# Patient Record
Sex: MALE | Race: ASIAN | NOT HISPANIC OR LATINO | Employment: OTHER | URBAN - METROPOLITAN AREA
[De-identification: names, ages, dates, MRNs, and addresses within clinical notes are randomized per-mention and may not be internally consistent; named-entity substitution may affect disease eponyms.]

---

## 2019-01-02 ENCOUNTER — HOSPITAL ENCOUNTER (OUTPATIENT)
Dept: RADIOLOGY | Facility: HOSPITAL | Age: 79
Discharge: HOME | End: 2019-01-02
Attending: INTERNAL MEDICINE
Payer: MEDICARE

## 2019-01-02 DIAGNOSIS — M54.50 LOW BACK PAIN: ICD-10-CM

## 2019-01-02 DIAGNOSIS — M25.561 RIGHT KNEE PAIN: Primary | ICD-10-CM

## 2019-01-02 DIAGNOSIS — R05.9 COUGH: ICD-10-CM

## 2019-01-02 DIAGNOSIS — M25.561 RIGHT KNEE PAIN: ICD-10-CM

## 2019-01-02 DIAGNOSIS — R60.0 LOWER LEG EDEMA: Primary | ICD-10-CM

## 2019-01-02 DIAGNOSIS — R60.0 LEG EDEMA: Primary | ICD-10-CM

## 2019-01-02 DIAGNOSIS — M25.562 LEFT KNEE PAIN: ICD-10-CM

## 2019-01-02 DIAGNOSIS — R09.89 BRUIT: Primary | ICD-10-CM

## 2019-01-02 DIAGNOSIS — R05.9 COUGH: Primary | ICD-10-CM

## 2019-01-02 DIAGNOSIS — M81.0 OSTEOPOROSIS, SENILE: Primary | ICD-10-CM

## 2019-01-02 PROCEDURE — 71046 X-RAY EXAM CHEST 2 VIEWS: CPT | Mod: TC,FY

## 2019-01-02 PROCEDURE — 73560 XR KNEE 1 OR 2 VIEW RIGHT: ICD-10-PCS | Mod: 26,RT,, | Performed by: RADIOLOGY

## 2019-01-02 PROCEDURE — 73560 X-RAY EXAM OF KNEE 1 OR 2: CPT | Mod: TC,FY,RT

## 2019-01-02 PROCEDURE — 73560 XR KNEE 1 OR 2 VIEW LEFT: ICD-10-PCS | Mod: 26,LT,, | Performed by: RADIOLOGY

## 2019-01-02 PROCEDURE — 73560 X-RAY EXAM OF KNEE 1 OR 2: CPT | Mod: TC,FY,LT

## 2019-01-02 PROCEDURE — 73560 X-RAY EXAM OF KNEE 1 OR 2: CPT | Mod: 26,RT,, | Performed by: RADIOLOGY

## 2019-01-02 PROCEDURE — 71046 XR CHEST PA AND LATERAL: ICD-10-PCS | Mod: 26,,, | Performed by: RADIOLOGY

## 2019-01-02 PROCEDURE — 73560 X-RAY EXAM OF KNEE 1 OR 2: CPT | Mod: 26,LT,, | Performed by: RADIOLOGY

## 2019-01-02 PROCEDURE — 71046 X-RAY EXAM CHEST 2 VIEWS: CPT | Mod: 26,,, | Performed by: RADIOLOGY

## 2019-01-10 ENCOUNTER — HOSPITAL ENCOUNTER (OUTPATIENT)
Dept: RADIOLOGY | Facility: HOSPITAL | Age: 79
Discharge: HOME | End: 2019-01-10
Attending: ORTHOPAEDIC SURGERY
Payer: MEDICARE

## 2019-01-10 ENCOUNTER — CLINICAL SUPPORT (OUTPATIENT)
Dept: LAB | Facility: HOSPITAL | Age: 79
End: 2019-01-10
Attending: ORTHOPAEDIC SURGERY
Payer: MEDICARE

## 2019-01-10 DIAGNOSIS — Z01.818 PREOP EXAMINATION: ICD-10-CM

## 2019-01-10 DIAGNOSIS — Z01.818 PREOP EXAMINATION: Primary | ICD-10-CM

## 2019-01-10 PROCEDURE — 93010 EKG 12-LEAD: ICD-10-PCS | Mod: ,,, | Performed by: INTERNAL MEDICINE

## 2019-01-10 PROCEDURE — 71046 XR CHEST PA AND LATERAL: ICD-10-PCS | Mod: 26,,, | Performed by: RADIOLOGY

## 2019-01-10 PROCEDURE — 71046 X-RAY EXAM CHEST 2 VIEWS: CPT | Mod: 26,,, | Performed by: RADIOLOGY

## 2019-01-10 PROCEDURE — 93005 ELECTROCARDIOGRAM TRACING: CPT

## 2019-01-10 PROCEDURE — 93010 ELECTROCARDIOGRAM REPORT: CPT | Mod: ,,, | Performed by: INTERNAL MEDICINE

## 2019-01-10 PROCEDURE — 71046 X-RAY EXAM CHEST 2 VIEWS: CPT | Mod: TC,FY

## 2019-01-18 DIAGNOSIS — M17.11 PRIMARY OSTEOARTHRITIS OF RIGHT KNEE: Primary | ICD-10-CM

## 2019-01-22 DIAGNOSIS — Z01.818 PREOPERATIVE TESTING: Primary | ICD-10-CM

## 2019-01-23 DIAGNOSIS — R06.02 SHORTNESS OF BREATH: Primary | ICD-10-CM

## 2019-01-24 ENCOUNTER — ANESTHESIA EVENT (OUTPATIENT)
Dept: SURGERY | Facility: HOSPITAL | Age: 79
DRG: 470 | End: 2019-01-24
Payer: MEDICARE

## 2019-01-24 ENCOUNTER — HOSPITAL ENCOUNTER (OUTPATIENT)
Facility: HOSPITAL | Age: 79
Discharge: HOME | End: 2019-01-24
Attending: PAIN MEDICINE | Admitting: PAIN MEDICINE
Payer: MEDICARE

## 2019-01-24 ENCOUNTER — HOSPITAL ENCOUNTER (OUTPATIENT)
Dept: CARDIOLOGY | Facility: HOSPITAL | Age: 79
Discharge: HOME | End: 2019-01-24
Attending: INTERNAL MEDICINE
Payer: MEDICARE

## 2019-01-24 VITALS
DIASTOLIC BLOOD PRESSURE: 93 MMHG | RESPIRATION RATE: 18 BRPM | OXYGEN SATURATION: 98 % | HEIGHT: 68 IN | SYSTOLIC BLOOD PRESSURE: 195 MMHG | HEART RATE: 86 BPM | WEIGHT: 172.38 LBS | TEMPERATURE: 98 F | BODY MASS INDEX: 26.13 KG/M2

## 2019-01-24 DIAGNOSIS — R06.02 SHORTNESS OF BREATH: ICD-10-CM

## 2019-01-24 DIAGNOSIS — M17.11 PRIMARY OSTEOARTHRITIS OF RIGHT KNEE: Primary | ICD-10-CM

## 2019-01-24 LAB
AORTIC ROOT ANNULUS: 2.49 CM
AV INDEX (PROSTH): 0.76
AV MEAN GRADIENT: 2.41 MMHG
AV PEAK GRADIENT: 4.75 MMHG
AV VALVE AREA: 3.39 CM2
AV VELOCITY RATIO: 0.79
CV ECHO LV RWT: 0.36 CM
DOP CALC AO PEAK VEL: 1.09 M/S
DOP CALC AO VTI: 24.22 CM
DOP CALC LVOT AREA: 4.48 CM2
DOP CALC LVOT DIAMETER: 2.39 CM
DOP CALC LVOT PEAK VEL: 0.86 M/S
DOP CALC LVOT STROKE VOLUME: 82.15 CM3
DOP CALCLVOT PEAK VEL VTI: 18.32 CM
E WAVE DECELERATION TIME: 422.08 MSEC
E/A RATIO: 0.48
ECHO LV POSTERIOR WALL: 0.74 CM (ref 0.6–1.1)
FRACTIONAL SHORTENING: 32 % (ref 28–44)
INTERVENTRICULAR SEPTUM: 1 CM (ref 0.6–1.1)
LA MAJOR: 4.43 CM
LA MINOR: 4.28 CM
LA WIDTH: 2.76 CM
LEFT ATRIUM SIZE: 3.49 CM
LEFT ATRIUM VOLUME: 35.65 CM3
LEFT INTERNAL DIMENSION IN SYSTOLE: 2.78 CM (ref 2.1–4)
LEFT VENTRICLE DIASTOLIC VOLUME: 72.42 ML
LEFT VENTRICLE SYSTOLIC VOLUME: 28.95 ML
LEFT VENTRICULAR INTERNAL DIMENSION IN DIASTOLE: 4.06 CM (ref 3.5–6)
LEFT VENTRICULAR MASS: 107.25 G
MV PEAK A VEL: 0.81 M/S
MV PEAK E VEL: 0.39 M/S
PISA TR MAX VEL: 2.23 M/S
PULM VEIN S/D RATIO: 2.12
PV PEAK D VEL: 0.25 M/S
PV PEAK S VEL: 0.53 M/S
PV PEAK VELOCITY: 0.71 CM/S
RA MAJOR: 4.31 CM
RA PRESSURE: 3 MMHG
RIGHT VENTRICULAR END-DIASTOLIC DIMENSION: 2.45 CM
STJ: 3.02 CM
TR MAX PG: 19.89 MMHG
TV REST PULMONARY ARTERY PRESSURE: 23 MMHG

## 2019-01-24 PROCEDURE — 27201689 HC IOVERA SMART TIP/CARTRIDGE: Performed by: NURSE PRACTITIONER

## 2019-01-24 PROCEDURE — 64640 INJECTION TREATMENT OF NERVE: CPT | Mod: RT,,, | Performed by: NURSE PRACTITIONER

## 2019-01-24 PROCEDURE — 64640 INJECTION TREATMENT OF NERVE: CPT | Performed by: NURSE PRACTITIONER

## 2019-01-24 PROCEDURE — 25000003 PHARM REV CODE 250: Performed by: PAIN MEDICINE

## 2019-01-24 PROCEDURE — 64640 PR DESTRUCT BY NEURO AGENT; OTHER PERIPH NERVE: ICD-10-PCS | Mod: RT,,, | Performed by: NURSE PRACTITIONER

## 2019-01-24 PROCEDURE — 93306 TTE W/DOPPLER COMPLETE: CPT

## 2019-01-24 RX ORDER — METFORMIN HYDROCHLORIDE 500 MG/1
500 TABLET ORAL 2 TIMES DAILY WITH MEALS
COMMUNITY
End: 2023-05-04 | Stop reason: CLARIF

## 2019-01-24 RX ORDER — LIDOCAINE HYDROCHLORIDE 10 MG/ML
1 INJECTION, SOLUTION EPIDURAL; INFILTRATION; INTRACAUDAL; PERINEURAL ONCE
Status: CANCELLED | OUTPATIENT
Start: 2019-01-24 | End: 2019-01-24

## 2019-01-24 RX ORDER — LIDOCAINE HYDROCHLORIDE 10 MG/ML
10 INJECTION INFILTRATION; PERINEURAL ONCE
Status: COMPLETED | OUTPATIENT
Start: 2019-01-24 | End: 2019-01-24

## 2019-01-24 RX ORDER — LEVOTHYROXINE SODIUM 25 UG/1
25 TABLET ORAL DAILY
COMMUNITY
End: 2023-05-04

## 2019-01-24 RX ORDER — SODIUM CHLORIDE, SODIUM LACTATE, POTASSIUM CHLORIDE, CALCIUM CHLORIDE 600; 310; 30; 20 MG/100ML; MG/100ML; MG/100ML; MG/100ML
INJECTION, SOLUTION INTRAVENOUS CONTINUOUS
Status: CANCELLED | OUTPATIENT
Start: 2019-01-24

## 2019-01-24 RX ADMIN — LIDOCAINE HYDROCHLORIDE 10 ML: 10 INJECTION, SOLUTION INFILTRATION; PERINEURAL at 09:01

## 2019-01-24 NOTE — ANESTHESIA PREPROCEDURE EVALUATION
01/24/2019  Sly Villarreal is a 79 y.o., male right sigh assisted knee arthroplasty under spinal/MAC/gen on 1/28/19.  Macedonian speaking patient,  present.    Family medicine optimization in Epic.     Anesthesia Evaluation    I have reviewed the Patient Summary Reports.    I have reviewed the Nursing Notes.   I have reviewed the Medications.     Review of Systems  Anesthesia Hx:  No previous Anesthesia  Denies Family Hx of Anesthesia complications.    Social:  Non-Smoker, No Alcohol Use    Hematology/Oncology:  Hematology Normal   Oncology Normal     Cardiovascular:  Cardiovascular Normal   Denies Angina.     ECG has been reviewed.    Pulmonary:  Pulmonary Normal  Denies Shortness of breath.    Renal/:  Renal/ Normal     Hepatic/GI:  Hepatic/GI Normal Denies Liver Disease.    Musculoskeletal:   Arthritis     Neurological:  Neurology Normal Denies Seizures.    Endocrine:   Diabetes, type 2        Physical Exam  General:  Well nourished    Airway/Jaw/Neck:  Airway Findings: Mouth Opening: Normal Tongue: Normal  General Airway Assessment: Adult  Mallampati: II      Dental:  Dental Findings: Periodontal disease, Severe   Chest/Lungs:  Chest/Lungs Findings: Clear to auscultation, Normal Respiratory Rate     Heart/Vascular:  Heart Findings: Rate: Normal  Rhythm: Regular Rhythm  Sounds: Normal  Heart murmur: negative       Mental Status:  Mental Status Findings:  Cooperative, Alert and Oriented       EKG 1/10/19:  Normal sinus rhythm  Possible Left atrial enlargement  abnormal R wave progression in precordial leads  Low T waves  Borderline Abnormal ECG  No previous ECGs available  Confirmed by Efrain Verma MD    Echo 1/24/19:  · Normal left ventricular systolic function. The estimated ejection fraction is 60%  · Grade I (mild) left ventricular diastolic dysfunction consistent with impaired  relaxation.  · Normal right ventricular systolic function.  · Mild aortic regurgitation.  · Mild mitral regurgitation.  · The aortic valve is bileaflet.  · Mild tricuspid regurgitation.  · Normal left atrial pressure.  · Normal central venous pressure (3 mm Hg).  · The estimated PA systolic pressure is 23 mm Hg         Anesthesia Plan  Type of Anesthesia, risks & benefits discussed:  Anesthesia Type:  general, MAC, spinal, regional  Patient's Preference:   Intra-op Monitoring Plan: standard ASA monitors  Intra-op Monitoring Plan Comments:   Post Op Pain Control Plan: multimodal analgesia  Post Op Pain Control Plan Comments:   Induction:   IV  Beta Blocker:  Patient is not currently on a Beta-Blocker (No further documentation required).       Informed Consent: Patient understands risks and agrees with Anesthesia plan.  Questions answered. Anesthesia consent signed with patient.  ASA Score: 2     Day of Surgery Review of History & Physical:  There are no significant changes.      Anesthesia Plan Notes: Anesthesia consent to be signed prior to procedure on 1/28/19  Family medicine optimization in Epic.         Ready For Surgery From Anesthesia Perspective.

## 2019-01-24 NOTE — PLAN OF CARE
Allergies, medical, surgical, family and psychosocial histories reviewed with patient. Periop plan of care discussed. Preop instructions given, including medications to take and to hold. Hibiclens soap and instructions on use given. Time given for questions and pt voiced understanding.      Pt contact number:   [phone] Daughter (Kimberly)

## 2019-01-24 NOTE — PLAN OF CARE
· Arrive on 1/28/2019 at  8:00 am.  · Report to the 2nd floor Procedural Check In Room .   · Nothing to eat or drink after midnight the night before your procedure.  · Take the levothyroxine medications the morning of surgery with a small sip of water.                                                         · Please remove all body piercings and leave all your jewelery and valuables at home .  · Please remove your contact lenses.   · Wear loose comfortable clothing.  · You will not be able to drive that day, please make arrangement for transportation to and from your procedure.  · You cannot be alone for 24 hours after anesthesia. Make arrangements as needed.  · Shower the night before your procedure and the morning of your procedure with Hibiclens antibacterial solution.  · No lotions, creams or powders  · Do not shave 3 days prior to procedure  · Report any signs or symptoms of an infection to your surgeon. Examples: urinary (bladder) infection, upper respiratory infection, skin boils.   · Practice good hand washing before, during, and after procedure.   · Stop Aspirin, Ibuprofen, Advil, Motrin, Aleve, Nuprin, Naprosyn (all NSAID Medication) or any other blood thinners 5 days before your procedure unless directed by your physician.  Also hold all over the counter vitamins and herbal supplements for 5 days prior to your procedure.  · You may take Tylenol or Acetaminophen up the day of surgery for any pain.        I have read or had read and explained to me, and understand the above information.    Additional comments or instructions:  For additional questions call [phone]        Pre-Op Bathing Instructions    Before surgery, you can play an important role in your own health.    Because skin is not sterile, we need to be sure that your skin is as free of germs as possible. By following the instructions below, you can reduce the number of germs on your skin before surgery.    IMPORTANT: You will need to shower with  a special soap called Hibiclens*, available at any pharmacy, over the counter usually in the first aid isle.  If you are allergic to Chlorhexidine (the antiseptic in Hibiclens), use an antibacterial soap such as Dial Soap for your preoperative showers.  You will shower with Hibiclens the night before and the morning of surgery. Both the night before your surgery and the morning of your surgery see steps 2 and 3.   Do not use Hibiclens on the head, face or genitals to avoid injury to those areas.    STEP #1  1.  Shower with Hibiclens solution night before and the morning of surgery.      STEP #2: THE NIGHT BEFORE YOUR SURGERY     1. Do not shave the area of your body where your surgery will be performed.  2. Wash your hair as usual with your normal  Shampoo. Wash body shoulder to toes with your normal soap.  3. Squeeze Hibiclens into hand apply to surgical site.   4. Wash the site gently for five (5) minutes. Do not scrub your skin too hard.   5. Do not wash with your regular soap after Hibiclens is used.  6. Rinse your body thoroughly.  7. Pat yourself dry with a clean, soft towel.  8. Do not use lotion, cream, or powder.  9. Wear clean clothes.    STEP #3: THE MORNING OF YOUR SURGERY     1. Repeat Step #2.    * Not to be used by people allergic to Chlorhexidine.            Total Knee Replacement  During total knee replacement surgery, your damaged knee joint is replaced with an artificial joint, called a prosthesis. This surgery almost always reduces joint pain and improves your quality of life.     The parts of the prosthesis are secured to the bones of the knee. Together they form the new joint.   Before your surgery  You will most likely arrive at the hospital on the morning of the surgery. Be sure to follow all of your doctors instructions on preparing for surgery:  · Follow any directions you are given for taking medicines or for not eating or drinking before surgery.  · At the hospital, your temperature,  pulse, breathing, and blood pressure will be checked.  · An IV (intravenous) line will be started to provide fluids and medicines needed during surgery.  The surgical procedure  When the surgical team is ready, youll be taken to the operating room. There youll be given anesthesia to help you sleep through surgery, or to make you numb from the waist down. Then an incision is made on the front or side of your knee. Any damaged bone is cleaned away, and the new joint components are put into place. The incision is closed with surgical staples or stitches.  After your surgery  After surgery, youtatyana be sent to the recovery room. When you are fully awake, youll be moved to your room. The nurses will give you medicines to ease your pain. You may have a catheter (small tube) in your bladder. A continuous passive motion machine may be used on your knee to keep it from getting stiff. A sequential compression machine may be used to prevent blood clots by gently squeezing then releasing your leg. You may be given medicine to prevent blood clots. Soon, healthcare providers will help you get up and moving.  When to call your doctor  Once at home, call your doctor if you have any of the symptoms below:  · An increase in knee pain  · Pain or swelling in the calf or leg  · Unusual redness, heat, or drainage at the incision site  · Fever of 100.4°F (38°C) or higher  · Shaking chills  · Trouble breathing or chest pain (call 911)   Date Last Reviewed: 9/20/2015 © 2000-2017 [employer]. [address] [place] [address], [place] [address]. All rights reserved. This information is not intended as a substitute for professional medical care. Always follow your healthcare professional's instructions.          Anesthesia: Regional Anesthesia    Youre scheduled for surgery. During surgery, youll receive medicine called anesthesia to keep you comfortable and pain-free. Your surgeon has decided that youll receive regional anesthesia.  This sheet tells you what to expect with this type of anesthesia.  What is regional anesthesia?  Regional anesthesia numbs one region of your body. The anesthesia may be given around nerves or into veins in your arms, neck, or legs (nerve block or [place] block). Or it may be sent into the spinal fluid (spinal anesthesia) or into the space just outside the spinal fluid (epidural anesthesia). You may also be given sedatives to help you relax.  Nerve block or [place] block  A small area of the body, such as an arm or leg, can be numbed using a nerve block or [place] block.  · Nerve block. During a nerve block, your skin is numbed. A needle is then inserted near nerves that serve the area to be numbed. Anesthetic is sent through the needle.  · IV regional or [place] block. For this type of block, an IV line is put into a vein. The blood flow to the area to be numbed is blocked for a short time. Anesthetic is sent through the IV.  Spinal anesthesia  Spinal anesthesia numbs your body from about the waist down.  · Anesthetic is injected into the spinal fluid. This is a substance that surrounds the spinal cord in your spinal column. The anesthetic blocks pain traveling from the body to the brain.  · To receive the anesthetic, your skin is numbed at the injection site on your back.  · A needle is then inserted into the spinal space. Anesthetic is sent into the spinal fluid through the needle.  Epidural anesthesia  Epidural anesthesia is most commonly used during childbirth and may also be used after surgical procedures of the chest, belly, and legs.  · Anesthetic is injected into the epidural space. This is just outside the dural sac which contains the spinal fluid.  · To receive the anesthetic, your skin is numbed at the injection site on your back.  · A needle is then inserted into the epidural space. Anesthetic is sent into the epidural space through the needle.  · A small flexible catheter may be attached to the needle and left in  place. This allows for continuous injections or infusions of anesthetic.  Anesthesia tools and medicines that might be near you during your procedure  · Local anesthetic. This medicine is given through a needle numbs one region of your body.  · Electrocardiography leads (electrodes). These are used to record your heart rate and rhythm.  · Blood pressure cuff. A cuff is placed on your arm to keep track of your blood pressure.  · Pulse oximeter. This small clip is placed on the end of the finger. It measures your blood oxygen level.  · Sedatives. These medicines may be given through an IV. They help to relax you and keep you comfortable. You may stay awake or sleep lightly.  · Oxygen. You may be given oxygen through a facemask.  Risks and possible complications  Regional anesthesia carries some risks. These include:  · Nausea and vomiting  · Headache  · Backache  · Decreased blood pressure  · Allergic reaction to the anesthetic  · Ongoing numbness (rare)  · Irregular heartbeat (rare)  · Cardiac arrest (rare)   Date Last Reviewed: 12/1/2016  © 3247-9385 [employer]. [address] [place] [address]. All rights reserved. This information is not intended as a substitute for professional medical care. Always follow your healthcare professional's instructions.

## 2019-01-24 NOTE — PLAN OF CARE
Received pt for IOVERA treatment. See flow sheets.     NIURKA Rucker FNP at bedside. Consent obtained.     0906 time out completed.  0908 Procedure started.  0935 Procedure complete.    Discharge teaching done with pt and daughter, time allowed for questions. Verbalized understanding. Pt discharged ambulatory to Pre-op.          IOVERA  Smart Tip [id number]  Cartridge Lot #[id number]

## 2019-01-24 NOTE — BRIEF OP NOTE
Procedure Note Iovera:    DATE OF PROCEDURE:  1/24/2019    PREOPERATIVE DIAGNOSIS: right knee osteoarthritis.     POSTOPERATIVE DIAGNOSIS: right knee osteoarthritis.     PROCEDURE: Iovera treatment of anterior femoral cutaneous nerve, and both branches of infrapatellar saphenous nerve using at least 3 different punctures to treat all 3 nerves. (cpt 64640 x3)    ATTENDING SURGEON: EDWARD Mancuso  Interventional Pain Management      ASSISTANT: none    COMPLICATIONS: None.     IMPLANTS:  None    ESTIMATED BLOOD LOSS:  < 5cc    SPECIMENS REMOVED:  None    ANESTHESIA: Local lidocaine    INDICATIONS FOR PROCEDURE: This is a 79 y.o. male with longstanding knee pain. They have failed non operative management including injections.I discussed a new treatment therapy called Iovera, which is cryotherapy, to provide symptomatic relief along the sensory distribution of the infrapatellar tendon branch of the saphenous nerve  and AFCN. The patient elected to move forward with this  We did discuss the fact that this is a fairly novel procedure and there is very limited scientific data around this.  However, it FDA approved.  The patient was given patient information and literature to review prior to the procedure as well.  Based on this, the patient agreed to move forward with doing the procedure.      PROCEDURE:    The patient was placed supine on the exam table and the proximal medial aspect of the right tibia and anterior aspect of distal femur was prepped with sterile Betadine and alcohol.  A line was drawn extending approximately 5 cm medial to inferior pole of the patella distally to a point approximately 5 cm medial to the tibial tubercle.  A second line was drawn in a medial to lateral direction the width of the patella approximately 7 cm proximal to the patella. We then infiltrated the skin with lidocaine along both lines using a 25g needle. We then introduced the Iovera device along these lines and this device penetrated  the skin, creating cryotherapy to both branches of the infrapatellar saphenous nerve and a third treatment to the anterior femoral cutaneous nerve. 7 punctures of the skin were made to treat the 2 branches of the ISN and another 7 punctures were made to treat the AFCN. There were a total of 3 nerves treated with iovera. The patient tolerated the procedure well with no problems.

## 2019-01-24 NOTE — DISCHARGE SUMMARY
[facility]  Discharge Note  Short Stay    Admit Date: 1/24/2019    Discharge Date and Time: No discharge date for patient encounter.     Attending Physician: Otilia Garcia Jr., MD     Discharge Provider: EDWARD Mancuso  Interventional Pain Management      Diagnoses:  Active Hospital Problems    Diagnosis  POA    Primary osteoarthritis of right knee [M17.11]  Yes      Resolved Hospital Problems   No resolved problems to display.       Discharged Condition: good    Hospital Course: Patient was admitted for an outpatient procedure and tolerated the procedure well with no complications.    Final Diagnoses: Same as principal problem.    Disposition: Home or Self Care    Follow up/Patient Instructions:    Medications:  Reconciled Home Medications:      Medication List      ASK your doctor about these medications    HYDROcodone-acetaminophen 5-325 mg per tablet  Commonly known as:  NORCO  Take 1 tablet by mouth every 4 (four) hours as needed for Pain.     meloxicam 15 MG tablet  Commonly known as:  MOBIC  TAKE 1 TABLET BY MOUTH DAILY WITH FOOD  FOR PAIN     tamsulosin 0.4 mg Cap  Commonly known as:  FLOMAX  TAKE 1 CAPSULE BY MOUTH DAILY FOR PROSTATE     VITAMIN D2 50,000 unit Cap  Generic drug:  ergocalciferol  Take 1 capsule (50,000 Units total) by mouth every 7 days.              Discharge Procedure Orders (must include Diet, Follow-up, Activity):  Discharge Diagnosis: Same as Pre and Post Procedure  Condition on Discharge: Stable.  Diet on Discharge: Same as before.  Activity: as per instruction sheet.  Discharge to: Home with a responsible adult.  Follow up: as per Discharge instructions

## 2019-01-24 NOTE — DISCHARGE INSTRUCTIONS
· Arrive on 1/28/2019 at  8:00 am.  · Report to the 2nd floor Procedural Check In Room .   · Nothing to eat or drink after midnight the night before your procedure.  · Take the levothyroxine medications the morning of surgery with a small sip of water.                                                         · Please remove all body piercings and leave all your jewelery and valuables at home .  · Please remove your contact lenses.   · Wear loose comfortable clothing.  · You will not be able to drive that day, please make arrangement for transportation to and from your procedure.  · You cannot be alone for 24 hours after anesthesia. Make arrangements as needed.  · Shower the night before your procedure and the morning of your procedure with Hibiclens antibacterial solution.  · No lotions, creams or powders  · Do not shave 3 days prior to procedure  · Report any signs or symptoms of an infection to your surgeon. Examples: urinary (bladder) infection, upper respiratory infection, skin boils.   · Practice good hand washing before, during, and after procedure.   · Stop Aspirin, Ibuprofen, Advil, Motrin, Aleve, Nuprin, Naprosyn (all NSAID Medication) or any other blood thinners 5 days before your procedure unless directed by your physician.  Also hold all over the counter vitamins and herbal supplements for 5 days prior to your procedure.  · You may take Tylenol or Acetaminophen up the day of surgery for any pain.        I have read or had read and explained to me, and understand the above information.    Additional comments or instructions:  For additional questions call [phone]        Pre-Op Bathing Instructions    Before surgery, you can play an important role in your own health.    Because skin is not sterile, we need to be sure that your skin is as free of germs as possible. By following the instructions below, you can reduce the number of germs on your skin before surgery.    IMPORTANT: You will need to shower with  a special soap called Hibiclens*, available at any pharmacy, over the counter usually in the first aid isle.  If you are allergic to Chlorhexidine (the antiseptic in Hibiclens), use an antibacterial soap such as Dial Soap for your preoperative showers.  You will shower with Hibiclens the night before and the morning of surgery. Both the night before your surgery and the morning of your surgery see steps 2 and 3.   Do not use Hibiclens on the head, face or genitals to avoid injury to those areas.    STEP #1  1.  Shower with Hibiclens solution night before and the morning of surgery.      STEP #2: THE NIGHT BEFORE YOUR SURGERY     1. Do not shave the area of your body where your surgery will be performed.  2. Wash your hair as usual with your normal  Shampoo. Wash body shoulder to toes with your normal soap.  3. Squeeze Hibiclens into hand apply to surgical site.   4. Wash the site gently for five (5) minutes. Do not scrub your skin too hard.   5. Do not wash with your regular soap after Hibiclens is used.  6. Rinse your body thoroughly.  7. Pat yourself dry with a clean, soft towel.  8. Do not use lotion, cream, or powder.  9. Wear clean clothes.    STEP #3: THE MORNING OF YOUR SURGERY     1. Repeat Step #2.    * Not to be used by people allergic to Chlorhexidine.            Total Knee Replacement  During total knee replacement surgery, your damaged knee joint is replaced with an artificial joint, called a prosthesis. This surgery almost always reduces joint pain and improves your quality of life.     The parts of the prosthesis are secured to the bones of the knee. Together they form the new joint.   Before your surgery  You will most likely arrive at the hospital on the morning of the surgery. Be sure to follow all of your doctors instructions on preparing for surgery:  · Follow any directions you are given for taking medicines or for not eating or drinking before surgery.  · At the hospital, your temperature,  pulse, breathing, and blood pressure will be checked.  · An IV (intravenous) line will be started to provide fluids and medicines needed during surgery.  The surgical procedure  When the surgical team is ready, youll be taken to the operating room. There youll be given anesthesia to help you sleep through surgery, or to make you numb from the waist down. Then an incision is made on the front or side of your knee. Any damaged bone is cleaned away, and the new joint components are put into place. The incision is closed with surgical staples or stitches.  After your surgery  After surgery, youtatyana be sent to the recovery room. When you are fully awake, youll be moved to your room. The nurses will give you medicines to ease your pain. You may have a catheter (small tube) in your bladder. A continuous passive motion machine may be used on your knee to keep it from getting stiff. A sequential compression machine may be used to prevent blood clots by gently squeezing then releasing your leg. You may be given medicine to prevent blood clots. Soon, healthcare providers will help you get up and moving.  When to call your doctor  Once at home, call your doctor if you have any of the symptoms below:  · An increase in knee pain  · Pain or swelling in the calf or leg  · Unusual redness, heat, or drainage at the incision site  · Fever of 100.4°F (38°C) or higher  · Shaking chills  · Trouble breathing or chest pain (call 911)   Date Last Reviewed: 9/20/2015 © 2000-2017 [email or web address]. [address] [place] [address], [place] [address]. All rights reserved. This information is not intended as a substitute for professional medical care. Always follow your healthcare professional's instructions.          Anesthesia: Regional Anesthesia    Youre scheduled for surgery. During surgery, youll receive medicine called anesthesia to keep you comfortable and pain-free. Your surgeon has decided that youll receive regional anesthesia.  This sheet tells you what to expect with this type of anesthesia.  What is regional anesthesia?  Regional anesthesia numbs one region of your body. The anesthesia may be given around nerves or into veins in your arms, neck, or legs (nerve block or [place] block). Or it may be sent into the spinal fluid (spinal anesthesia) or into the space just outside the spinal fluid (epidural anesthesia). You may also be given sedatives to help you relax.  Nerve block or [place] block  A small area of the body, such as an arm or leg, can be numbed using a nerve block or Emily block.  · Nerve block. During a nerve block, your skin is numbed. A needle is then inserted near nerves that serve the area to be numbed. Anesthetic is sent through the needle.  · IV regional or [place] block. For this type of block, an IV line is put into a vein. The blood flow to the area to be numbed is blocked for a short time. Anesthetic is sent through the IV.  Spinal anesthesia  Spinal anesthesia numbs your body from about the waist down.  · Anesthetic is injected into the spinal fluid. This is a substance that surrounds the spinal cord in your spinal column. The anesthetic blocks pain traveling from the body to the brain.  · To receive the anesthetic, your skin is numbed at the injection site on your back.  · A needle is then inserted into the spinal space. Anesthetic is sent into the spinal fluid through the needle.  Epidural anesthesia  Epidural anesthesia is most commonly used during childbirth and may also be used after surgical procedures of the chest, belly, and legs.  · Anesthetic is injected into the epidural space. This is just outside the dural sac which contains the spinal fluid.  · To receive the anesthetic, your skin is numbed at the injection site on your back.  · A needle is then inserted into the epidural space. Anesthetic is sent into the epidural space through the needle.  · A small flexible catheter may be attached to the needle and left in  place. This allows for continuous injections or infusions of anesthetic.  Anesthesia tools and medicines that might be near you during your procedure  · Local anesthetic. This medicine is given through a needle numbs one region of your body.  · Electrocardiography leads (electrodes). These are used to record your heart rate and rhythm.  · Blood pressure cuff. A cuff is placed on your arm to keep track of your blood pressure.  · Pulse oximeter. This small clip is placed on the end of the finger. It measures your blood oxygen level.  · Sedatives. These medicines may be given through an IV. They help to relax you and keep you comfortable. You may stay awake or sleep lightly.  · Oxygen. You may be given oxygen through a facemask.  Risks and possible complications  Regional anesthesia carries some risks. These include:  · Nausea and vomiting  · Headache  · Backache  · Decreased blood pressure  · Allergic reaction to the anesthetic  · Ongoing numbness (rare)  · Irregular heartbeat (rare)  · Cardiac arrest (rare)   Date Last Reviewed: 12/1/2016  © 8754-7410 [employer]. [address] [place] [address]. All rights reserved. This information is not intended as a substitute for professional medical care. Always follow your healthcare professional's instructions.

## 2019-01-25 ENCOUNTER — OFFICE VISIT (OUTPATIENT)
Dept: FAMILY MEDICINE | Facility: HOSPITAL | Age: 79
DRG: 470 | End: 2019-01-25
Attending: ORTHOPAEDIC SURGERY
Payer: MEDICARE

## 2019-01-25 VITALS
SYSTOLIC BLOOD PRESSURE: 128 MMHG | DIASTOLIC BLOOD PRESSURE: 72 MMHG | BODY MASS INDEX: 26.43 KG/M2 | HEART RATE: 116 BPM | WEIGHT: 174.38 LBS | HEIGHT: 68 IN

## 2019-01-25 DIAGNOSIS — Z01.818 PRE-OP EVALUATION: Primary | ICD-10-CM

## 2019-01-25 PROCEDURE — 99213 OFFICE O/P EST LOW 20 MIN: CPT | Performed by: STUDENT IN AN ORGANIZED HEALTH CARE EDUCATION/TRAINING PROGRAM

## 2019-01-25 NOTE — PROGRESS NOTES
Pre Operative Note    Chief Complaint: Preop Evaluation, Medical Optimization    Date for Evaluation: 1/25/2019    Planned Procedure: 01/28/2019    Date of Procedure: 01/28/2019    Surgeon: Dr. Bro    HPI: I had seen this pleasant 79 y.o. male in the pre-op clinic today prior to his elective right knee replacement He was accompanied by his daughter. The patient has a history of OA in the right knee.    Active Cardiac Conditions: His history is not suggestive of unstable coronary syndrome, decompensated heart failure, significant arrhythmias, severe valvular disease.    Exercise Tolerance: He can undertake activities such as vacuuming, shopping, golfing, running and mowing without chest pain or shortness of breath making his exercise tolerance more than 4 METs.     Clinical Cardiac Risk Factors: He does not have a history of Cerebral Vascular Disease, Diabetes Mellitus and Renal Impairment He does have a history of Diabetes, last hemoglobin A1c 6.5 (01/10/2019)    Current Anticoagulants and Antiplatelet Therapy: No    ROS    Constitutional: Appetite and weight stable.  HEENT: No double, blurring of vision.   Cardiac: As stated above.  Respiratory: Negative for cough, phlegm, shortness of breath.  GI: Negative for nausea, vomiting, diarrhea.  Gu: No Dysuria, no Hematuria.  MS: Muscle ache: Back pain, joint pain/swelling.  Neuro: No numbness, tingling or weakness.  Endocrine: Not a Diabetic.  Derm: No rashes.  Heme/Onc:  No easy bruising. No bleeding complications with previous surgeries.     Past Medical History:   Diagnosis Date    Hypothyroid 2019    Type 2 diabetes mellitus 2019        Past Surgical History:   Procedure Laterality Date    CRYOTHERAPY, NERVE, PERIPHERAL, PERCUTANEOUS, USING LIQUID NITROUS OXIDE IN CLOSED NEEDLE DEVICE Right 1/24/2019    Performed by MONTANA Sutherland at [facility] OR       SOCIAL HISTORY  Social History     Tobacco Use    Smoking status: Never Smoker   Substance Use Topics     Alcohol use: No    Drug use: Not on file       No family history on file.         MEDICATION HISTORY  Current Outpatient Medications   Medication Sig    ergocalciferol (ERGOCALCIFEROL) 50,000 unit Cap Take 1 capsule (50,000 Units total) by mouth every 7 days.    hydrocodone-acetaminophen 5-325mg (NORCO) 5-325 mg per tablet Take 1 tablet by mouth every 4 (four) hours as needed for Pain.    levothyroxine (SYNTHROID) 25 MCG tablet Take 25 mcg by mouth once daily.    meloxicam (MOBIC) 15 MG tablet TAKE 1 TABLET BY MOUTH DAILY WITH FOOD  FOR PAIN    metFORMIN (GLUCOPHAGE) 500 MG tablet Take 500 mg by mouth 2 (two) times daily with meals.     No current facility-administered medications for this visit.        ALLERGIES   Review of patient's allergies indicates:  No Known Allergies     VITALS  Vitals:    01/25/19 1043   BP: 128/72   Pulse: (!) 116         PHYSICAL EXAMINATION    General:  Well developed, well nourished, no acute distress   Eye: Pupils equal, reactive to light, no conjunctival icterus.   Neck: No thyromegaly, carotid bruit, JVD  Heart: Normal S1 S2, no murmurs, or gallops  Respiratory: normal respiratory effort, bilateral equal air entry.   Abdomen: Soft, non-tender, liver and spleen not palpable  Lymphatic: No cervical, axillary, inguinal, lymphadenopathy   Musculoskeletal: no clubbing, DROM of right knee   Neurological: Conscious, coherent, equal strength, bilaterally on both upper and lower extremities    LAB STUDIES    Blood Tests    CBC:  Lab Results   Component Value Date    WBC 7.89 01/10/2019    HGB 14.5 01/10/2019    HCT 44.0 01/10/2019    MCV 85 01/10/2019     01/10/2019       BMP  Lab Results   Component Value Date     01/10/2019    K 3.7 01/10/2019     01/10/2019    CO2 23 01/10/2019    BUN 19 01/10/2019    CREATININE 1.1 01/10/2019    CALCIUM 9.4 01/10/2019    ANIONGAP 10 01/10/2019    ESTGFRAFRICA >60 01/10/2019    EGFRNONAA >60 01/10/2019         Coags:  Lab Results    Component Value Date    INR 1.0 01/10/2019       EKG: reviewed by ALBERTINA heck. No comparable EKG    ECHO: Transthoracic echo (TTE) complete (Cupid Only)  · Normal left ventricular systolic function. The estimated ejection   fraction is 60%  · Grade I (mild) left ventricular diastolic dysfunction consistent with   impaired relaxation.  · Normal right ventricular systolic function.  · Mild aortic regurgitation.  · Mild mitral regurgitation.  · The aortic valve is bileaflet.  · Mild tricuspid regurgitation.  · Normal left atrial pressure.  · Normal central venous pressure (3 mm Hg).  · The estimated PA systolic pressure is 23 mm Hg         ASSESSMENT AND PLAN    Sly Villarreal was seen in the pre-operative clinic today.    Pre-operative cardiac risk assessment:     He does not have any active cardiac conditions, clinical cardiac risk factors and his exercise tolerance is more than 4 METS.   He will be undergoing a Right knee replacement procedure which carries an intermediate cardiac risk and he is at intermediate risk for the procedure.      Pricila-operative medical management:     Continue to take medications as indicated. Continue healthy eating options.       D'Amico C Johnson, MD  1/25/2019  [facility] HO-1

## 2019-01-25 NOTE — H&P (VIEW-ONLY)
Pre Operative Note    Chief Complaint: Preop Evaluation, Medical Optimization    Date for Evaluation: 1/25/2019    Planned Procedure: 01/28/2019    Date of Procedure: 01/28/2019    Surgeon: Dr. Bro    HPI: I had seen this pleasant 79 y.o. male in the pre-op clinic today prior to his elective right knee replacement He was accompanied by his daughter. The patient has a history of OA in the right knee.    Active Cardiac Conditions: His history is not suggestive of unstable coronary syndrome, decompensated heart failure, significant arrhythmias, severe valvular disease.    Exercise Tolerance: He can undertake activities such as vacuuming, shopping, golfing, running and mowing without chest pain or shortness of breath making his exercise tolerance more than 4 METs.     Clinical Cardiac Risk Factors: He does not have a history of Cerebral Vascular Disease, Diabetes Mellitus and Renal Impairment He does have a history of Diabetes, last hemoglobin A1c 6.5 (01/10/2019)    Current Anticoagulants and Antiplatelet Therapy: No    ROS    Constitutional: Appetite and weight stable.  HEENT: No double, blurring of vision.   Cardiac: As stated above.  Respiratory: Negative for cough, phlegm, shortness of breath.  GI: Negative for nausea, vomiting, diarrhea.  Gu: No Dysuria, no Hematuria.  MS: Muscle ache: Back pain, joint pain/swelling.  Neuro: No numbness, tingling or weakness.  Endocrine: Not a Diabetic.  Derm: No rashes.  Heme/Onc:  No easy bruising. No bleeding complications with previous surgeries.     Past Medical History:   Diagnosis Date    Hypothyroid 2019    Type 2 diabetes mellitus 2019        Past Surgical History:   Procedure Laterality Date    CRYOTHERAPY, NERVE, PERIPHERAL, PERCUTANEOUS, USING LIQUID NITROUS OXIDE IN CLOSED NEEDLE DEVICE Right 1/24/2019    Performed by MONTANA Sutherland at [facility] OR       SOCIAL HISTORY  Social History     Tobacco Use    Smoking status: Never Smoker   Substance Use Topics     Alcohol use: No    Drug use: Not on file       No family history on file.         MEDICATION HISTORY  Current Outpatient Medications   Medication Sig    ergocalciferol (ERGOCALCIFEROL) 50,000 unit Cap Take 1 capsule (50,000 Units total) by mouth every 7 days.    hydrocodone-acetaminophen 5-325mg (NORCO) 5-325 mg per tablet Take 1 tablet by mouth every 4 (four) hours as needed for Pain.    levothyroxine (SYNTHROID) 25 MCG tablet Take 25 mcg by mouth once daily.    meloxicam (MOBIC) 15 MG tablet TAKE 1 TABLET BY MOUTH DAILY WITH FOOD  FOR PAIN    metFORMIN (GLUCOPHAGE) 500 MG tablet Take 500 mg by mouth 2 (two) times daily with meals.     No current facility-administered medications for this visit.        ALLERGIES   Review of patient's allergies indicates:  No Known Allergies     VITALS  Vitals:    01/25/19 1043   BP: 128/72   Pulse: (!) 116         PHYSICAL EXAMINATION    General:  Well developed, well nourished, no acute distress   Eye: Pupils equal, reactive to light, no conjunctival icterus.   Neck: No thyromegaly, carotid bruit, JVD  Heart: Normal S1 S2, no murmurs, or gallops  Respiratory: normal respiratory effort, bilateral equal air entry.   Abdomen: Soft, non-tender, liver and spleen not palpable  Lymphatic: No cervical, axillary, inguinal, lymphadenopathy   Musculoskeletal: no clubbing, DROM of right knee   Neurological: Conscious, coherent, equal strength, bilaterally on both upper and lower extremities    LAB STUDIES    Blood Tests    CBC:  Lab Results   Component Value Date    WBC 7.89 01/10/2019    HGB 14.5 01/10/2019    HCT 44.0 01/10/2019    MCV 85 01/10/2019     01/10/2019       BMP  Lab Results   Component Value Date     01/10/2019    K 3.7 01/10/2019     01/10/2019    CO2 23 01/10/2019    BUN 19 01/10/2019    CREATININE 1.1 01/10/2019    CALCIUM 9.4 01/10/2019    ANIONGAP 10 01/10/2019    ESTGFRAFRICA >60 01/10/2019    EGFRNONAA >60 01/10/2019         Coags:  Lab Results    Component Value Date    INR 1.0 01/10/2019       EKG: reviewed by ALBERTINA heck. No comparable EKG    ECHO: Transthoracic echo (TTE) complete (Cupid Only)  · Normal left ventricular systolic function. The estimated ejection   fraction is 60%  · Grade I (mild) left ventricular diastolic dysfunction consistent with   impaired relaxation.  · Normal right ventricular systolic function.  · Mild aortic regurgitation.  · Mild mitral regurgitation.  · The aortic valve is bileaflet.  · Mild tricuspid regurgitation.  · Normal left atrial pressure.  · Normal central venous pressure (3 mm Hg).  · The estimated PA systolic pressure is 23 mm Hg         ASSESSMENT AND PLAN    Sly Villarreal was seen in the pre-operative clinic today.    Pre-operative cardiac risk assessment:     He does not have any active cardiac conditions, clinical cardiac risk factors and his exercise tolerance is more than 4 METS.   He will be undergoing a Right knee replacement procedure which carries an intermediate cardiac risk and he is at intermediate risk for the procedure.      Pricila-operative medical management:     Continue to take medications as indicated. Continue healthy eating options.       D'Amico C Johnson, MD  1/25/2019  [facility] HO-1

## 2019-01-25 NOTE — PATIENT INSTRUCTIONS
Presurgery Checklist  You are scheduled to have surgery. The healthcare staff will try to make your stay comfortable. Use the guidelines below to remind yourself what to do before surgery. Be sure to follow any specific pre-op instructions from your surgeon or nurse.  Preparing for surgery  · If you are having abdominal surgery, ask what you need to do to clear your bowel.  · Tell your surgeon if you have allergies to any medicines, latex, or foods.  · Ask your surgeon if you might need a blood transfusion during surgery and if so, how to prepare for it. In some cases, you can donate blood before surgery. If needed, this blood can be given back (transfused) to you during or after surgery.  · Arrange for an adult family member or friend to drive you home after surgery. If possible, have someone ready to help you at home as you recover.  · Call the surgeon if you get a cold, fever, sore throat, diarrhea, or other health problem just before surgery. Your surgeon can decide whether or not to postpone the surgery.  Medicines  · Tell your surgeon about all medicines you take, including prescription and over-the-counter products such as herbal remedies and vitamins. Ask if you should continue taking them.  · If you take ibuprofen, naproxen, or blood thinners (anticoagulants) such as aspirin, clopidogrel, or warfarin, ask your surgeon whether you should stop taking them and how long before surgery you should stop.  · You may be told to take antibiotics just before surgery to prevent infection. If so, follow instructions carefully on how to take them.  · If you are told to take blood thinners to help prevent blood clots after surgery, be sure to follow the instructions on how to take them.  Stop smoking  If you smoke, healing may take longer. So at least 2 week(s) before surgery, stop smoking.  Bathing or showering before surgery  · If instructed, wash with antibacterial soap. Afterward, do not use lotions, oils, or  powders.  · If you are having surgery on the head, you may be asked to shampoo with antibacterial soap. Follow instructions for doing so.  Do not remove hair from the surgery site  Do not shave hair from the incision site, unless you are given specific instructions to do so. Usually, if hair needs to be removed, it will be done at the hospital right before surgery.  Dont eat or drink  · Follow any directions you are given for not eating or drinking before surgery. If you don't follow instructions about when to stop eating and drinking, your procedure may be postponed or rescheduled for another day. This is a safety issue.  · You can brush your teeth and rinse your mouth, but dont swallow any water.  Day of surgery  · Don't wear makeup. Don't use perfume, deodorant, or hairspray. Remove nail polish and artificial nails.  · Leave jewelry (including rings), watches, and other valuables at home.  · Be sure to bring health insurance cards or forms and a photo ID.  · Bring a list of your medicines (include the name, dose, how often you take them, and the time last dose was taken).  · Arrive on time at the hospital or surgery facility.  Date Last Reviewed: 12/1/2016  © 9457-7575 [employer]. [address] [place] [address], [place] [address]. All rights reserved. This information is not intended as a substitute for professional medical care. Always follow your healthcare professional's instructions.

## 2019-01-28 ENCOUNTER — ANESTHESIA (OUTPATIENT)
Dept: SURGERY | Facility: HOSPITAL | Age: 79
DRG: 470 | End: 2019-01-28
Payer: MEDICARE

## 2019-01-28 ENCOUNTER — HOSPITAL ENCOUNTER (INPATIENT)
Facility: HOSPITAL | Age: 79
LOS: 1 days | Discharge: HOME | DRG: 470 | End: 2019-01-28
Attending: ORTHOPAEDIC SURGERY | Admitting: ORTHOPAEDIC SURGERY
Payer: MEDICARE

## 2019-01-28 VITALS
HEIGHT: 68 IN | BODY MASS INDEX: 26.37 KG/M2 | DIASTOLIC BLOOD PRESSURE: 89 MMHG | OXYGEN SATURATION: 100 % | SYSTOLIC BLOOD PRESSURE: 160 MMHG | RESPIRATION RATE: 17 BRPM | TEMPERATURE: 98 F | WEIGHT: 174 LBS | HEART RATE: 88 BPM

## 2019-01-28 DIAGNOSIS — M17.9 OSTEOARTHRITIS OF KNEE, UNSPECIFIED LATERALITY, UNSPECIFIED OSTEOARTHRITIS TYPE: ICD-10-CM

## 2019-01-28 DIAGNOSIS — M17.11 PRIMARY OSTEOARTHRITIS OF RIGHT KNEE: Primary | ICD-10-CM

## 2019-01-28 LAB
APPEARANCE FLD: NORMAL
BODY FLD TYPE: NORMAL
COLOR FLD: NORMAL
EOSINOPHIL NFR FLD MANUAL: 1 %
HCT VFR BLD AUTO: 37.8 %
HGB BLD-MCNC: 12.1 G/DL
LYMPHOCYTES NFR FLD MANUAL: 32 %
MESOTHL CELL NFR FLD MANUAL: 2 %
MONOS+MACROS NFR FLD MANUAL: 7 %
NEUTROPHILS NFR FLD MANUAL: 58 %
POCT GLUCOSE: 111 MG/DL (ref 70–110)
WBC # FLD: 525 /CU MM

## 2019-01-28 PROCEDURE — 71000015 HC POSTOP RECOV 1ST HR: Performed by: ORTHOPAEDIC SURGERY

## 2019-01-28 PROCEDURE — 25000003 PHARM REV CODE 250: Performed by: ORTHOPAEDIC SURGERY

## 2019-01-28 PROCEDURE — 63600175 PHARM REV CODE 636 W HCPCS: Performed by: STUDENT IN AN ORGANIZED HEALTH CARE EDUCATION/TRAINING PROGRAM

## 2019-01-28 PROCEDURE — C1713 ANCHOR/SCREW BN/BN,TIS/BN: HCPCS | Performed by: ORTHOPAEDIC SURGERY

## 2019-01-28 PROCEDURE — 97161 PT EVAL LOW COMPLEX 20 MIN: CPT

## 2019-01-28 PROCEDURE — 71000033 HC RECOVERY, INTIAL HOUR: Performed by: ORTHOPAEDIC SURGERY

## 2019-01-28 PROCEDURE — S0020 INJECTION, BUPIVICAINE HYDRO: HCPCS | Performed by: ORTHOPAEDIC SURGERY

## 2019-01-28 PROCEDURE — 25000003 PHARM REV CODE 250: Performed by: STUDENT IN AN ORGANIZED HEALTH CARE EDUCATION/TRAINING PROGRAM

## 2019-01-28 PROCEDURE — 71000039 HC RECOVERY, EACH ADD'L HOUR: Performed by: ORTHOPAEDIC SURGERY

## 2019-01-28 PROCEDURE — 64447 NJX AA&/STRD FEMORAL NRV IMG: CPT | Performed by: ANESTHESIOLOGY

## 2019-01-28 PROCEDURE — 37000009 HC ANESTHESIA EA ADD 15 MINS: Performed by: ORTHOPAEDIC SURGERY

## 2019-01-28 PROCEDURE — 89051 BODY FLUID CELL COUNT: CPT

## 2019-01-28 PROCEDURE — 85014 HEMATOCRIT: CPT

## 2019-01-28 PROCEDURE — 36000710: Performed by: ORTHOPAEDIC SURGERY

## 2019-01-28 PROCEDURE — 63600175 PHARM REV CODE 636 W HCPCS: Performed by: ORTHOPAEDIC SURGERY

## 2019-01-28 PROCEDURE — 27201423 OPTIME MED/SURG SUP & DEVICES STERILE SUPPLY: Performed by: ORTHOPAEDIC SURGERY

## 2019-01-28 PROCEDURE — 12000002 HC ACUTE/MED SURGE SEMI-PRIVATE ROOM

## 2019-01-28 PROCEDURE — 63600175 PHARM REV CODE 636 W HCPCS: Performed by: ANESTHESIOLOGY

## 2019-01-28 PROCEDURE — 97165 OT EVAL LOW COMPLEX 30 MIN: CPT

## 2019-01-28 PROCEDURE — 36000711: Performed by: ORTHOPAEDIC SURGERY

## 2019-01-28 PROCEDURE — 85018 HEMOGLOBIN: CPT

## 2019-01-28 PROCEDURE — 97535 SELF CARE MNGMENT TRAINING: CPT

## 2019-01-28 PROCEDURE — 25000003 PHARM REV CODE 250: Performed by: NURSE PRACTITIONER

## 2019-01-28 PROCEDURE — 37000008 HC ANESTHESIA 1ST 15 MINUTES: Performed by: ORTHOPAEDIC SURGERY

## 2019-01-28 PROCEDURE — 36415 COLL VENOUS BLD VENIPUNCTURE: CPT

## 2019-01-28 PROCEDURE — C1776 JOINT DEVICE (IMPLANTABLE): HCPCS | Performed by: ORTHOPAEDIC SURGERY

## 2019-01-28 PROCEDURE — 71000016 HC POSTOP RECOV ADDL HR: Performed by: ORTHOPAEDIC SURGERY

## 2019-01-28 DEVICE — FEMORAL NEXGEN LPS OPT SZ G-RT: Type: IMPLANTABLE DEVICE | Site: KNEE | Status: FUNCTIONAL

## 2019-01-28 DEVICE — IMPLANTABLE DEVICE: Type: IMPLANTABLE DEVICE | Site: KNEE | Status: FUNCTIONAL

## 2019-01-28 DEVICE — CEMENT BONE LV G PALACOS 1X40: Type: IMPLANTABLE DEVICE | Site: KNEE | Status: FUNCTIONAL

## 2019-01-28 DEVICE — PATELLA NEXGEN ALL-POLY: Type: IMPLANTABLE DEVICE | Site: KNEE | Status: FUNCTIONAL

## 2019-01-28 RX ORDER — TRANEXAMIC ACID 100 MG/ML
1000 INJECTION, SOLUTION INTRAVENOUS ONCE
Status: COMPLETED | OUTPATIENT
Start: 2019-01-28 | End: 2019-01-28

## 2019-01-28 RX ORDER — ACETAMINOPHEN 325 MG/1
650 TABLET ORAL EVERY 6 HOURS
Status: DISCONTINUED | OUTPATIENT
Start: 2019-01-28 | End: 2019-01-28 | Stop reason: HOSPADM

## 2019-01-28 RX ORDER — PROPOFOL 10 MG/ML
INJECTION, EMULSION INTRAVENOUS CONTINUOUS PRN
Status: DISCONTINUED | OUTPATIENT
Start: 2019-01-28 | End: 2019-01-28

## 2019-01-28 RX ORDER — ACETAMINOPHEN 500 MG
1000 TABLET ORAL
Status: COMPLETED | OUTPATIENT
Start: 2019-01-28 | End: 2019-01-28

## 2019-01-28 RX ORDER — PHENYLEPHRINE HYDROCHLORIDE 10 MG/ML
INJECTION INTRAVENOUS
Status: DISCONTINUED | OUTPATIENT
Start: 2019-01-28 | End: 2019-01-28

## 2019-01-28 RX ORDER — FAMOTIDINE 20 MG/1
20 TABLET, FILM COATED ORAL 2 TIMES DAILY
Qty: 90 TABLET | Refills: 0 | Status: SHIPPED | OUTPATIENT
Start: 2019-01-28 | End: 2019-03-14

## 2019-01-28 RX ORDER — BUPIVACAINE HYDROCHLORIDE 2.5 MG/ML
INJECTION, SOLUTION INFILTRATION; PERINEURAL
Status: DISCONTINUED | OUTPATIENT
Start: 2019-01-28 | End: 2019-01-28 | Stop reason: HOSPADM

## 2019-01-28 RX ORDER — TRANEXAMIC ACID 650 MG/1
1950 TABLET ORAL ONCE
Status: DISPENSED | OUTPATIENT
Start: 2019-01-28

## 2019-01-28 RX ORDER — PROPOFOL 10 MG/ML
INJECTION, EMULSION INTRAVENOUS
Status: DISCONTINUED | OUTPATIENT
Start: 2019-01-28 | End: 2019-01-28

## 2019-01-28 RX ORDER — SODIUM CHLORIDE, SODIUM LACTATE, POTASSIUM CHLORIDE, CALCIUM CHLORIDE 600; 310; 30; 20 MG/100ML; MG/100ML; MG/100ML; MG/100ML
INJECTION, SOLUTION INTRAVENOUS CONTINUOUS
Status: DISCONTINUED | OUTPATIENT
Start: 2019-01-28 | End: 2019-01-28 | Stop reason: HOSPADM

## 2019-01-28 RX ORDER — SODIUM CHLORIDE 0.9 % (FLUSH) 0.9 %
3 SYRINGE (ML) INJECTION
Status: DISCONTINUED | OUTPATIENT
Start: 2019-01-28 | End: 2019-01-28 | Stop reason: HOSPADM

## 2019-01-28 RX ORDER — HYDROMORPHONE HYDROCHLORIDE 2 MG/ML
0.5 INJECTION, SOLUTION INTRAMUSCULAR; INTRAVENOUS; SUBCUTANEOUS EVERY 5 MIN PRN
Status: ACTIVE | OUTPATIENT
Start: 2019-01-28 | End: 2019-01-28

## 2019-01-28 RX ORDER — CEFAZOLIN SODIUM 2 G/50ML
2 SOLUTION INTRAVENOUS
Status: DISCONTINUED | OUTPATIENT
Start: 2019-01-28 | End: 2019-01-28 | Stop reason: HOSPADM

## 2019-01-28 RX ORDER — SODIUM CHLORIDE, SODIUM LACTATE, POTASSIUM CHLORIDE, CALCIUM CHLORIDE 600; 310; 30; 20 MG/100ML; MG/100ML; MG/100ML; MG/100ML
INJECTION, SOLUTION INTRAVENOUS CONTINUOUS PRN
Status: DISCONTINUED | OUTPATIENT
Start: 2019-01-28 | End: 2019-01-28

## 2019-01-28 RX ORDER — OXYCODONE AND ACETAMINOPHEN 5; 325 MG/1; MG/1
1 TABLET ORAL
Qty: 42 TABLET | Refills: 0 | Status: SHIPPED | OUTPATIENT
Start: 2019-01-28 | End: 2019-02-04

## 2019-01-28 RX ORDER — BISACODYL 10 MG
10 SUPPOSITORY, RECTAL RECTAL 2 TIMES DAILY PRN
Status: DISCONTINUED | OUTPATIENT
Start: 2019-01-28 | End: 2019-01-28 | Stop reason: HOSPADM

## 2019-01-28 RX ORDER — TRANEXAMIC ACID 100 MG/ML
1000 INJECTION, SOLUTION INTRAVENOUS
Status: DISCONTINUED | OUTPATIENT
Start: 2019-01-28 | End: 2019-01-28 | Stop reason: HOSPADM

## 2019-01-28 RX ORDER — LIDOCAINE HCL/PF 100 MG/5ML
SYRINGE (ML) INTRAVENOUS
Status: DISCONTINUED | OUTPATIENT
Start: 2019-01-28 | End: 2019-01-28

## 2019-01-28 RX ORDER — CEPHALEXIN 500 MG/1
500 CAPSULE ORAL EVERY 6 HOURS
Qty: 4 CAPSULE | Refills: 0 | Status: SHIPPED | OUTPATIENT
Start: 2019-01-28 | End: 2019-01-29

## 2019-01-28 RX ORDER — OXYCODONE AND ACETAMINOPHEN 5; 325 MG/1; MG/1
1 TABLET ORAL
Status: DISCONTINUED | OUTPATIENT
Start: 2019-01-28 | End: 2019-01-28 | Stop reason: HOSPADM

## 2019-01-28 RX ORDER — ASPIRIN 81 MG/1
81 TABLET ORAL
Qty: 90 TABLET | Refills: 0 | Status: SHIPPED | OUTPATIENT
Start: 2019-01-28 | End: 2019-07-17 | Stop reason: CLARIF

## 2019-01-28 RX ORDER — CELECOXIB 100 MG/1
400 CAPSULE ORAL
Status: COMPLETED | OUTPATIENT
Start: 2019-01-28 | End: 2019-01-28

## 2019-01-28 RX ORDER — BUPIVACAINE HYDROCHLORIDE 7.5 MG/ML
INJECTION, SOLUTION EPIDURAL; RETROBULBAR
Status: COMPLETED | OUTPATIENT
Start: 2019-01-28 | End: 2019-01-28

## 2019-01-28 RX ORDER — CEFAZOLIN SODIUM 2 G/50ML
2 SOLUTION INTRAVENOUS ONCE
Status: COMPLETED | OUTPATIENT
Start: 2019-01-28 | End: 2019-01-28

## 2019-01-28 RX ORDER — TRANEXAMIC ACID 650 MG/1
1950 TABLET ORAL
Status: COMPLETED | OUTPATIENT
Start: 2019-01-28 | End: 2019-01-28

## 2019-01-28 RX ORDER — HYDROCODONE BITARTRATE AND ACETAMINOPHEN 10; 325 MG/1; MG/1
1 TABLET ORAL EVERY 4 HOURS PRN
Status: DISCONTINUED | OUTPATIENT
Start: 2019-01-28 | End: 2019-01-28 | Stop reason: HOSPADM

## 2019-01-28 RX ORDER — LIDOCAINE HYDROCHLORIDE 10 MG/ML
1 INJECTION, SOLUTION EPIDURAL; INFILTRATION; INTRACAUDAL; PERINEURAL ONCE
Status: DISCONTINUED | OUTPATIENT
Start: 2019-01-28 | End: 2019-01-28 | Stop reason: HOSPADM

## 2019-01-28 RX ORDER — ROPIVACAINE HYDROCHLORIDE 5 MG/ML
INJECTION, SOLUTION EPIDURAL; INFILTRATION; PERINEURAL
Status: DISCONTINUED | OUTPATIENT
Start: 2019-01-28 | End: 2019-01-28

## 2019-01-28 RX ORDER — PREGABALIN 75 MG/1
150 CAPSULE ORAL
Status: COMPLETED | OUTPATIENT
Start: 2019-01-28 | End: 2019-01-28

## 2019-01-28 RX ORDER — PREGABALIN 75 MG/1
75 CAPSULE ORAL 2 TIMES DAILY
Status: DISCONTINUED | OUTPATIENT
Start: 2019-01-28 | End: 2019-01-28 | Stop reason: HOSPADM

## 2019-01-28 RX ORDER — ONDANSETRON 2 MG/ML
4 INJECTION INTRAMUSCULAR; INTRAVENOUS EVERY 12 HOURS PRN
Status: DISCONTINUED | OUTPATIENT
Start: 2019-01-28 | End: 2019-01-28 | Stop reason: HOSPADM

## 2019-01-28 RX ORDER — ONDANSETRON 2 MG/ML
4 INJECTION INTRAMUSCULAR; INTRAVENOUS DAILY PRN
Status: DISCONTINUED | OUTPATIENT
Start: 2019-01-28 | End: 2019-01-28 | Stop reason: HOSPADM

## 2019-01-28 RX ORDER — FAMOTIDINE 20 MG/1
20 TABLET, FILM COATED ORAL 2 TIMES DAILY
Status: DISCONTINUED | OUTPATIENT
Start: 2019-01-28 | End: 2019-01-28 | Stop reason: HOSPADM

## 2019-01-28 RX ORDER — AMOXICILLIN 250 MG
1 CAPSULE ORAL 2 TIMES DAILY
Status: DISCONTINUED | OUTPATIENT
Start: 2019-01-28 | End: 2019-01-28 | Stop reason: HOSPADM

## 2019-01-28 RX ORDER — SODIUM CHLORIDE 0.9 % (FLUSH) 0.9 %
3 SYRINGE (ML) INJECTION EVERY 8 HOURS
Status: DISCONTINUED | OUTPATIENT
Start: 2019-01-28 | End: 2019-01-28 | Stop reason: HOSPADM

## 2019-01-28 RX ORDER — HYDROMORPHONE HYDROCHLORIDE 2 MG/ML
0.2 INJECTION, SOLUTION INTRAMUSCULAR; INTRAVENOUS; SUBCUTANEOUS EVERY 5 MIN PRN
Status: ACTIVE | OUTPATIENT
Start: 2019-01-28 | End: 2019-01-28

## 2019-01-28 RX ORDER — CELECOXIB 100 MG/1
200 CAPSULE ORAL 2 TIMES DAILY
Status: DISCONTINUED | OUTPATIENT
Start: 2019-01-28 | End: 2019-01-28 | Stop reason: HOSPADM

## 2019-01-28 RX ORDER — POVIDONE-IODINE 10 %
SOLUTION, NON-ORAL TOPICAL
Status: DISCONTINUED | OUTPATIENT
Start: 2019-01-28 | End: 2019-01-28 | Stop reason: HOSPADM

## 2019-01-28 RX ADMIN — SODIUM CHLORIDE, SODIUM LACTATE, POTASSIUM CHLORIDE, AND CALCIUM CHLORIDE: .6; .31; .03; .02 INJECTION, SOLUTION INTRAVENOUS at 08:01

## 2019-01-28 RX ADMIN — PHENYLEPHRINE HYDROCHLORIDE 100 MCG: 10 INJECTION INTRAVENOUS at 10:01

## 2019-01-28 RX ADMIN — CELECOXIB 400 MG: 100 CAPSULE ORAL at 08:01

## 2019-01-28 RX ADMIN — SODIUM CHLORIDE, SODIUM LACTATE, POTASSIUM CHLORIDE, AND CALCIUM CHLORIDE: .6; .31; .03; .02 INJECTION, SOLUTION INTRAVENOUS at 09:01

## 2019-01-28 RX ADMIN — TRANEXAMIC ACID 1950 MG: 650 TABLET, FILM COATED ORAL at 08:01

## 2019-01-28 RX ADMIN — ACETAMINOPHEN 1000 MG: 500 TABLET ORAL at 08:01

## 2019-01-28 RX ADMIN — PHENYLEPHRINE HYDROCHLORIDE 200 MCG: 10 INJECTION INTRAVENOUS at 09:01

## 2019-01-28 RX ADMIN — ROPIVACAINE HYDROCHLORIDE 20 ML: 5 INJECTION, SOLUTION EPIDURAL; INFILTRATION; PERINEURAL at 12:01

## 2019-01-28 RX ADMIN — CEFAZOLIN SODIUM 2 G: 2 SOLUTION INTRAVENOUS at 09:01

## 2019-01-28 RX ADMIN — PROPOFOL 150 MCG/KG/MIN: 10 INJECTION, EMULSION INTRAVENOUS at 09:01

## 2019-01-28 RX ADMIN — TRANEXAMIC ACID 1000 MG: 100 INJECTION, SOLUTION INTRAVENOUS at 10:01

## 2019-01-28 RX ADMIN — OXYCODONE HYDROCHLORIDE AND ACETAMINOPHEN 1 TABLET: 5; 325 TABLET ORAL at 01:01

## 2019-01-28 RX ADMIN — PHENYLEPHRINE HYDROCHLORIDE 100 MCG: 10 INJECTION INTRAVENOUS at 09:01

## 2019-01-28 RX ADMIN — PROPOFOL 10 MG: 10 INJECTION, EMULSION INTRAVENOUS at 09:01

## 2019-01-28 RX ADMIN — TRANEXAMIC ACID 1000 MG: 100 INJECTION, SOLUTION INTRAVENOUS at 11:01

## 2019-01-28 RX ADMIN — LIDOCAINE HYDROCHLORIDE 20 MG: 20 INJECTION, SOLUTION INTRAVENOUS at 09:01

## 2019-01-28 RX ADMIN — PHENYLEPHRINE HYDROCHLORIDE 200 MCG: 10 INJECTION INTRAVENOUS at 10:01

## 2019-01-28 RX ADMIN — BUPIVACAINE HYDROCHLORIDE 1.6 ML: 7.5 INJECTION, SOLUTION EPIDURAL; RETROBULBAR at 09:01

## 2019-01-28 RX ADMIN — SODIUM CHLORIDE, SODIUM LACTATE, POTASSIUM CHLORIDE, AND CALCIUM CHLORIDE: .6; .31; .03; .02 INJECTION, SOLUTION INTRAVENOUS at 10:01

## 2019-01-28 RX ADMIN — PREGABALIN 150 MG: 75 CAPSULE ORAL at 08:01

## 2019-01-28 RX ADMIN — DEXAMETHASONE SODIUM PHOSPHATE 10 MG: 4 INJECTION, SOLUTION INTRAMUSCULAR; INTRAVENOUS at 09:01

## 2019-01-28 NOTE — INTERVAL H&P NOTE
The patient has been examined and the H&P has been reviewed:    I concur with the findings and no changes have occurred since H&P was written.    Anesthesia/Surgery risks, benefits and alternative options discussed and understood by patient/family.          Active Hospital Problems    Diagnosis  POA    *Primary osteoarthritis of right knee [M17.11]  Yes      Resolved Hospital Problems   No resolved problems to display.

## 2019-01-28 NOTE — PLAN OF CARE
Postop prescriptions x5 delivered to patient/family at bedside from [facility] pharmacy.  C/o knee and back pain- see MAR for postop po pain medication given.

## 2019-01-28 NOTE — TRANSFER OF CARE
"Anesthesia Transfer of Care Note    Patient: Sly Villarreal    Procedure(s) Performed: Procedure(s) (LRB):  ARTHROPLASTY, KNEE, SIGHT ASSISTED (Right)    Patient location: PACU    Anesthesia Type: MAC and spinal    Transport from OR: Transported from OR on 2-3 L/min O2 by NC with adequate spontaneous ventilation    Post pain: adequate analgesia    Post assessment: no apparent anesthetic complications    Post vital signs: stable    Level of consciousness: awake and alert    Nausea/Vomiting: no nausea/vomiting    Complications: none    Transfer of care protocol was followed      Last vitals:   Visit Vitals  BP (!) 158/81   Pulse (!) 16   Temp 36.6 °C (97.8 °F) (Temporal)   Resp 16   Ht 5' 8" (1.727 m)   Wt 78.9 kg (174 lb)   SpO2 98%   BMI 26.46 kg/m²     "

## 2019-01-28 NOTE — ANESTHESIA PROCEDURE NOTES
Peripheral Block    Patient location during procedure: pre-op   Block not for primary anesthetic.  Reason for block: at surgeon's request and post-op pain management   Post-op Pain Location: Right knee  Start time: 1/28/2019 12:16 PM  Timeout: 1/28/2019 12:16 PM   End time: 1/28/2019 12:18 PM  Staffing  Anesthesiologist: Caio Low MD  Resident/CRNA: Kp Car MD  Performed: resident/CRNA   Preanesthetic Checklist  Completed: patient identified, site marked, surgical consent, pre-op evaluation, timeout performed, IV checked, risks and benefits discussed and monitors and equipment checked  Peripheral Block  Patient position: supine  Prep: ChloraPrep  Patient monitoring: heart rate, cardiac monitor, continuous pulse ox, continuous capnometry and frequent blood pressure checks  Block type: saphenous  Laterality: right  Injection technique: single shot  Needle  Needle type: Stimuplex   Needle gauge: 21 G  Needle length: 4 in  Needle localization: anatomical landmarks and ultrasound guidance   -ultrasound image captured on disc.  Assessment  Injection assessment: negative aspiration, negative parasthesia and local visualized surrounding nerve  Paresthesia pain: none  Heart rate change: no  Slow fractionated injection: yes  Additional Notes  VSS.  [facility] RN monitoring vitals throughout procedure.  Patient tolerated procedure well.

## 2019-01-28 NOTE — PT/OT/SLP EVAL
Physical Therapy Evaluation and Discharge Note    Patient Name:  Sly Villarreal   MRN:  [id number]    Recommendations:     Discharge Recommendations:  other (see comments)(Home with outpatient PT)   Discharge Equipment Recommendations: none   Barriers to discharge: None    Assessment:     Sly Villarreal is a 79 y.o. male admitted with a medical diagnosis of Primary osteoarthritis of right knee. .  At this time, patient has met criteria for same day discharge from functional mobility standpoint.Will Dc PT service at this time    Recent Surgery: Procedure(s) (LRB):  ARTHROPLASTY, KNEE, SIGHT ASSISTED (Right) Day of Surgery    Plan:     During this hospitalization, patient does not require further acute PT services.  Please re-consult if situation changes.      Subjective     Chief Complaint: pain  Patient/Family Comments/goals: go home  Pain/Comfort:  · Pain Rating 1: 5/10  · Location - Side 1: Right  · Location 1: knee  · Pain Addressed 1: Pre-medicate for activity, Reposition, Distraction    Patients cultural, spiritual, Mosque conflicts given the current situation:      Living Environment:  Lives with family 2 SH able to live downstairs, no concerns  Prior to admission, patients level of function was independent.  Equipment used at home: walker, rolling.  DME owned (not currently used): none.  Upon discharge, patient will have assistance from family.    Objective:     Communicated with primary nurse prior to session.  Patient found supine  upon PT entry to room found with: peripheral IV     General Precautions: Standard, fall   Orthopedic Precautions:RLE weight bearing as tolerated   Braces: N/A     Exams:  · RLE ROM: limited active and passive at knee  · RLE Strength: fair control with gait and transitional movements  · LLE ROM: WFL  · LLE Strength: WFL    Functional Mobility:  · Bed Mobility:     · Supine to Sit: supervision  · Sit to Supine: supervision  · Transfers:     · Sit to Stand:  stand by assistance  with rolling walker  · Gait: 35 ft with SBA to CG and RW vc for walker management  · Balance: fair + with RW    AM-PAC 6 CLICK MOBILITY  Total Score:21       Therapeutic Activities and Exercises:   reviewed passive ext ,qs and ankle pumps    AM-PAC 6 CLICK MOBILITY  Total Score:21     Patient left HOB elevated with all lines intact, call button in reach and dtg present.    GOALS:   Multidisciplinary Problems     Physical Therapy Goals     Not on file          Multidisciplinary Problems (Resolved)        Problem: Physical Therapy Goal    Goal Priority Disciplines Outcome Goal Variances Interventions   Physical Therapy Goal   (Resolved)     PT, PT/OT Outcome(s) achieved     Description:  Goals to be met by: 1/28/2019     No PT goals established                      History:     Past Medical History:   Diagnosis Date    Hypothyroid 2019    Type 2 diabetes mellitus 2019       Past Surgical History:   Procedure Laterality Date    CRYOTHERAPY, NERVE, PERIPHERAL, PERCUTANEOUS, USING LIQUID NITROUS OXIDE IN CLOSED NEEDLE DEVICE Right 1/24/2019    Performed by MONTANA Sutherland at [facility] OR       Clinical Decision Making:     History  Co-morbidities and personal factors that may impact the plan of care Examination  Body Structures and Functions, activity limitations and participation restrictions that may impact the plan of care Clinical Presentation   Decision Making/ Complexity Score   Co-morbidities:   [] Time since onset of injury / illness / exacerbation  [] Status of current condition  []Patient's cognitive status and safety concerns    [] Multiple Medical Problems (see med hx)  Personal Factors:   [] Patient's age  [] Prior Level of function   [] Patient's home situation (environment and family support)  [] Patient's level of motivation  [] Expected progression of patient      HISTORY:(criteria)    [] 12248 - no personal factors/history    [] 18097 - has 1-2 personal factor/comorbidity     [] 78410 - has >3 personal  factor/comorbidity     Body Regions:  [] Objective examination findings  [] Head     []  Neck  [] Trunk   [] Upper Extremity  [] Lower Extremity    Body Systems:  [] For communication ability, affect, cognition, language, and learning style: the assessment of the ability to make needs known, consciousness, orientation (person, place, and time), expected emotional /behavioral responses, and learning preferences (eg, learning barriers, education  needs)  [] For the neuromuscular system: a general assessment of gross coordinated movement (eg, balance, gait, locomotion, transfers, and transitions) and motor function  (motor control and motor learning)  [] For the musculoskeletal system: the assessment of gross symmetry, gross range of motion, gross strength, height, and weight  [] For the integumentary system: the assessment of pliability(texture), presence of scar formation, skin color, and skin integrity  [] For cardiovascular/pulmonary system: the assessment of heart rate, respiratory rate, blood pressure, and edema     Activity limitations:    [] Patient's cognitive status and saf ety concerns          [] Status of current condition      [] Weight bearing restriction  [] Cardiopulmunary Restriction    Participation Restrictions:   [] Goals and goal agreement with the patient     [] Rehab potential (prognosis) and probable outcome      Examination of Body System: (criteria)    [] 62156 - addressing 1-2 elements    [] 48546 - addressing a total of 3 or more elements     [] 79116 -  Addressing a total of 4 or more elements         Clinical Presentation: (criteria)  Choose one     On examination of body system using standardized tests and measures patient presents with (CHOOSE ONE) elements from any of the following: body structures and functions, activity limitations, and/or participation restrictions.  Leading to a clinical presentation that is considered (CHOOSE ONE)                              Clinical Decision  Making  (Eval Complexity):  Low- 95894     Time Tracking:     PT Received On: 01/28/19  PT Start Time: 1451     PT Stop Time: 1523  PT Total Time (min): 32 min     Billable Minutes: Evaluation 20      Richard Aquino, PT  01/28/2019

## 2019-01-28 NOTE — PLAN OF CARE
Problem: Occupational Therapy Goal  Goal: Occupational Therapy Goal  Outcome: Outcome(s) achieved Date Met: 01/28/19  OT johnathan performed, Pt to attend OP PT

## 2019-01-28 NOTE — ANESTHESIA PROCEDURE NOTES
Spinal    Diagnosis: right knee pain  Patient location during procedure: OR  Start time: 1/28/2019 9:29 AM  Timeout: 1/28/2019 8:28 AM  End time: 1/28/2019 9:34 AM  Staffing  Anesthesiologist: Jose Mendoza MD  Resident/CRNA: Kayode Diop MD  Performed: resident/CRNA and anesthesiologist   Preanesthetic Checklist  Completed: patient identified, site marked, surgical consent, pre-op evaluation, timeout performed, IV checked, risks and benefits discussed and monitors and equipment checked  Spinal Block  Patient position: sitting  Prep: ChloraPrep  Patient monitoring: heart rate and continuous pulse ox  Approach: midline  Location: L3-4  Injection technique: single shot  CSF Fluid: clear free-flowing CSF  Needle  Needle type: pencil-tip   Needle gauge: 24 G  Needle length: 3.5 in  Additional Documentation: negative aspiration for heme  Needle localization: anatomical landmarks  Assessment  Sensory level: T6   Dermatomal levels determined by alcohol wipe  Ease of block: easy  Patient's tolerance of the procedure: comfortable throughout block and no complaints

## 2019-01-28 NOTE — PLAN OF CARE
Ambulatory with physical therapy.  Urinated once without difficulty.  PIV removed with catheter intact.  Discharge teaching complete with patient and daughter.

## 2019-01-28 NOTE — PLAN OF CARE
Signed out from pacu per Dr Low. 1310- report given to Venecia Rn/ops. Transported to ops via stretcher,sr upx2.

## 2019-01-28 NOTE — PLAN OF CARE
Problem: Physical Therapy Goal  Goal: Physical Therapy Goal  Goals to be met by: 1/28/2019     No PT goals established    Outcome: Outcome(s) achieved Date Met: 01/28/19  Patient met criteria for same day discharge from functional mobility standpoint  Will DC PT service  Patient with planned outpatient PT service beginning 1/29/2019

## 2019-01-28 NOTE — PT/OT/SLP EVAL
Occupational Therapy   Evaluation and Discharge Note    Name: Sly Villarreal  MRN: [id number]  Admitting Diagnosis:  Primary osteoarthritis of right knee Day of Surgery    Recommendations:     Discharge Recommendations: other (see comments)(OP PT)  Discharge Equipment Recommendations:  none  Barriers to discharge:  Inaccessible home environment    Assessment:     Sly Villarreal is a 79 y.o. male with a medical diagnosis of Primary osteoarthritis of right knee. At this time, patient is functioning at their prior level of function and does not require further acute OT services.     Plan:     During this hospitalization, patient does not require further acute OT services.  Please re-consult if situation changes.    · Plan of Care Reviewed with: patient, daughter    Subjective     Chief Complaint: knee pain  Patient/Family Comments/goals: to pray again    Occupational Profile:  Living Environment: lives w/daughter in2 story house, single HR to 2nd floor; 1/2 bath downstairs  Previous level of function: mod I  Roles and Routines:   Equipment Used at home:  walker, rolling  Assistance upon Discharge: family    Pain/Comfort:  · Pain Rating 1: 5/10  · Location - Side 1: Right  · Location - Orientation 1: generalized  · Location 1: knee  · Pain Addressed 1: Pre-medicate for activity, Cessation of Activity, Reposition, Nurse notified, Distraction  · Pain Rating Post-Intervention 1: 5/10    Patients cultural, spiritual, Catholic conflicts given the current situation: yes    Objective:     Communicated with: nurse prior to session.  Patient found HOB elevated with peripheral IV upon OT entry to room.    General Precautions: Standard, fall   Orthopedic Precautions:RLE weight bearing as tolerated   Braces:       Occupational Performance:    Bed Mobility:    · Patient completed Rolling/Turning to Left with  stand by assistance  · Patient completed Rolling/Turning to Right with stand by assistance  · Patient completed  Scooting/Bridging with stand by assistance  · Patient completed Supine to Sit with stand by assistance  · Patient completed Sit to Supine with stand by assistance    Functional Mobility/Transfers:  · Patient completed Sit <> Stand Transfer with stand by assistance and contact guard assistance  with  rolling walker   · Patient completed Bed <> Chair Transfer using Step Transfer technique with stand by assistance and contact guard assistance with rolling walker  · Patient completed Toilet Transfer Step Transfer technique with stand by assistance and contact guard assistance with  rolling walker  · Functional Mobility: SBA w/RW and mod cues    Activities of Daily Living:  · Lower Body Dressing: minimum assistance and moderate assistance decreased safety awareness and cultural restrictions  · Toileting: contact guard assistance      Cognitive/Visual Perceptual:  AO4    Physical Exam:  BUE AROM/strength WFL  Fair+ balance    AMPAC 6 Click ADL:  AMPAC Total Score: 19    Treatment & Education:  Pt participating in initial OT/PT evaluations this date. Pt educated on adaptive dsg post surgery, home safety, car/toilet/shower/tub t/fs, use of DME for functional mobility and ADLs. Pt to attend OP PT tomorrow's date. D/c OT     Education:    Patient left HOB elevated with all lines intact, call button in reach, nurse notified and daughter present    GOALS:   Multidisciplinary Problems     Occupational Therapy Goals     Not on file          Multidisciplinary Problems (Resolved)        Problem: Occupational Therapy Goal    Goal Priority Disciplines Outcome Interventions   Occupational Therapy Goal   (Resolved)     OT, PT/OT Outcome(s) achieved                    History:     Past Medical History:   Diagnosis Date    Hypothyroid 2019    Type 2 diabetes mellitus 2019       Past Surgical History:   Procedure Laterality Date    CRYOTHERAPY, NERVE, PERIPHERAL, PERCUTANEOUS, USING LIQUID NITROUS OXIDE IN CLOSED NEEDLE DEVICE Right  "2019    Performed by MONTANA Sutherland at [facility] OR       Clinical Decision Makin.  OT Low:  "Pt evaluation falls under low complexity for evaluation coding due to performance deficits noted in 1-3 areas as stated above and 0 co-morbities affecting current functional status. Data obtained from problem focused assessments. No modifications or assistance was required for completion of evaluation. Only brief occupational profile and history review completed."     Time Tracking:     OT Date of Treatment: 19  OT Start Time: 1451  OT Stop Time: 1522  OT Total Time (min): 31 min    Billable Minutes:Evaluation 15  Therapeutic Activity 15    [place] OT  2019    "

## 2019-01-28 NOTE — DISCHARGE INSTRUCTIONS
1. Enteric coated aspirin 81 mg by mouth twice a day for 6 weeks to prevent blood clots,  unless otherwise indicated.  2. Please take a stomach reflux medication such as pepcid, prevacid, nexium (H-2 blocker or PPI) while on aspirin to prevent stomach ulcers. You will be given a prescription for pepcid.  3. Byron stockings should be worn as much as possible for 6 weeks to prevent blood clots.  4. Do not start antibiotics for any suspected infections related to the surgery until evaluated by dr fagan staff  5. No driving for approx. 2-4 weeks  6. You can shower once the incision is completely dry, otherwise place a new dry dressing twice a day if there is drainage. Please call the office if the drainage increases after discharge.  7. You may resume all pre-surgery medications unless otherwise indicated  8. All patients should be seen in Dr Fagan office approx 2 weeks after surgery  9. Dr Bro prefers outpatient physical therapy upon discharge home. If home PT is needed, please contact Dr Bro for approval  10. Patients should see their primary care doctor after discharge home  11. If you are sent to a rehab/nursing facility please notify dr fagan office once discharged.  12. Only lortab or Percocet should be given for pain medications on discharge. You will be given a prescription for 1 pill every 4 hrs as needed for the first 2 weeks. For weeks 2-6 you can receive a prescription for Percocet or lortab 1 pill every 6 hrs as needed. For weeks 6-12 you can receive a prescription for 1 pill every 8 hrs as needed. There will be no pain medications given after 12 weeks. After 12 weeks, you should take Tylenol, motrin, advil, aleve for pain control.  13. Please take a stomach reflux medication such as pepcid, prevacid, nexium (H-2 blocker or PPI) while on anti inflammatory medications such as advil, aleve, motrin to prevent stomach ulcers.          Discharge Instructions for Total Knee Replacement  You have undergone  knee replacement surgery. The knee joint forms where the thighbone, shinbone, and kneecap meet. The knee joint is supported by muscles and ligaments, and is lined with a cushioning called cartilage. Over time, cartilage wears away. This can make the knee feel stiff and painful. Your doctor replaced your painful joint with an artificial joint to relieve pain and restore movement. Here are some instructions to follow once at home.  Home care  · When your doctor says it's OK to shower, carefully wash your incision with soap and water. Rinse the incision well. Then gently pat it dry. Dont rub the incision, or apply creams or lotions. Sit on a shower stool or chair when you shower to keep from falling.  · Take pain medicine as directed by your doctor.  Sitting and sleeping  · Sit in chairs with arms. The arms make it easier for you to stand up or sit down.  · Dont sit for more than 30 to 45 minutes at one time.  · Nap if you are tired, but dont stay in bed all day.  · Sleep with a pillow under your ankle, not your knee. Be sure to change the position of your leg during the night.  Moving safely  · The key to successful recovery is movement with walking and exercising your knee as directed by your doctor. You should be able to start moving your leg shortly after surgery as directed by your doctor.    · Walk up and down stairs with support. Try one step at a time. Use the railing if possible.  · Dont drive until your doctor says its OK. Most people can start driving about 6 weeks after surgery. Dont drive while you are taking opioid pain medication.  Other precautions  · Avoid soaking your knee in water (no hot tubs, bathtubs, swimming pools) until your doctor says its OK.  · Wear the support stockings you were given in the hospital, as instructed by your doctor. You may wear these stockings for 4 to 6 weeks after surgery. If needed, you can place a bandage over the incision to prevent irritation from clothing or  support stockings.  · Arrange your household to keep the items you need handy. Keep everything else out of the way. Remove items that may cause you to fall, such as throw rugs and electrical cords.  · Use nonslip bath mats, grab bars, an elevated toilet seat, and a shower chair in your bathroom.  · Until your balance, flexibility, and strength improve, use a cane, crutches, a walker, handrails, or someone to help you.  · Keep your hands free by using a backpack, miki pack, apron, or pockets to carry things.  · Prevent infection. Ask your doctor for instructions if you havent already received them. Any infection will need to be treated immediately. Call your doctor right away if you think you might have an infection.  · Tell your dentist that you have an artificial joint and take antibiotics as prescribed before any dental work.  · Tell all your healthcare providers about your artificial joint before any medical procedure.  · Maintain a healthy weight. Get help to lose any extra pounds. Added body weight puts stress on the knee.  · Take any medicine you may have been given after surgery. This may include blood-thinning medicine to prevent blood clots or antibiotics to prevent infection.  Follow-up care  Follow up with your healthcare provider, or as advised. You will need to have your staples removed 2 to 3 weeks after surgery.     When to call your healthcare provider  Call 911 right away if you have:  · Chest pain  · Shortness of breath  · Any pain or tenderness in your calf  Otherwise, call your healthcare provider right away if you have:  · Fever of 100.4 °F (38 °C) or higher, or as advised  · Shaking chills  · Stiffness, or inability to move the knee  · Increased swelling in your leg  · Increased redness, tenderness, or swelling in or around the knee incision  · Drainage from the knee incision  · Increased knee pain   Date Last Reviewed: 7/1/2016  © 3258-0796 [employer]. [address] [place] [address],  [place] [address]. All rights reserved. This information is not intended as a substitute for professional medical care. Always follow your healthcare professional's instructions.          ANESTHESIA  -For the first 24 hours after surgery:  Do not drive, use heavy equipment, make important decisions, or drink alcohol  -It is normal to feel sleepy for several hours.  Rest until you are more awake.  -Have someone stay with you, if needed.  They can watch for problems and help keep you safe.  -Some possible post anesthesia side effects include: nausea and vomiting, sore throat and hoarseness, sleepiness, and dizziness.    PAIN  -If you have pain after surgery, pain medicine will help you feel better.  Take it as directed, before pain becomes severe.  Most pain relievers taken by mouth need at least 20-30 minutes to start working.  -Do not drive or drink alcohol while taking pain medicine.  -Pain medication can upset your stomach.  Taking them with a little food may help.  -Other ways to help control pain: elevation, ice, and relaxation  -Call your surgeon if still having unmanageable pain an hour after taking pain medicine.  -Pain medicine can cause constipation.  Taking an over-the counter stool softener while on prescription pain medicine and drinking plenty of fluids can prevent this side effect.  -Call your surgeon if you have severe side effects like: breathing problems, trouble waking up, dizziness, confusion, or severe constipation.    NAUSEA  -Some people have nausea after surgery.  This is often because of anesthesia, pain, pain medicine, or the stress of surgery.  -Do not push yourself to eat.  Start off with clear liquids and soup.  Slowly move to solid foods.  Don't eat fatty, rich, spicy foods at first.  Eat smaller amounts.  -If you develop persistent nausea and vomiting please notify your surgeon immediately.    BLEEDING  -Different types of surgery require different types of care and dressing changes.   It is important to follow all instructions and advice from your surgeon.  Change dressing as directed.  Call your surgeon for any concerns regarding postop bleeding.    SIGNS OF INFECTION  -Signs of infection include: fever, swelling, drainage, and redness  -Notify your surgeon if you have a fever of 100.4 F (38.0 C) or higher.  -Notify your surgeon if you notice redness, swelling, increased pain, pus, or a foul smell at the incision site.

## 2019-01-28 NOTE — BRIEF OP NOTE
[facility]  Brief Operative Note     SUMMARY     Surgery Date: 1/28/2019     Surgeon(s) and Role:     * Marcus Bro MD - Primary    Assisting Surgeon: None    Pre-op Diagnosis:  Osteoarthritis of right knee, unspecified osteoarthritis type [M17.11]    Post-op Diagnosis:  Post-Op Diagnosis Codes:     * Osteoarthritis of right knee, unspecified osteoarthritis type [M17.11]    Procedure(s) (LRB):  ARTHROPLASTY, KNEE, SIGHT ASSISTED (Right)    Anesthesia: Spinal    Description of the findings of the procedure: R TKA    Findings/Key Components: OA R knee    Estimated Blood Loss: * No values recorded between 1/28/2019 12:00 AM and 1/28/2019  9:51 AM *         Specimens:   Specimen (12h ago, onward)    None          Discharge Note    SUMMARY     Admit Date: 1/28/2019    Discharge Date and Time: 1/28/19    Hospital Course (synopsis of major diagnoses, care, treatment, and services provided during the course of the hospital stay): Patient presented to [facility] on day of scheduled surgery and underwent right total knee arthroplasty, please see operative report for further detail. Patient did well postoperatively and was found to be fit for discharge on same. Discharged home.    Final Diagnosis: Post-Op Diagnosis Codes:     * Osteoarthritis of right knee, unspecified osteoarthritis type [M17.11]    Disposition: Home or Self Care    Follow Up/Patient Instructions:     Medications:  Reconciled Home Medications:      Medication List      START taking these medications    aspirin 81 MG EC tablet  Commonly known as:  ECOTRIN  Take 1 tablet (81 mg total) by mouth 2 (two) times daily before meals.     cephALEXin 500 MG capsule  Commonly known as:  KEFLEX  Take 1 capsule (500 mg total) by mouth every 6 (six) hours. for 4 doses     docusate sodium 50 MG capsule  Commonly known as:  COLACE  Take 1 capsule (50 mg total) by mouth every 6 (six) hours.     famotidine 20 MG tablet  Commonly known as:  PEPCID  Take 1  tablet (20 mg total) by mouth 2 (two) times daily.     oxyCODONE-acetaminophen 5-325 mg per tablet  Commonly known as:  PERCOCET  Take 1 tablet by mouth every 4 to 6 hours as needed for Pain.        CONTINUE taking these medications    levothyroxine 25 MCG tablet  Commonly known as:  SYNTHROID  Take 25 mcg by mouth once daily.     meloxicam 15 MG tablet  Commonly known as:  MOBIC  TAKE 1 TABLET BY MOUTH DAILY WITH FOOD  FOR PAIN     metFORMIN 500 MG tablet  Commonly known as:  GLUCOPHAGE  Take 500 mg by mouth 2 (two) times daily with meals.     VITAMIN D2 50,000 unit Cap  Generic drug:  ergocalciferol  Take 1 capsule (50,000 Units total) by mouth every 7 days.        STOP taking these medications    HYDROcodone-acetaminophen 5-325 mg per tablet  Commonly known as:  NORCO          Discharge Procedure Orders   Keep surgical extremity elevated     Ice to affected area     Lifting restrictions     No driving until:     Activity as tolerated     Follow-up Information     Marcus Bro MD In 2 weeks.    Specialty:  Orthopedic Surgery  Contact information:  [address]  [address]  [place] [address] [phone]

## 2019-01-28 NOTE — OP NOTE
Procedure Note [facility]:      DATE OF PROCEDURE:1/28/2019     PREOPERATIVE DIAGNOSIS: right knee bone-on-bone osteoarthritis.     POSTOPERATIVE DIAGNOSIS: right knee bone-on-bone osteoarthritis.     PROCEDURE: Computer-assisted right total knee arthroplasty with resurfaced patella.     ATTENDING SURGEON: Marcus Bro M.D.   ASSISTANT: lanie Meyers     Risk Adjustment: Please see media tab for any additional diagnoses faxed from my office related to theKA.    COMPLICATIONS: None.     IMPLANTS:   1. landon next gen tibial tray, size 5  2. landon next gen Femoral component, size   G  right  3. landon next gen all-poly patella, size 32    4. landon next gen posterior stabilized highly crosslinked  polyethylene, 10 mm height.   5. antibiotic bone cement x2     INDICATIONS FOR PROCEDURE: This is a 79 y.o. male with longstanding knee pain. They have failed nonoperative management including injections. Risks and benefits of total knee arthroplasty were explained to the patient. The patient agreed to move forward with total knee arthroplasty.     FINDINGS: The patient had a complete articular cartilage loss down to subchondral bone throughout the knee.     PROCEDURE IN DETAIL: The patient had adductor nerve block prior to entry to the OR. The patient was then brought to the Operating Room and spinal anesthesia started without any difficulties. The patient was placed supine on the operative table. Long catheter was then placed and removed at the end of the case. Right knee was then prepped and draped in sterile fashion. Prior to incision, proper site and procedure as well as antibiotic administration was verified. AFCN and ISN nerves were then injected with marcaine. Anterior midline incision was created overlying center patella proximally to the medial border of the tibial tubercle distally. Skin, subcutaneous fat and deep fascial layer were sharply incised until we came to extensor mechanism retinaculum. Flap was  then elevated medially to VMO and laterally to lateral border of patella. Knee was then aspirated and synovial fluid sent for cell count. Medial parapatellar arthrotomy was injected with Marcaine with epinephrine and a medial parapatellar arthrotomy was then created. Synovium overlying the anterolateral distal femur was then excised as well as the infrapatellar tendon fat pad. Sleeve of soft tissue was elevated off the proximal medial tibia. Periphery of the patella was denervated using bovie cautery, Hohmann retractors then placed medially and laterally along the distal femur to protect the collateral ligaments. ACL and anterior horn of lateral meniscus were then transected. We then pinned our navigation tracker on to distal femur and then performed our anatomy survey for the femur. We placed distal femoral cutting guide such that we were perpendicular to mechanical axis, aiming for about 1 degree of flexion and about 9 mm of bony resection. Distal femur was then resected. Next, we placed AP sizing guide on distal femur and measured for a size G femoral component. We then luis out Whitesides line and placed our AP cutting block such that we were perpendicular to Whitesides line and created 2 pin holes in 3  degrees of external rotation relative to the posterior condylar axis.being parallel to transepicondylar axis and perpidicualar to deloress line. Resected posterior femoral bone measured approx. 3 mm in difference with the lateral piece thinner than the medial piece. Next, we subluxed the tibia forward and resected medial and lateral menisci. We then pinned our navigation tracker on to the proximal tibia and then performed our anatomy survey for tibia. We placed our proximal tibial cutting guide such that we were perpendicular to mechanical axis, aiming for about 1 to 2 mm of bony resection medially and about 4 degrees posterior slope. Proximal tibial bone was then resected and detached from posterior soft  tissues using Bovie cautery. Next, with knee at 90 degrees, we placed a laminar  in lateral compartment and resected the ACL and the PCL as well as small osteophytes posteriorly along the femur. We then injected the posterior capsule with marcaine. We then assessed our gaps using a 10 mm spacer block. With a 10 mm spacer block, the knee came out to full extension with nice stability with varus and valgus stress, and nice symmetry between flexion and extension. We assessed our tibial cut and our alignment shantelle fell within the center of the ankle. Once we were happy with soft tissue sleeves and bony cuts, we then placed tibial protector on the proximal tibia and our chamfer cutting guide for the femur. We then created our chamfer cuts. We then placed our box cutting guide as lateral as possible while maintaining a symmetric condylar resection and reamed for our box cut. Next, we subluxed the tibia forward, and sized our proximal tibia for a size 5 baseplate, the center of which was rotated such that it was in line with medial third tibial tubercle. This was then pinned in place. We then trialed using a 10 trial polyethylene. With10 mm polyethylene, the knee came out to full extension. We had nice stability with varus and valgus stress and nice symmetry between flexion and extension. Patella tracked centrally within trochlear groove. We then everted our patella and measured our patellar  thickness for 23 mm. We then resected down to approximately 14 mm of remaining bone. We then sized patella for a 32 patellar button. Three peg holes were then drilled for the patellar button and a patellar trial was then placed. We then assessed our tracking. Patella tracked centrally within trochlear groove. Once we were happy with all our trial components, we then removed all our trial components, except the tibial baseplate. We then reamed and broached for the tibial baseplate. We then drilled the sclerotic bone along the  tibia using a short (4mm) drill bit as well. We then placed the knee in extension and examined the posterior aspect of knee for bleeding. Once we achieve hemostasis, we then packed the knee and inflated the tourniquet. We then prepared our bony surfaces for cementation using pulse lavage antibiotic saline. Femoral component cement was then placed using a pressurized cement gun and then the femoral component impacted and all excess bony cement removed from the periphery and box. Cement gun was then used to cement the tibial bone and then the tibial component was impacted using direct impaction and all excess bony cement was removed from the periphery.We then reduced the knee, everted the patella and cemented the patellar component on last. We then paused to allow for cement to harden. Once cement was hardened, we then let the tourniquet down and reexamined our gaps, stability and tracking; all of which remained unchanged. We then copiously irrigated the knee with pulse lavage betadine saline. We then closed our medial parapatellar arthrotomy with a running #2 barbed suture, subcutaneous deep fascial layer was closed with simple interrupted # 1 vicryl suture, subcutaneous skin with 2-0 Vicryl and incision with Dermabond and Steri-Strips, was then dressed with silver water proof dressing. The patient was then transferred to Recovery Room bed in stable condition.

## 2019-01-28 NOTE — H&P
Chief Complaint: Primary knee OA     Date for Evaluation: 1/25/2019     Planned Procedure: 01/28/2019     Date of Procedure: 01/28/2019     Surgeon: Dr. Bro     HPI: I had seen this pleasant 79 y.o. male in the pre-op clinic today prior to his elective right knee replacement He was accompanied by his daughter. The patient has a history of OA in the right knee.     Active Cardiac Conditions: His history is not suggestive of unstable coronary syndrome, decompensated heart failure, significant arrhythmias, severe valvular disease.     Exercise Tolerance: He can undertake activities such as vacuuming, shopping, golfing, running and mowing without chest pain or shortness of breath making his exercise tolerance more than 4 METs.      Clinical Cardiac Risk Factors: He does not have a history of Cerebral Vascular Disease, Diabetes Mellitus and Renal Impairment He does have a history of Diabetes, last hemoglobin A1c 6.5 (01/10/2019)     Current Anticoagulants and Antiplatelet Therapy: No     ROS     Constitutional: Appetite and weight stable.  HEENT: No double, blurring of vision.   Cardiac: As stated above.  Respiratory: Negative for cough, phlegm, shortness of breath.  GI: Negative for nausea, vomiting, diarrhea.  Gu: No Dysuria, no Hematuria.  MS: Muscle ache: Back pain, joint pain/swelling.  Neuro: No numbness, tingling or weakness.  Endocrine: Not a Diabetic.  Derm: No rashes.  Heme/Onc:  No easy bruising. No bleeding complications with previous surgeries.           Past Medical History:   Diagnosis Date    Hypothyroid 2019    Type 2 diabetes mellitus 2019               Past Surgical History:   Procedure Laterality Date    CRYOTHERAPY, NERVE, PERIPHERAL, PERCUTANEOUS, USING LIQUID NITROUS OXIDE IN CLOSED NEEDLE DEVICE Right 1/24/2019     Performed by MONTANA Sutherland at [facility] OR         SOCIAL HISTORY  Social History           Tobacco Use    Smoking status: Never Smoker   Substance Use Topics    Alcohol use: No     Drug use: Not on file         No family history on file.          MEDICATION HISTORY       Current Outpatient Medications   Medication Sig    ergocalciferol (ERGOCALCIFEROL) 50,000 unit Cap Take 1 capsule (50,000 Units total) by mouth every 7 days.    hydrocodone-acetaminophen 5-325mg (NORCO) 5-325 mg per tablet Take 1 tablet by mouth every 4 (four) hours as needed for Pain.    levothyroxine (SYNTHROID) 25 MCG tablet Take 25 mcg by mouth once daily.    meloxicam (MOBIC) 15 MG tablet TAKE 1 TABLET BY MOUTH DAILY WITH FOOD  FOR PAIN    metFORMIN (GLUCOPHAGE) 500 MG tablet Take 500 mg by mouth 2 (two) times daily with meals.      No current facility-administered medications for this visit.          ALLERGIES   Review of patient's allergies indicates:  No Known Allergies      VITALS      Vitals:     01/25/19 1043   BP: 128/72   Pulse: (!) 116            PHYSICAL EXAMINATION     General:  Well developed, well nourished, no acute distress   Eye: Pupils equal, reactive to light, no conjunctival icterus.   Neck: No thyromegaly, carotid bruit, JVD  Heart: Normal S1 S2, no murmurs, or gallops  Respiratory: normal respiratory effort, bilateral equal air entry.   Abdomen: Soft, non-tender, liver and spleen not palpable  Lymphatic: No cervical, axillary, inguinal, lymphadenopathy   Musculoskeletal: no clubbing, DROM of right knee   Neurological: Conscious, coherent, equal strength, bilaterally on both upper and lower extremities     LAB STUDIES     Blood Tests     CBC:        Lab Results   Component Value Date     WBC 7.89 01/10/2019     HGB 14.5 01/10/2019     HCT 44.0 01/10/2019     MCV 85 01/10/2019      01/10/2019         BMP        Lab Results   Component Value Date      01/10/2019     K 3.7 01/10/2019      01/10/2019     CO2 23 01/10/2019     BUN 19 01/10/2019     CREATININE 1.1 01/10/2019     CALCIUM 9.4 01/10/2019     ANIONGAP 10 01/10/2019     ESTGFRAFRICA >60 01/10/2019     EGFRNONAA >60  01/10/2019            Coags:        Lab Results   Component Value Date     INR 1.0 01/10/2019         EKG: reviewed by [facility]. No comparable EKG     ECHO: Transthoracic echo (TTE) complete (Cupid Only)  · Normal left ventricular systolic function. The estimated ejection   fraction is 60%  · Grade I (mild) left ventricular diastolic dysfunction consistent with   impaired relaxation.  · Normal right ventricular systolic function.  · Mild aortic regurgitation.  · Mild mitral regurgitation.  · The aortic valve is bileaflet.  · Mild tricuspid regurgitation.  · Normal left atrial pressure.  · Normal central venous pressure (3 mm Hg).  · The estimated PA systolic pressure is 23 mm Hg           ASSESSMENT AND PLAN     Sly Villarreal primary Knee OA     Pre-operative cardiac risk assessment:      He does not have any active cardiac conditions, clinical cardiac risk factors and his exercise tolerance is more than 4 METS.     Plan:    The patient has failed non operative management, has painful crepitus, and has swelling. The natural history of degenerative arthritis was discussed at length and nonoperative and operative treatment was reviewed. Due to the pain and associated disability, I recommend left total knee replacement. The risks, benefits, convalescence, and alternatives were reviewed. Numerous questions were asked and answered. Models were used as an education tool. Surgery will be scheduled at a convenient time. The discussion with the patient included a description of a total knee replacement. A total knee replacement (TKR) means a resurfacing of all three surfaces-the patella, femur, and tibia, with metal and plastic parts. The prosthetic parts are usually cemented into position and will allow a patient a full range of motion from. The postoperative motion, however, is determined by multiple factors, the most important of which is preoperative motion. In general, the better the motion preoperatively, the better  the motion postoperatively. In younger patients I will generally use an uncemented tibial component and leave the patella unresurfaced, in an effort to preserve bone stock for any future revision surgeries. In those patients we discuss the risk of anterior knee pain in a small subset of patients (5-10%) with an unresurfaced patella. The operation, pending medical clearance, generally requires a hospitalization of 0-3 days for one knee (three-four days for a bilateral replacement). In general, we prefer to perform the procedure under epidural/spinal anesthesia. Patient blood donation will be based on the individual patient's preoperative hemoglobin/hematocrit levels. The operation will be done preferably in a minimally invasive fashion which will facilitate recovery. While performing the procedure in a minimally invasive manner is our preference, some patients are not candidates. In that situation, a non-minimally invasive technique will be performed. This will not adversely affect the long term success of the TKR. Postoperatively, the patient is walking the day of surgery and may be discahrged the day of surgery if stable with a walker or cane. The first couple of days are very painful and the pain medication will alleviate but not eliminate the pain. Thus, the patient must really push hard to get the range of motion. The cane is to be dispensed with once the patient is secure enough. In general, there is no cast or brace required with a routine knee replacement. In the long term, we do not encourage our patients to run for the sake of running; although, pending their preoperative status, we often allow patients to play doubles tennis or comparable activities. We also allow them to do gentle intermediate downhill skiing if they are truly an expert skier. Biking is encouraged as well as swimming. The follow-up periods are usually at three weeks, 3 months, six months, and yearly intervals. Potential complications with  total knee replacements include infection (less than 2%), which is much higher in patients with obesity, diabetes, or other conditions which adversely affect the immune system. (Patients with a previous history of osteomyelitis can have infection rates as high as 15%). By nerve damage we mean a peroneal nerve palsy with a foot drop (a flapping foot with ambulation). This is particularly more apt to occur with a bad valgus (knock knee) deformity. The incidence can be quite high in this particular patient population. There are always areas of skin numbness, but this is not an untoward effect, nor do we consider it a complication. Other potential complications include dislocation of the patellar component (less than 2%). The loosening of either the tibial or femoral component is much more infrequent. It usually occurs with infection or long-term use in a patient population that is at extreme risk (e.g., markedly overweight and not conscientious about their exercises). Major blood vessel damage is also extremely rare. Theoretically, because of the anatomic proximity of the popliteal artery, it could be lacerated with subsequent repairs required. Albeit unlikely, a disruption of the popliteal artery could theoretically result in an amputation. Similarly, infection could theoretically result in an amputation if one were to grow out an organism that cannot be controlled with antibiotics. General medical complications include phlebitis for which we prophylactically anticoagulate, but it could still occur and fatal pulmonary embolus has been reported. Cardiovascular problems, such as a heart attack or ischemia, are always a concern with such hemodynamic changes in the blood vascular system. Other general complications are very rare but in medicine anything could theoretically happen. We also discussed performing a pre-operative peripheral sensory block of the bracnhes of the AFCN and ISN of the same knee using iovera to help  with pain control post surgery. This is a relatively new technology with early data demonstrating significant pain improvement and functional recovery. However the science defining all the associated risks is still developing. I think the patient understands the risk benefit ratio of total knee replacement and the iovera treatment and would like to pursue the total knee replacement and iovera treatment. we will begin the pre-operative process. Additionally, this case will undergo peer review for appropriateness of care during our departmental weekly indications conference prior to surgery.     There are no changes to the H&P documented above    Hipolito Vides  [facility]

## 2019-01-29 ENCOUNTER — CLINICAL SUPPORT (OUTPATIENT)
Dept: REHABILITATION | Facility: HOSPITAL | Age: 79
End: 2019-01-29
Payer: MEDICARE

## 2019-01-29 ENCOUNTER — PATIENT OUTREACH (OUTPATIENT)
Dept: ADMINISTRATIVE | Facility: CLINIC | Age: 79
End: 2019-01-29

## 2019-01-29 DIAGNOSIS — G89.29 CHRONIC PAIN OF RIGHT KNEE: ICD-10-CM

## 2019-01-29 DIAGNOSIS — M25.661 DECREASED RANGE OF MOTION OF RIGHT KNEE: ICD-10-CM

## 2019-01-29 DIAGNOSIS — M25.561 CHRONIC PAIN OF RIGHT KNEE: ICD-10-CM

## 2019-01-29 DIAGNOSIS — R26.89 IMPAIRED GAIT AND MOBILITY: ICD-10-CM

## 2019-01-29 DIAGNOSIS — R29.898 DECREASED STRENGTH OF LOWER EXTREMITY: ICD-10-CM

## 2019-01-29 PROCEDURE — 97110 THERAPEUTIC EXERCISES: CPT | Mod: PN

## 2019-01-29 PROCEDURE — G8979 MOBILITY GOAL STATUS: HCPCS | Mod: CK,PN

## 2019-01-29 PROCEDURE — G8978 MOBILITY CURRENT STATUS: HCPCS | Mod: CL,PN

## 2019-01-29 PROCEDURE — 97161 PT EVAL LOW COMPLEX 20 MIN: CPT | Mod: PN

## 2019-01-29 NOTE — PLAN OF CARE
[facility]  Physical Therapy Initial Evaluation        Name: Sly Villarreal  Clinic Number: [id number]    Therapy Diagnosis:   Encounter Diagnoses   Name Primary?    Chronic pain of right knee     Decreased range of motion of right knee     Decreased strength of lower extremity     Impaired gait and mobility      Physician: Marcus Bro MD    Physician Orders: PT Eval and Treat   Medical Diagnosis from Referral: primary osteoarthritis of right knee  Evaluation Date: 1/29/2019  Authorization Period Expiration: 12/31/19  Plan of Care Expiration: 1/29/2019 to 3/15/19  Visit # / Visits authorized: 1/ 50  FOTO: 1/10    Time In: 2:15 pm   Time Out: 3:00 pm   Total Billable Time: 45 minutes    Precautions: Standard and Diabetes    *pt's daughter assisted with translation during evaluation  Subjective     Date of onset: DOS: 1/28/19    History of current condition - Sly reports: he had a right TKA on 1/28/19       Past Medical History:   Diagnosis Date    Hypothyroid 2019    Type 2 diabetes mellitus 2019     Sly Villarreal  has a past surgical history that includes percutaneous cryotherapy of peripheral nerve using liquid nitrous oxide in closed needle device (Right, 1/24/2019).    Sly has a current medication list which includes the following prescription(s): aspirin, cephalexin, docusate sodium, ergocalciferol, famotidine, levothyroxine, meloxicam, metformin, and oxycodone-acetaminophen, and the following Facility-Administered Medications: tranexamic acid.    Review of patient's allergies indicates:  No Known Allergies     Imaging, xray of (R) knee: see imaging section in pt chart review     Prior Therapy: yes -for his back   Social History: lives with adult daughter who is able to assist pt as needed. No steps/stairs to enter 2 story house. Pt is able to live on 1st floor.   Occupation: Not employed  Prior Level of Function: independent   Current Level of Function:  RW    Pain:  Current 8/10, worst 8/10, best 5/10   Location: right knee   Description: Sharp  Aggravating Factors: Standing, Walking and Getting out of bed/chair  Easing Factors: pain medication, ice, rest and elevation    Pts goals: decrease pain, improve mobility     Objective   AROM:   right knee: 8- 95 degrees    PROM:   Right knee:  5- 100 degrees     L/E Strength w/ MicroFET Muscle Letty Dynamometer Right Left Pain/Dysfunction with Movement   (approx 4 sec hold w/ max contraction)   Hip Flexion 4.3 kg  11.6 kg     Hip Abduction 9.6 kg  13.3 kg     Quadriceps 5.1 kg  9.9 kg     Hamstrings 9.2 kg  13.2 kg       TU seconds (w/ RW)    30 second sit-to-stand test (without U/E support): 0 (4 w/ UE support)    KOOS Knee Survey:    Symptoms:   Pain:    Functional, Daily Livin/68 (69% disabled)  Function, Sports and Recreational Activities:    Quality of Life:      PROMIS-29:    Physical Function:    Anxiety:    Depression:     Fatigue:     Sleep Disturbance:     Satisfaction with Social Role:     Pain Interference:     Pain Intensity:  6/10    Gait Analysis: with RW: antalgic gait with decreased stance time on (R) LE, decreased (B) step length, decreased chantal, decreased (R) TKE in stance    CMS Impairment/Limitation/Restriction for KOOS, Functional, Daily Living    Therapist reviewed KOOS scores for Sly Villarreal on 2019.   KOOS documents entered into [employer] - see Media section.    Limitation Score: 69%  Category: Mobility    Current : CL = least 60% but < 80% impaired, limited or restricted  Goal: CK = at least 40% but < 60% impaired, limited or restricted        Limitation for FOTO Knee Survey  Limitation Score: 76%         TREATMENT     Treatment Time In: 2:35 pm   Treatment Time Out: 2:55pm   Total Treatment time separate from Evaluation: 20 minutes    Sly received therapeutic exercises to develop strength, ROM and flexibility for 20  "minutes including:    Quad sets 5" x20   SLR 2x10   Seated marches 2x10  LAQ 2x10   Seated hamstring stretch 2' foot prop on chair      Home Exercises and Patient Education Provided    Education provided: pt and pt's daughter verbalized understanding of all education provided  - HEP  - Pt was educated on icing and elevating (R) knee  - Instructed to pt to wear JOHN hose at all times until instructed by Dr. Bro that he no longer needs to wear them  - Instructed pt that he could shower but not to take a bath or soak (R) LE    Written Home Exercises Provided: yes.  Exercises were reviewed and Sly was able to demonstrate them prior to the end of the session.  Sly demonstrated good  understanding of the education provided.     See EMR under Media for exercises provided 1/29/2019.    Assessment     Sly is a 79 y.o. male referred to outpatient Physical Therapy with a medical diagnosis of primary osteoarthritis of right knee. Pt presents to PT with pain, decreased right knee ROM, decreased strength, poor posture, impaired balance and gait, and functional deficits with walking. Pt would benefit from skilled PT consisting of manual therapy including STM/MFR right  knee, patellar mobs, knee flex/ext mobs/stretching; therapeutic exercise including LE strengthening/stretching, postural education, balance and gait training, and modalities prn to address limitations and increase functional mobility.      Pt prognosis is Good.   Pt will benefit from skilled outpatient Physical Therapy to address the deficits stated above and in the chart below, provide pt/family education, and to maximize pt's level of independence.     Plan of care discussed with patient: Yes  Pt's spiritual, cultural and educational needs considered and patient is agreeable to the plan of care and goals as stated below:     Anticipated Barriers for therapy: language barrier, comorbidities     Medical Necessity is demonstrated by the " following  History  Co-morbidities and personal factors that may impact the plan of care Co-morbidities:   diabetes    Personal Factors:   no deficits     moderate   Examination  Body Structures and Functions, activity limitations and participation restrictions that may impact the plan of care Body Regions:   lower extremities    Body Systems:    ROM  strength  gait  transfers    Participation Restrictions:   None mentioned     Activity limitations:   Learning and applying knowledge  no deficits    General Tasks and Commands  no deficits    Communication  no deficits    Mobility  walking    Self care  no deficits    Domestic Life  doing house work (cleaning house, washing dishes, laundry)    Interactions/Relationships  no deficits    Life Areas  no deficits    Community and Social Life  no deficits         high   Clinical Presentation stable and uncomplicated low   Decision Making/ Complexity Score: low     Goals:  Short Term Goals:    1. Pt will be independent with HEP  2. Pt will improve (R) LE strength to at least 75% of (L) LE strength as measured via MicroFet handheld dynamometer in order to improve functional mobility  3. Pt will improve (R) knee AROM to at least 5-110 in order to improve gait    Long Term Goals:  1. Pt will be independent with updated HEP  2. Pt will improve (R) LE strength to at least 90% of (L) LE strength as measured via MicroFet handheld dynamometer in order to improve functional mobility  3. Pt will improve (R) knee AROM to at least 2-115 degrees in order to improve gait and ability to perform ADLs  4. Pt will improve FOTO knee survey score to </= 46% limited in order to demo improved functional mobility  5. Pt will improve score on Function,Daily Living section of KOOS to at least 31/68   ( 45% limited) in order to demo improved functional mobility  6. Pt will perform TUG in < 10 seconds without AD in order to demo improved gait speed  7. Pt will perform at least 10 sit to stands  without UE support on 30 second sit to stand test in order to demo improved ability to perform transfers        Plan     Plan of care Certification: 1/29/2019 to 3/15/19.    Outpatient Physical Therapy 4 times weekly for 6 weeks to include the following interventions: Gait Training, Manual Therapy, Moist Heat/ Ice, Neuromuscular Re-ed, Orthotic Management and Training, Patient Education, Therapeutic Activites and Therapeutic Exercise, ASTYM, FDN,and modalities prn.     Esperanza Castañeda, PT, DPT

## 2019-01-29 NOTE — PATIENT INSTRUCTIONS
Discharge Instructions for Total Knee Replacement  You have undergone knee replacement surgery. The knee joint forms where the thighbone, shinbone, and kneecap meet. The knee joint is supported by muscles and ligaments, and is lined with a cushioning called cartilage. Over time, cartilage wears away. This can make the knee feel stiff and painful. Your doctor replaced your painful joint with an artificial joint to relieve pain and restore movement. Here are some instructions to follow once at home.  Home care  · When your doctor says it's OK to shower, carefully wash your incision with soap and water. Rinse the incision well. Then gently pat it dry. Dont rub the incision, or apply creams or lotions. Sit on a shower stool or chair when you shower to keep from falling.  · Take pain medicine as directed by your doctor.  Sitting and sleeping  · Sit in chairs with arms. The arms make it easier for you to stand up or sit down.  · Dont sit for more than 30 to 45 minutes at one time.  · Nap if you are tired, but dont stay in bed all day.  · Sleep with a pillow under your ankle, not your knee. Be sure to change the position of your leg during the night.  Moving safely  · The key to successful recovery is movement with walking and exercising your knee as directed by your doctor. You should be able to start moving your leg shortly after surgery as directed by your doctor.    · Walk up and down stairs with support. Try one step at a time. Use the railing if possible.  · Dont drive until your doctor says its OK. Most people can start driving about 6 weeks after surgery. Dont drive while you are taking opioid pain medication.  Other precautions  · Avoid soaking your knee in water (no hot tubs, bathtubs, swimming pools) until your doctor says its OK.  · Wear the support stockings you were given in the hospital, as instructed by your doctor. You may wear these stockings for 4 to 6 weeks after surgery. If needed, you can  place a bandage over the incision to prevent irritation from clothing or support stockings.  · Arrange your household to keep the items you need handy. Keep everything else out of the way. Remove items that may cause you to fall, such as throw rugs and electrical cords.  · Use nonslip bath mats, grab bars, an elevated toilet seat, and a shower chair in your bathroom.  · Until your balance, flexibility, and strength improve, use a cane, crutches, a walker, handrails, or someone to help you.  · Keep your hands free by using a backpack, miki pack, apron, or pockets to carry things.  · Prevent infection. Ask your doctor for instructions if you havent already received them. Any infection will need to be treated immediately. Call your doctor right away if you think you might have an infection.  · Tell your dentist that you have an artificial joint and take antibiotics as prescribed before any dental work.  · Tell all your healthcare providers about your artificial joint before any medical procedure.  · Maintain a healthy weight. Get help to lose any extra pounds. Added body weight puts stress on the knee.  · Take any medicine you may have been given after surgery. This may include blood-thinning medicine to prevent blood clots or antibiotics to prevent infection.  Follow-up care  Follow up with your healthcare provider, or as advised. You will need to have your staples removed 2 to 3 weeks after surgery.     When to call your healthcare provider  Call 911 right away if you have:  · Chest pain  · Shortness of breath  · Any pain or tenderness in your calf  Otherwise, call your healthcare provider right away if you have:  · Fever of 100.4 °F (38 °C) or higher, or as advised  · Shaking chills  · Stiffness, or inability to move the knee  · Increased swelling in your leg  · Increased redness, tenderness, or swelling in or around the knee incision  · Drainage from the knee incision  · Increased knee pain   Date Last Reviewed:  7/1/2016  © 5082-6927 [employer], [employer]. [address] [place] [address], [place] [address]. All rights reserved. This information is not intended as a substitute for professional medical care. Always follow your healthcare professional's instructions.

## 2019-01-29 NOTE — ANESTHESIA POSTPROCEDURE EVALUATION
"Anesthesia Post Evaluation    Patient: Sly Villarreal    Procedure(s) Performed: Procedure(s) (LRB):  ARTHROPLASTY, KNEE, SIGHT ASSISTED (Right)    Final Anesthesia Type: spinal  Patient location during evaluation: PACU  Patient participation: Yes- Able to Participate  Level of consciousness: awake and alert, oriented and awake  Post-procedure vital signs: reviewed and stable  Pain management: adequate  Airway patency: patent  PONV status at discharge: No PONV  Anesthetic complications: no      Cardiovascular status: blood pressure returned to baseline  Respiratory status: unassisted and room air  Hydration status: euvolemic  Follow-up not needed.        Visit Vitals  BP (!) 160/89   Pulse 88   Temp 36.6 °C (97.8 °F) (Skin)   Resp 17   Ht 5' 8" (1.727 m)   Wt 78.9 kg (174 lb)   SpO2 100%   BMI 26.46 kg/m²       Pain/Florencoi Score: Pain Rating Prior to Med Admin: 5 (1/28/2019  1:30 PM)  Pain Rating Post Med Admin: 3 (1/28/2019  2:05 PM)  Florencio Score: 10 (1/28/2019  1:30 PM)        "

## 2019-01-30 ENCOUNTER — CLINICAL SUPPORT (OUTPATIENT)
Dept: REHABILITATION | Facility: HOSPITAL | Age: 79
End: 2019-01-30
Payer: MEDICARE

## 2019-01-30 DIAGNOSIS — M25.561 CHRONIC PAIN OF RIGHT KNEE: ICD-10-CM

## 2019-01-30 DIAGNOSIS — G89.29 CHRONIC PAIN OF RIGHT KNEE: ICD-10-CM

## 2019-01-30 DIAGNOSIS — R29.898 DECREASED STRENGTH OF LOWER EXTREMITY: ICD-10-CM

## 2019-01-30 DIAGNOSIS — M25.661 DECREASED RANGE OF MOTION OF RIGHT KNEE: ICD-10-CM

## 2019-01-30 DIAGNOSIS — R26.89 IMPAIRED GAIT AND MOBILITY: ICD-10-CM

## 2019-01-30 PROCEDURE — 97110 THERAPEUTIC EXERCISES: CPT | Mod: PN

## 2019-01-30 PROCEDURE — 97140 MANUAL THERAPY 1/> REGIONS: CPT | Mod: PN

## 2019-01-30 NOTE — PROGRESS NOTES
"  Physical Therapy Daily Treatment Note     Name: Sly Villarreal  Clinic Number: [id number]    Therapy Diagnosis:   Encounter Diagnoses   Name Primary?    Chronic pain of right knee     Decreased range of motion of right knee     Decreased strength of lower extremity     Impaired gait and mobility      Physician: Marcus Bro MD    Visit Date: 1/30/2019    Physician Orders: PT Eval and Treat   Medical Diagnosis from Referral: primary osteoarthritis of right knee  Evaluation Date: 1/29/2019  Authorization Period Expiration: 12/31/19  Plan of Care Expiration: 1/29/2019 to 3/15/19  Visit # / Visits authorized: 2/ 50  FOTO: 2/10  Visit amount: 88.10  Total amount: 201.30    Time In: 8:15 am   Time Out: 8:57 am   Total Billable Time: 42 minutes    Precautions: Standard and Diabetes    Subjective     Pt's daughter present and assisted with translation    Pt reports: that he is experiencing more pain in (R) knee since last night.   He was compliant with home exercise program.  Response to previous treatment: last session was initial evaluation.   Functional change: none    Pain: 9/10  Location: right knee      Objective     Sly received the following manual therapy techniques: Soft tissue Mobilization were applied to the: (R) knee for 15 minutes, including:  - retrograde edema reduction to (R) LE  - STM to (R) quad, hamstring, ITB    Sly received therapeutic exercises to develop strength, ROM and flexibility for 27 minutes including:     Quad sets 5" x20   SAQ 2x10   SLR 2x10   SL hip abduction 2x10   Seated marches 2x10  LAQ 2x10   Seated hamstring stretch 2' foot prop on chair  Seated gastroc stretch 3x30" w/strap    Home Exercises Provided and Patient Education Provided     Education provided:   - Continue with HEP     Written Home Exercises Provided: Patient instructed to cont prior HEP.  Exercises were reviewed and Sly was able to demonstrate them prior to the end of the session.  Sly " demonstrated good  understanding of the education provided.     See EMR under Media for exercises provided prior visit.    Assessment     Pt tolerated therapy session without any adverse reactions. Pt reported decreased pain in (R) knee at the end of therapy session.   Sly is progressing well towards his goals.   Pt prognosis is Good.     Pt will continue to benefit from skilled outpatient physical therapy to address the deficits listed in the problem list box on initial evaluation, provide pt/family education and to maximize pt's level of independence in the home and community environment.     Pt's spiritual, cultural and educational needs considered and pt agreeable to plan of care and goals.    Anticipated barriers to physical therapy: language barrier, comorbidities     Goals:     Short Term Goals:     1. Pt will be independent with HEP  2. Pt will improve (R) LE strength to at least 75% of (L) LE strength as measured via MicroFet handheld dynamometer in order to improve functional mobility  3. Pt will improve (R) knee AROM to at least 5-110 in order to improve gait     Long Term Goals:  1. Pt will be independent with updated HEP  2. Pt will improve (R) LE strength to at least 90% of (L) LE strength as measured via MicroFet handheld dynamometer in order to improve functional mobility  3. Pt will improve (R) knee AROM to at least 2-115 degrees in order to improve gait and ability to perform ADLs  4. Pt will improve FOTO knee survey score to </= 46% limited in order to demo improved functional mobility  5. Pt will improve score on Function,Daily Living section of KOOS to at least 31/68   ( 45% limited) in order to demo improved functional mobility  6. Pt will perform TUG in < 10 seconds without AD in order to demo improved gait speed  7. Pt will perform at least 10 sit to stands without UE support on 30 second sit to stand test in order to demo improved ability to perform transfers    Plan     Continue per  POC, progress as tolerated    Esperanza Castañeda, PT

## 2019-02-01 ENCOUNTER — CLINICAL SUPPORT (OUTPATIENT)
Dept: REHABILITATION | Facility: HOSPITAL | Age: 79
End: 2019-02-01
Payer: MEDICARE

## 2019-02-01 DIAGNOSIS — M25.661 DECREASED RANGE OF MOTION OF RIGHT KNEE: ICD-10-CM

## 2019-02-01 DIAGNOSIS — R26.89 IMPAIRED GAIT AND MOBILITY: ICD-10-CM

## 2019-02-01 DIAGNOSIS — M25.561 CHRONIC PAIN OF RIGHT KNEE: ICD-10-CM

## 2019-02-01 DIAGNOSIS — G89.29 CHRONIC PAIN OF RIGHT KNEE: ICD-10-CM

## 2019-02-01 DIAGNOSIS — R29.898 DECREASED STRENGTH OF LOWER EXTREMITY: ICD-10-CM

## 2019-02-01 PROCEDURE — 97110 THERAPEUTIC EXERCISES: CPT | Mod: PN

## 2019-02-01 PROCEDURE — 97140 MANUAL THERAPY 1/> REGIONS: CPT | Mod: PN

## 2019-02-01 NOTE — PROGRESS NOTES
"  Physical Therapy Daily Treatment Note     Name: Sly Villarreal  Clinic Number: [id number]    Therapy Diagnosis:   Encounter Diagnoses   Name Primary?    Chronic pain of right knee     Decreased range of motion of right knee     Decreased strength of lower extremity     Impaired gait and mobility      Physician: Marcus Bro MD    Visit Date: 2/1/2019    Physician Orders: PT Eval and Treat   Medical Diagnosis from Referral: primary osteoarthritis of right knee  Evaluation Date: 1/29/2019  Authorization Period Expiration: 12/31/19  Plan of Care Expiration: 1/29/2019 to 3/15/19  Visit # / Visits authorized: 3/ 50  FOTO: 3/10  Visit amount: 57.78  Total amount: 259.08    Time In: 11:00 am  Time Out: 12:10 pm   Total Billable Time: 35 minutes    Precautions: Standard and Diabetes    Subjective     Pt reports: that he has been icing and elevating in (R) knee.   He was compliant with home exercise program.  Response to previous treatment: no adverse effects  Functional change: none    Pain: 9/10  Location: right knee      Objective   Pt's daughter assisted with translation during therapy session    Sly received the following manual therapy techniques: Soft tissue Mobilization were applied to the: (R) knee for 20 minutes, including:  - retrograde edema reduction to (R) LE  - STM to (R) quad, hamstring, ITB  - removal of banadage    Sly received therapeutic exercises to develop strength, ROM and flexibility for 15' 1:1 with PT and x 25' supervised including:     Quad sets 5" x20   SAQ 2x10   SLR 2x10   SL hip abduction 2x10   Seated marches 2x10  LAQ 2x10   Seated hamstring stretch 2' foot prop on chair  Seated gastroc stretch 3x30" w/strap  Stair training     CP to (R) knee x 10' at end of session.     Home Exercises Provided and Patient Education Provided     Education provided:   - Continue with HEP - pt demo/verbalized understanding  - Pt was educated on and practiced ascending/descending stairs in clinic - " pt demo/verbalized understanding     Written Home Exercises Provided: Patient instructed to cont prior HEP.  Exercises were reviewed and Sly was able to demonstrate them prior to the end of the session.  Sly demonstrated good  understanding of the education provided.     See EMR under Media for exercises provided 1/29/19.    Assessment     Bandage removed from incision today and no sign of infection noted along incision on (R) knee. Pt was educated on and practiced ascending/descending stairs in clinic with safe/steady technique. Pt tolerated therapy session without any adverse reactions.   Sly is progressing well towards his goals.   Pt prognosis is Good.     Pt will continue to benefit from skilled outpatient physical therapy to address the deficits listed in the problem list box on initial evaluation, provide pt/family education and to maximize pt's level of independence in the home and community environment.     Pt's spiritual, cultural and educational needs considered and pt agreeable to plan of care and goals.    Anticipated barriers to physical therapy: language barrier, comorbidties    Goals:     Short Term Goals:     1. Pt will be independent with HEP  2. Pt will improve (R) LE strength to at least 75% of (L) LE strength as measured via MicroFet handheld dynamometer in order to improve functional mobility  3. Pt will improve (R) knee AROM to at least 5-110 in order to improve gait     Long Term Goals:  1. Pt will be independent with updated HEP  2. Pt will improve (R) LE strength to at least 90% of (L) LE strength as measured via MicroFet handheld dynamometer in order to improve functional mobility  3. Pt will improve (R) knee AROM to at least 2-115 degrees in order to improve gait and ability to perform ADLs  4. Pt will improve FOTO knee survey score to </= 46% limited in order to demo improved functional mobility  5. Pt will improve score on Function,Daily Living section of KOOS to at least  31/68   ( 45% limited) in order to demo improved functional mobility  6. Pt will perform TUG in < 10 seconds without AD in order to demo improved gait speed  7. Pt will perform at least 10 sit to stands without UE support on 30 second sit to stand test in order to demo improved ability to perform transfers    Plan     Continue per POC, progress as tolerated    Esperanza Castañeda, PT

## 2019-02-04 ENCOUNTER — CLINICAL SUPPORT (OUTPATIENT)
Dept: REHABILITATION | Facility: HOSPITAL | Age: 79
End: 2019-02-04
Payer: MEDICARE

## 2019-02-04 DIAGNOSIS — M25.661 DECREASED RANGE OF MOTION OF RIGHT KNEE: ICD-10-CM

## 2019-02-04 DIAGNOSIS — R29.898 DECREASED STRENGTH OF LOWER EXTREMITY: ICD-10-CM

## 2019-02-04 DIAGNOSIS — M25.561 CHRONIC PAIN OF RIGHT KNEE: ICD-10-CM

## 2019-02-04 DIAGNOSIS — G89.29 CHRONIC PAIN OF RIGHT KNEE: ICD-10-CM

## 2019-02-04 DIAGNOSIS — R26.89 IMPAIRED GAIT AND MOBILITY: ICD-10-CM

## 2019-02-04 PROCEDURE — 97110 THERAPEUTIC EXERCISES: CPT | Mod: PN

## 2019-02-04 PROCEDURE — 97140 MANUAL THERAPY 1/> REGIONS: CPT | Mod: PN

## 2019-02-04 NOTE — PROGRESS NOTES
"  Physical Therapy Daily Treatment Note     Name: Sly Villarreal  Clinic Number: [id number]    Therapy Diagnosis:   Encounter Diagnoses   Name Primary?    Chronic pain of right knee     Decreased range of motion of right knee     Decreased strength of lower extremity     Impaired gait and mobility      Physician: Marcus Bro MD    Visit Date: 2/4/2019    Physician Orders: PT Eval and Treat   Medical Diagnosis from Referral: primary osteoarthritis of right knee  Evaluation Date: 1/29/2019  Authorization Period Expiration: 12/31/19  Plan of Care Expiration: 1/29/2019 to 3/15/19  Visit # / Visits authorized: 3/ 50  FOTO: 3/10  Visit amount: 118.42  Total amount: 377.50     Time In: 9:00 am   Time Out: 10:21 am  Total Billable Time: 66 minutes    Precautions: Standard and Diabetes    Subjective     Pt reports: that he was able to go up stairs in his home without any problem.   He was compliant with home exercise program.  Response to previous treatment: no adverse effects.   Functional change: none    Pain: 9/10  Location: right knee      Objective     Pt's daughter assisted with translation during therapy session    Sly received the following manual therapy techniques: Soft tissue Mobilization were applied to the: (R) knee for 10 minutes, including:  - retrograde edema reduction to (R) LE  - STM to (R) quad, hamstring, ITB, proximal gastroc  - patella mobs in all directions    Objective measurements were taken and Sly received therapeutic exercises to develop strength, ROM and flexibility for 56' including: log below. Bike x5' supervised.      Quad sets 5" x20   SAQ 2x15  SLR 2x15   Heel slides 10"x10 w/straps   SL hip abduction 2x15  Seated marches 2x10 - Not performed today  LAQ 2x15  Seated hamstring stretch 2' foot prop on chair  Seated gastroc stretch 3x30" w/strap  Bike x 5'.     AROM:   right knee: 7- 90 degrees     PROM:   Right knee:  4- 95 degrees      L/E Strength w/ MicroFET Muscle Letty " Dynamometer Right Left Pain/Dysfunction with Movement   (approx 4 sec hold w/ max contraction)    != pain   Hip Flexion 8.0 kg ! 12.0 kg      Hip Abduction 6.0 kg ! 13.6 kg      Quadriceps 3.6 kg ! 10.1 kg      Hamstrings 6.8 kg ! 11.5 kg         TUG:  out of time     30 second sit-to-stand test (without U/E support): out of time      CP to (R) knee x 10' at end of session.     Home Exercises Provided and Patient Education Provided     Education provided:   - Pt was educated on and given handout on heel slides. Pt demo/verbalized understanding.     Written Home Exercises Provided: yes.  Exercises were reviewed and Heart was able to demonstrate them prior to the end of the session.  Heart demonstrated good  understanding of the education provided.     See EMR under Media for exercises provided 2/4/2019.    Assessment     Decreased strength measurements noted today most likely due to pt report of experiencing 9/10 pain in (R) knee today. Pt performed all there with slow deliberate movements requiring increased time to complete. Pt tolerated therapy session without any adverse reactions.   Sly is progressing well towards his goals.   Pt prognosis is Good.     Pt will continue to benefit from skilled outpatient physical therapy to address the deficits listed in the problem list box on initial evaluation, provide pt/family education and to maximize pt's level of independence in the home and community environment.     Pt's spiritual, cultural and educational needs considered and pt agreeable to plan of care and goals.    Anticipated barriers to physical therapy: language barrier, comorbidities     Goals:     Short Term Goals:     1. Pt will be independent with HEP  2. Pt will improve (R) LE strength to at least 75% of (L) LE strength as measured via MicroFet handheld dynamometer in order to improve functional mobility  3. Pt will improve (R) knee AROM to at least 5-110 in order to improve gait     Long Term  Goals:  1. Pt will be independent with updated HEP  2. Pt will improve (R) LE strength to at least 90% of (L) LE strength as measured via MicroFet handheld dynamometer in order to improve functional mobility  3. Pt will improve (R) knee AROM to at least 2-115 degrees in order to improve gait and ability to perform ADLs  4. Pt will improve FOTO knee survey score to </= 46% limited in order to demo improved functional mobility  5. Pt will improve score on Function,Daily Living section of KOOS to at least 31/68   ( 45% limited) in order to demo improved functional mobility  6. Pt will perform TUG in < 10 seconds without AD in order to demo improved gait speed  7. Pt will perform at least 10 sit to stands without UE support on 30 second sit to stand test in order to demo improved ability to perform transfers      Plan     Continue per POC, progress as tolerated    Esperanza Castañeda, PT

## 2019-02-06 ENCOUNTER — CLINICAL SUPPORT (OUTPATIENT)
Dept: REHABILITATION | Facility: HOSPITAL | Age: 79
End: 2019-02-06
Payer: MEDICARE

## 2019-02-06 DIAGNOSIS — M25.661 DECREASED RANGE OF MOTION OF RIGHT KNEE: ICD-10-CM

## 2019-02-06 DIAGNOSIS — G89.29 CHRONIC PAIN OF RIGHT KNEE: ICD-10-CM

## 2019-02-06 DIAGNOSIS — R26.89 IMPAIRED GAIT AND MOBILITY: ICD-10-CM

## 2019-02-06 DIAGNOSIS — R29.898 DECREASED STRENGTH OF LOWER EXTREMITY: ICD-10-CM

## 2019-02-06 DIAGNOSIS — M25.561 CHRONIC PAIN OF RIGHT KNEE: ICD-10-CM

## 2019-02-06 PROCEDURE — 97140 MANUAL THERAPY 1/> REGIONS: CPT | Mod: PN

## 2019-02-06 PROCEDURE — 97110 THERAPEUTIC EXERCISES: CPT | Mod: PN

## 2019-02-06 NOTE — PROGRESS NOTES
"  Physical Therapy Daily Treatment Note     Name: Sly Villarreal  Clinic Number: [id number]    Therapy Diagnosis:   Encounter Diagnoses   Name Primary?    Chronic pain of right knee     Decreased range of motion of right knee     Decreased strength of lower extremity     Impaired gait and mobility      Physician: Marcus Bro MD    Visit Date: 2/6/2019    Physician Orders: PT Eval and Treat   Medical Diagnosis from Referral: primary osteoarthritis of right knee  Evaluation Date: 1/29/2019  Authorization Period Expiration: 12/31/19  Plan of Care Expiration: 1/29/2019 to 3/15/19  Visit # / Visits authorized: 5/ 50  FOTO: 5/10 NEXT  Visit amount: 57.78  Total amount: 435.28    Time In: 9:00 am   Time Out: 10:05 am   Total Billable Time: 30 minutes    Precautions: Standard and Diabetes    Subjective     Pt reports: that his (R) knee is feeling better today.   He was compliant with home exercise program.  Response to previous treatment: no adverse effects  Functional change: none    Pain: 6/10  Location: right knee      Objective     Pt's daughter assisted with translation during therapy session    Sly received the following manual therapy techniques: Soft tissue Mobilization were applied to the: (R) knee for 10 minutes, including:  - retrograde edema reduction to (R) LE  - STM to (R) quad, hamstring, ITB, proximal gastroc  - patella mobs in all directions    Objective measurements were taken and Sly received therapeutic exercises to develop strength, ROM and flexibility for 20' 1:1 with PT and x 25' supervised including: log below.       Quad sets 5" x20   SAQ 2x15  SLR 2x15   Heel slides 10"x10 w/straps   SL hip abduction 2x15  LAQ 2x15  Seated hamstring stretch 2' foot prop on chair  Seated gastroc stretch 3x30" w/strap  Leg press 3.0 2x10  Bike x 5'.     CP to (R) knee at end of session x 10'.     Home Exercises Provided and Patient Education Provided     Education provided:   - Continue with HEP "     Written Home Exercises Provided: Patient instructed to cont prior HEP.  Exercises were reviewed and Sly was able to demonstrate them prior to the end of the session.  Sly demonstrated good  understanding of the education provided.     See EMR under Media for exercises provided initial evaluation and 2/4/19.    Assessment     Pt presented with decreased reports of pain today. Pt was able to tolerate addition leg press therex today. Pt tolerated therapy session without any adverse reactions.   Sly is progressing well towards his goals.   Pt prognosis is Good.     Pt will continue to benefit from skilled outpatient physical therapy to address the deficits listed in the problem list box on initial evaluation, provide pt/family education and to maximize pt's level of independence in the home and community environment.     Pt's spiritual, cultural and educational needs considered and pt agreeable to plan of care and goals.    Anticipated barriers to physical therapy: language barrier, comorbidities     Goals:     Short Term Goals:     1. Pt will be independent with HEP  2. Pt will improve (R) LE strength to at least 75% of (L) LE strength as measured via MicroFet handheld dynamometer in order to improve functional mobility  3. Pt will improve (R) knee AROM to at least 5-110 in order to improve gait     Long Term Goals:  1. Pt will be independent with updated HEP  2. Pt will improve (R) LE strength to at least 90% of (L) LE strength as measured via MicroFet handheld dynamometer in order to improve functional mobility  3. Pt will improve (R) knee AROM to at least 2-115 degrees in order to improve gait and ability to perform ADLs  4. Pt will improve FOTO knee survey score to </= 46% limited in order to demo improved functional mobility  5. Pt will improve score on Function,Daily Living section of KOOS to at least 31/68   ( 45% limited) in order to demo improved functional mobility  6. Pt will perform TUG  in < 10 seconds without AD in order to demo improved gait speed  7. Pt will perform at least 10 sit to stands without UE support on 30 second sit to stand test in order to demo improved ability to perform transfers        Plan     Continue per POC, progress as tolerated    Esperanza Castañeda, PT

## 2019-02-07 ENCOUNTER — CLINICAL SUPPORT (OUTPATIENT)
Dept: REHABILITATION | Facility: HOSPITAL | Age: 79
End: 2019-02-07
Payer: MEDICARE

## 2019-02-07 DIAGNOSIS — G89.29 CHRONIC PAIN OF RIGHT KNEE: ICD-10-CM

## 2019-02-07 DIAGNOSIS — M25.561 CHRONIC PAIN OF RIGHT KNEE: ICD-10-CM

## 2019-02-07 DIAGNOSIS — R29.898 DECREASED STRENGTH OF LOWER EXTREMITY: ICD-10-CM

## 2019-02-07 DIAGNOSIS — R26.89 IMPAIRED GAIT AND MOBILITY: ICD-10-CM

## 2019-02-07 DIAGNOSIS — M25.661 DECREASED RANGE OF MOTION OF RIGHT KNEE: ICD-10-CM

## 2019-02-07 PROCEDURE — 97110 THERAPEUTIC EXERCISES: CPT | Mod: PN

## 2019-02-07 PROCEDURE — 97140 MANUAL THERAPY 1/> REGIONS: CPT | Mod: PN

## 2019-02-07 NOTE — PROGRESS NOTES
"  Physical Therapy Daily Treatment Note     Name: Sly Villarreal  Clinic Number: [id number]    Therapy Diagnosis:   Encounter Diagnoses   Name Primary?    Chronic pain of right knee     Decreased range of motion of right knee     Decreased strength of lower extremity     Impaired gait and mobility      Physician: Marcus Bro MD    Visit Date: 2/7/2019    Physician Orders: PT Eval and Treat   Medical Diagnosis from Referral: primary osteoarthritis of right knee  Evaluation Date: 1/29/2019  Authorization Period Expiration: 12/31/19  Plan of Care Expiration: 1/29/2019 to 3/15/19  Visit # / Visits authorized: 6/ 50  FOTO: 6/10 NEXT  Visit amount: 57.78  Total amount: 493.06    Time In: 9:00 am   Time Out: 10:08 am   Total Billable Time: 28 minutes    Precautions: Standard and Diabetes    Subjective     Pt reports: decreased (R) knee pain.  He was compliant with home exercise program.  Response to previous treatment: no adverse effects  Functional change: none    Pain: 5/10  Location: right knee      Objective     Pt's daughter assisted with translation during therapy session    Sly received the following manual therapy techniques: Soft tissue Mobilization were applied to the: (R) knee for 12 minutes, including:  - retrograde edema reduction to (R) LE  - STM to (R) quad, hamstring, ITB, proximal gastroc  - patella mobs in all directions    Sly received therapeutic exercises to develop strength, ROM and flexibility for 30' supervised and x 16' 1:1 with PT  including: log below.       Quad sets 5" x20   SAQ 2x15 - Not performed today  SLR 2x15   Heel slides 10"x10 w/straps   SL hip abduction 2x15  LAQ 2x15  Seated hamstring stretch 2' foot prop on chair  Seated gastroc stretch 3x30" w/strap  Leg press 3.0 2x10  TKE 2x10 3" hold  Steamboats 2x10   Bike x 5'.     CP to (R) knee at end of session x 10'.     Home Exercises Provided and Patient Education Provided     Education provided: pt and pt's daughter " verbalized understanding of all education provided  - continue with HEP   - instructed pt and his daughter to bring a SPC next session to begin gait training with SPC    Written Home Exercises Provided: Patient instructed to cont prior HEP.  Exercises were reviewed and Sly was able to demonstrate them prior to the end of the session.  Sly demonstrated good  understanding of the education provided.     See EMR under Media for exercises provided initial evaluation.    Assessment     Pt was able to tolerate progression of standing therex today. Pt was able to make full revolutions on bike today. Pt is making steady progress with his PT treatments. Pt tolerated therapy session without any adverse reactions.   Sly is progressing well towards his goals.   Pt prognosis is Good.     Pt will continue to benefit from skilled outpatient physical therapy to address the deficits listed in the problem list box on initial evaluation, provide pt/family education and to maximize pt's level of independence in the home and community environment.     Pt's spiritual, cultural and educational needs considered and pt agreeable to plan of care and goals.    Anticipated barriers to physical therapy: comorbidities, language barrier    Goals:     Short Term Goals:     1. Pt will be independent with HEP - MET  2. Pt will improve (R) LE strength to at least 75% of (L) LE strength as measured via MicroFet handheld dynamometer in order to improve functional mobility - progressing not met  3. Pt will improve (R) knee AROM to at least 5-110 in order to improve gait - progressing not met     Long Term Goals:  1. Pt will be independent with updated HEP- progressing not met  2. Pt will improve (R) LE strength to at least 90% of (L) LE strength as measured via MicroFet handheld dynamometer in order to improve functional mobility - progressing not met  3. Pt will improve (R) knee AROM to at least 2-115 degrees in order to improve gait and  ability to perform ADLs - progressing not met  4. Pt will improve FOTO knee survey score to </= 46% limited in order to demo improved functional mobility - progressing not met  5. Pt will improve score on Function,Daily Living section of KOOS to at least 31/68  ( 45% limited) in order to demo improved functional mobility - progressing not met  6. Pt will perform TUG in < 10 seconds without AD in order to demo improved gait speed - progressing not met  7. Pt will perform at least 10 sit to stands without UE support on 30 second sit to stand test in order to demo improved ability to perform transfers - progressing not met    Plan     Continue per POC,progress as tolerated; Gait training with SPC next     Esperanza Castañeda, PT

## 2019-02-11 ENCOUNTER — HOSPITAL ENCOUNTER (OUTPATIENT)
Dept: RADIOLOGY | Facility: HOSPITAL | Age: 79
Discharge: HOME | End: 2019-02-11
Attending: INTERNAL MEDICINE
Payer: MEDICARE

## 2019-02-11 DIAGNOSIS — R05.9 COUGH: Primary | ICD-10-CM

## 2019-02-11 DIAGNOSIS — R05.9 COUGH: ICD-10-CM

## 2019-02-11 PROCEDURE — 71046 X-RAY EXAM CHEST 2 VIEWS: CPT | Mod: 26,,, | Performed by: RADIOLOGY

## 2019-02-11 PROCEDURE — 71046 XR CHEST PA AND LATERAL: ICD-10-PCS | Mod: 26,,, | Performed by: RADIOLOGY

## 2019-02-11 PROCEDURE — 71046 X-RAY EXAM CHEST 2 VIEWS: CPT | Mod: TC,FY

## 2019-02-13 ENCOUNTER — CLINICAL SUPPORT (OUTPATIENT)
Dept: REHABILITATION | Facility: HOSPITAL | Age: 79
End: 2019-02-13
Payer: MEDICARE

## 2019-02-13 DIAGNOSIS — R26.89 IMPAIRED GAIT AND MOBILITY: ICD-10-CM

## 2019-02-13 DIAGNOSIS — G89.29 CHRONIC PAIN OF RIGHT KNEE: ICD-10-CM

## 2019-02-13 DIAGNOSIS — M25.561 CHRONIC PAIN OF RIGHT KNEE: ICD-10-CM

## 2019-02-13 DIAGNOSIS — R29.898 DECREASED STRENGTH OF LOWER EXTREMITY: ICD-10-CM

## 2019-02-13 DIAGNOSIS — M25.661 DECREASED RANGE OF MOTION OF RIGHT KNEE: ICD-10-CM

## 2019-02-13 PROCEDURE — 97140 MANUAL THERAPY 1/> REGIONS: CPT | Mod: PN

## 2019-02-13 PROCEDURE — 97112 NEUROMUSCULAR REEDUCATION: CPT | Mod: PN

## 2019-02-13 PROCEDURE — 97110 THERAPEUTIC EXERCISES: CPT | Mod: PN

## 2019-02-13 PROCEDURE — 97116 GAIT TRAINING THERAPY: CPT | Mod: PN

## 2019-02-13 NOTE — PROGRESS NOTES
"  Physical Therapy Daily Treatment Note     Name: Sly Villarreal  Clinic Number: [id number]    Therapy Diagnosis:   Encounter Diagnoses   Name Primary?    Chronic pain of right knee     Decreased range of motion of right knee     Decreased strength of lower extremity     Impaired gait and mobility      Physician: Marcus Bro MD    Visit Date: 2/13/2019    Physician Orders: PT Eval and Treat   Medical Diagnosis from Referral: primary osteoarthritis of right knee  Evaluation Date: 1/29/2019  Authorization Period Expiration: 12/31/19  Plan of Care Expiration: 1/29/2019 to 3/15/19  Visit # / Visits authorized: 7/ 50  FOTO: 7/10 DONE today  Visit amount: 148.42  Total amount: 641.48    Time In: 9:00 am   Time Out: 10:20 am   Total Billable Time: 70 minutes (3 TE, 1gait, 1MT)    Precautions: Standard and Diabetes    Subjective     Pt reports: decreased (R) knee pain.  He was compliant with home exercise program.  Response to previous treatment: no adverse effects  Functional change: none    Pain: 5/10  Location: right knee      Objective     Pt's daughter assisted with translation during therapy session    Sly received the following manual therapy techniques: Soft tissue Mobilization were applied to the: (R) knee for 12 minutes, including:  - retrograde edema reduction to (R) LE  - STM to (R) quad, hamstring, ITB, proximal gastroc  - patella mobs in all directions    Sly received therapeutic exercises to develop strength, ROM and flexibility for 50' 1:1 with PT  including: log below.    Most Supine exercises not performed today (HEP performed at home)  Quad sets 5" x20   Heel slides 10"x10 w/straps   Seated hamstring stretch 5' foot prop on chair with 5# above and below knee  Seated gastroc stretch 3x30" w/strap  Leg press 4.0 2x10  TKE 2x10 3"   [place] not performed today  Bike x 5'.     AROM:   right knee: 8- 114 degrees     PROM:   Right knee:  6- 123 degrees      L/E Strength w/ MicroFET Muscle Letty " Dynamometer Right Left Pain/Dysfunction with Movement   (approx 4 sec hold w/ max contraction)     != pain   Hip Flexion 13.5 kg  14.0 kg      Hip Abduction 14.1 kg  22.6 kg      Quadriceps 11.7 kg  14.6 kg      Hamstrings 14.7 kg  13.5 kg         TU secs with RW     30 second sit-to-stand test (without U/E support): 7 with UE support no AD     Sly performed gait training x 8' with SPC with Nevaeh and cues and demo to coordinate 3 point pattern in the L hand.     CP to (R) knee at end of session x 10'.     Home Exercises Provided and Patient Education Provided     Education provided: pt and pt's daughter verbalized understanding of all education provided  - continue with HEP   - instructed pt and his daughter to bring a SPC next session to begin gait training with SPC    Written Home Exercises Provided: Patient instructed to cont prior HEP.  Exercises were reviewed and Sly was able to demonstrate them prior to the end of the session.  Sly demonstrated good  understanding of the education provided.     See EMR under Media for exercises provided initial evaluation.    Assessment     Pt flexion improved >20 degrees but extension still lacking extension actively and passively. Strength B LEs improved    Sly is progressing well towards his goals.   Pt prognosis is Good.     Pt will continue to benefit from skilled outpatient physical therapy to address the deficits listed in the problem list box on initial evaluation, provide pt/family education and to maximize pt's level of independence in the home and community environment.     Pt's spiritual, cultural and educational needs considered and pt agreeable to plan of care and goals.    Anticipated barriers to physical therapy: comorbidities, language barrier    Goals:     Short Term Goals:     1. Pt will be independent with HEP - MET  2. Pt will improve (R) LE strength to at least 75% of (L) LE strength as measured via [employer] handheld dynamometer  in order to improve functional mobility - progressing not met  3. Pt will improve (R) knee AROM to at least 5-110 in order to improve gait - progressing not met     Long Term Goals:  1. Pt will be independent with updated HEP- progressing not met  2. Pt will improve (R) LE strength to at least 90% of (L) LE strength as measured via MicroFet handheld dynamometer in order to improve functional mobility - progressing not met  3. Pt will improve (R) knee AROM to at least 2-115 degrees in order to improve gait and ability to perform ADLs - progressing not met  4. Pt will improve FOTO knee survey score to </= 46% limited in order to demo improved functional mobility - progressing not met  5. Pt will improve score on Function,Daily Living section of KOOS to at least 31/68  ( 45% limited) in order to demo improved functional mobility - progressing not met  6. Pt will perform TUG in < 10 seconds without AD in order to demo improved gait speed - progressing not met  7. Pt will perform at least 10 sit to stands without UE support on 30 second sit to stand test in order to demo improved ability to perform transfers - progressing not met    Plan     Continue per POC,progress as tolerated; Gait training with SPC and focus on      Krysten Crocker, PT

## 2019-02-15 ENCOUNTER — TELEPHONE (OUTPATIENT)
Dept: REHABILITATION | Facility: HOSPITAL | Age: 79
End: 2019-02-15

## 2019-02-20 ENCOUNTER — TELEPHONE (OUTPATIENT)
Dept: REHABILITATION | Facility: HOSPITAL | Age: 79
End: 2019-02-20

## 2019-02-22 ENCOUNTER — CLINICAL SUPPORT (OUTPATIENT)
Dept: REHABILITATION | Facility: HOSPITAL | Age: 79
End: 2019-02-22
Payer: MEDICARE

## 2019-02-22 DIAGNOSIS — M25.561 CHRONIC PAIN OF RIGHT KNEE: ICD-10-CM

## 2019-02-22 DIAGNOSIS — R26.89 IMPAIRED GAIT AND MOBILITY: ICD-10-CM

## 2019-02-22 DIAGNOSIS — G89.29 CHRONIC PAIN OF RIGHT KNEE: ICD-10-CM

## 2019-02-22 DIAGNOSIS — M25.661 DECREASED RANGE OF MOTION OF RIGHT KNEE: ICD-10-CM

## 2019-02-22 DIAGNOSIS — R29.898 DECREASED STRENGTH OF LOWER EXTREMITY: ICD-10-CM

## 2019-02-22 PROCEDURE — 97110 THERAPEUTIC EXERCISES: CPT | Mod: PN

## 2019-02-25 ENCOUNTER — CLINICAL SUPPORT (OUTPATIENT)
Dept: REHABILITATION | Facility: HOSPITAL | Age: 79
End: 2019-02-25
Payer: MEDICARE

## 2019-02-25 DIAGNOSIS — R29.898 DECREASED STRENGTH OF LOWER EXTREMITY: ICD-10-CM

## 2019-02-25 DIAGNOSIS — G89.29 CHRONIC PAIN OF RIGHT KNEE: ICD-10-CM

## 2019-02-25 DIAGNOSIS — R26.89 IMPAIRED GAIT AND MOBILITY: ICD-10-CM

## 2019-02-25 DIAGNOSIS — M25.661 DECREASED RANGE OF MOTION OF RIGHT KNEE: ICD-10-CM

## 2019-02-25 DIAGNOSIS — M25.561 CHRONIC PAIN OF RIGHT KNEE: ICD-10-CM

## 2019-02-25 PROCEDURE — 97110 THERAPEUTIC EXERCISES: CPT | Mod: PN

## 2019-02-26 ENCOUNTER — DOCUMENTATION ONLY (OUTPATIENT)
Dept: REHABILITATION | Facility: HOSPITAL | Age: 79
End: 2019-02-26

## 2019-02-26 NOTE — PROGRESS NOTES
Face to Face PTA Conference performed with PTAs regarding patient's current status, overall progress, and plan of care.     Esperanza Castañeda, PT    Gaviota Carreon, PTA

## 2019-02-27 ENCOUNTER — TELEPHONE (OUTPATIENT)
Dept: REHABILITATION | Facility: HOSPITAL | Age: 79
End: 2019-02-27

## 2019-02-28 ENCOUNTER — CLINICAL SUPPORT (OUTPATIENT)
Dept: REHABILITATION | Facility: HOSPITAL | Age: 79
End: 2019-02-28
Payer: MEDICARE

## 2019-02-28 DIAGNOSIS — G89.29 CHRONIC PAIN OF RIGHT KNEE: ICD-10-CM

## 2019-02-28 DIAGNOSIS — M25.661 DECREASED RANGE OF MOTION OF RIGHT KNEE: ICD-10-CM

## 2019-02-28 DIAGNOSIS — R29.898 DECREASED STRENGTH OF LOWER EXTREMITY: ICD-10-CM

## 2019-02-28 DIAGNOSIS — M25.561 CHRONIC PAIN OF RIGHT KNEE: ICD-10-CM

## 2019-02-28 DIAGNOSIS — R26.89 IMPAIRED GAIT AND MOBILITY: ICD-10-CM

## 2019-02-28 PROCEDURE — 97110 THERAPEUTIC EXERCISES: CPT | Mod: PN

## 2019-02-28 NOTE — PROGRESS NOTES
"  Physical Therapy Daily Treatment Note     Name: Sly Villarreal  Clinic Number: [id number]    Therapy Diagnosis:   Encounter Diagnoses   Name Primary?    Chronic pain of right knee     Decreased range of motion of right knee     Decreased strength of lower extremity     Impaired gait and mobility      Physician: Marcus Bro MD    Visit Date: 2/28/2019    Physician Orders: PT Eval and Treat   Medical Diagnosis from Referral: primary osteoarthritis of right knee  Evaluation Date: 1/29/2019  Authorization Period Expiration: 12/31/19  Plan of Care Expiration: 1/29/2019 to 3/15/19  Visit # / Visits authorized: 9/ 50  FOTO: 9/10   Visit amount:  121.28  Total amount: 884.04    Time In: 1000  Time Out: 1110  Total Billable Time: 60 minutes (TE-4)    Precautions: Standard and Diabetes    Subjective     Pt reports: no new complaints``  He was compliant with home exercise program.  Response to previous treatment: no adverse effects  Functional change: none    Pain: 2/10  Location: right knee      Objective     Pt's daughter assisted with translation during therapy session    Sly received therapeutic exercises to develop strength, ROM and flexibility for 55 minutes including:       Quad sets        5" x20   Seated hamstring stretch      5' foot prop on chair with 3# above and below knee  Hamstring stretch at stairs      3x30" R  Fitter         3x30"   Leg press        5.0 3x10                                                                                                          4.0 3x10 R  TKE         2x10 3"   Steamboats        RTB 3x10   Step ups        3x10 L2  Step downs       3x10 L1  Bike         5'     Sly received gait training to improve functional mobility for 5 minutes including:    Ambulation in gym and outside up/down curbs without AD    CP to (R) knee x 10' at end of session with heel prop      Home Exercises Provided and Patient Education Provided     Education provided:   - Importance of " performing HEP and attending therapy    Written Home Exercises Provided: Patient instructed to cont prior HEP.  Exercises were reviewed and Sly was able to demonstrate them prior to the end of the session.  Sly demonstrated good  understanding of the education provided.     See EMR under Patient Instructions for exercises provided 2/28/19      Assessment     Patient tolerated progression of exercises well with no complaints of pain or discomfort.     Sly is progressing well towards his goals.   Pt prognosis is Good.     Pt will continue to benefit from skilled outpatient physical therapy to address the deficits listed in the problem list box on initial evaluation, provide pt/family education and to maximize pt's level of independence in the home and community environment.     Pt's spiritual, cultural and educational needs considered and pt agreeable to plan of care and goals.    Anticipated barriers to physical therapy: comorbidities, language barrier    Goals:     Short Term Goals:     1. Pt will be independent with HEP - MET  2. Pt will improve (R) LE strength to at least 75% of (L) LE strength as measured via Micro[employer]et handheld dynamometer in order to improve functional mobility - progressing not met  3. Pt will improve (R) knee AROM to at least 5-110 in order to improve gait - progressing not met     Long Term Goals:  1. Pt will be independent with updated HEP- progressing not met  2. Pt will improve (R) LE strength to at least 90% of (L) LE strength as measured via MicroFet handheld dynamometer in order to improve functional mobility - progressing not met  3. Pt will improve (R) knee AROM to at least 2-115 degrees in order to improve gait and ability to perform ADLs - progressing not met  4. Pt will improve FOTO knee survey score to </= 46% limited in order to demo improved functional mobility - progressing not met  5. Pt will improve score on Function,Daily Living section of KOOS to at least  31/68  ( 45% limited) in order to demo improved functional mobility - progressing not met  6. Pt will perform TUG in < 10 seconds without AD in order to demo improved gait speed - progressing not met  7. Pt will perform at least 10 sit to stands without UE support on 30 second sit to stand test in order to demo improved ability to perform transfers - progressing not met      Plan     Continue plan of care. Give theraband next for HEP.    Rachelle Hirsch, PT

## 2019-02-28 NOTE — PATIENT INSTRUCTIONS
Strengthening: Quadriceps Set    Tighten muscles on top of thighs by pushing knees down into surface. Hold 5 seconds.  Repeat 10 times left leg per set. Do 2 sets per session. Do 2 sessions per day.      Straight Leg Raise    With left leg straight, other leg bent, raise straight leg until knees are even. Slowly lower.  Repeat 10 times left leg per set. Do 2 sets per session. Do 2 sessions per day.      ABDUCTION     Lie right side lying, legs straight. Move left leg out to the side and slowly return.   Repeat 10 times left leg per set. Do 2 sets per session. Do 2 sessions per day.      Gastroc, Sitting (Passive)    Sit with leg straight out in front of you and a towel under your heel and around ball of foot. Gently pull toward body. Hold 30 seconds.   Repeat 3 times left side per set. Do 1 sets per session. Do 2 sessions per day.    Copyright © I. All rights reserved.    KNEE EXTENSION STRETCH - PROPPED        While seated, prop your foot up on another chair and allow gravity to stretch your knee towards a more straightened position.    Hold 5 minutes.    Perform 1 time every hour you are awake.    **Awake 8 hours = 8 times per day**    Copyright © 4012-1770 [employer]    Heel Slides    With towel around left heel, gently pull knee up with towel until stretch is felt. Hold 10 seconds.  Repeat 10 times left leg per set. Do 1 sets per session. Do 2 sessions per day.        Extension, Long Arc Quads - Sitting    Raise leg until knee is straight.  Repeat 10 times left leg per set. Do 3 sets per session. Do 2 sessions per day.        Extension, Short Arc Quads - Supine    Place bolster under knees. Raise one leg until knee is straight.  Repeat 10 times left leg per set. Do 3 sets per session. Do 2 sessions per day.        Strengthening: Hip Adduction - Isometric      Sit back or lie down with ball or folded pillow between knees, and squeeze knees together. Hold 5 seconds.  Repeat 10 times per set. Do 1 sets per  session. Do 2 sessions per day.        Bridging    Flatten back against floor then slowly raise buttocks from floor, keeping stomach tight. Hold 1 seconds.  Repeat 10 times per set. Do 3 sets per session. Do 2 sessions per day.        Seated Marching    Sit, both feet flat. Lift right knee toward ceiling. Lower and lift left knee toward the ceiling. Repeat, alternating sides.  Complete 3 sets of 10 repetitions. Perform 2 sessions per day.        Slow Stand to Sit    Stand up, using hands if needed. Keep chest and head upright, bend your knees, don't use hands, and slowly lower back to the chair. Move as slowly as you can.  Repeat 10 times per set. Do 3 sets per session. Do 2 sessions per day.    Strengthening: Hip Abduction - Resisted        With tubing around ankles, extend leg out from side.  Repeat 10 times per set. Do 3 sets per session. Do 1 sessions per day.     [email or web address]     Copyright © [employer]. All rights reserved.   Strengthening: Hip Extension - Resisted        With tubing around ankles, pull leg straight back.  Repeat 10 times per set. Do 3 sets per session. Do 1 sessions per day.     [email or web address]     Copyright © [employer]. All rights reserved.   Strengthening: Hip Flexion - Resisted        With tubing around ankles, bring leg forward, keeping knee straight.  Repeat 10 times per set. Do 3 sets per session. Do 1 sessions per day.     [email or web address]     Copyright © [employer]. All rights reserved.       Terminal Knee Extension (Standing)        Facing anchor with right knee slightly bent and tubing just above knee, gently pull knee back straight. Do not overextend knee. Hold 3 seconds.  Repeat 10 times per set. Do 3 sets per session. Do 1 sessions per day.     [email or web address]     Copyright © [employer]. All rights reserved.

## 2019-03-06 ENCOUNTER — CLINICAL SUPPORT (OUTPATIENT)
Dept: REHABILITATION | Facility: HOSPITAL | Age: 79
End: 2019-03-06
Payer: MEDICARE

## 2019-03-06 DIAGNOSIS — G89.29 CHRONIC PAIN OF RIGHT KNEE: ICD-10-CM

## 2019-03-06 DIAGNOSIS — M25.561 CHRONIC PAIN OF RIGHT KNEE: ICD-10-CM

## 2019-03-06 DIAGNOSIS — M25.661 DECREASED RANGE OF MOTION OF RIGHT KNEE: ICD-10-CM

## 2019-03-06 DIAGNOSIS — R26.89 IMPAIRED GAIT AND MOBILITY: ICD-10-CM

## 2019-03-06 DIAGNOSIS — R29.898 DECREASED STRENGTH OF LOWER EXTREMITY: ICD-10-CM

## 2019-03-06 PROCEDURE — 97110 THERAPEUTIC EXERCISES: CPT | Mod: PN

## 2019-03-06 PROCEDURE — G8980 MOBILITY D/C STATUS: HCPCS | Mod: CI,PN

## 2019-03-06 PROCEDURE — G8979 MOBILITY GOAL STATUS: HCPCS | Mod: CK,PN

## 2019-03-06 NOTE — Clinical Note
Please see PT DC Summary for Sly Villarreal,  [birth year]. Thank you for this referral. Esperanza Castañeda, PT3/2019

## 2019-03-06 NOTE — PROGRESS NOTES
"  Physical Therapy Daily Treatment Note     Name: Sly Villarreal  Clinic Number: [id number]    Therapy Diagnosis:   Encounter Diagnoses   Name Primary?    Chronic pain of right knee     Decreased range of motion of right knee     Decreased strength of lower extremity     Impaired gait and mobility      Physician: Marcus Bro MD    Visit Date: 3/6/2019    Physician Orders: PT Eval and Treat   Medical Diagnosis from Referral: primary osteoarthritis of right knee  Evaluation Date: 1/29/2019  Authorization Period Expiration: 12/31/19  Plan of Care Expiration: 1/29/2019 to 3/15/19  Visit # / Visits authorized: 11/ 50  FOTO: 11      Gcode: 11  Visit:  90.96  Total: 975    Time In: 9:00 am   Time Out: 9:44 am  Total Billable Time: 39 minutes    Precautions: Standard and Diabetes    Subjective     Pt reports: that he has been ambulating without cane without any difficulty.    He was compliant with home exercise program.  Response to previous treatment: no adverse effects  Functional change: able to ambulate without AD    Pain: 2/10  Location: right knee      Objective     Pt's daughter assisted with translation during therapy session    Objective measurements were taken and Sly received therapeutic exercises to develop strength, ROM and flexibility for 39 minutes including below and bike x 5' supervised      Hamstring stretch at stairs      3x30" R  gastroc stretch      3x30" w/strap   Leg press        5.0 3x10 NOT PERFORMED THIS TREATMENT           4.0 3x10 R NOT PERFORMED THIS TREATMENT  TKE         2x10 3"   Steamboats        RTB 3x10   Step ups        2x15 stairs  Step downs       3x10 L1  Bike         5'         Home Exercises Provided and Patient Education Provided     Education provided:   - Pt was educated on and given handout on updated HEP     Written Home Exercises Provided: yes.  Exercises were reviewed and Sly was able to demonstrate them prior to the end of the session.  Sly demonstrated good "  understanding of the education provided.     See EMR under Patient Instructions for exercises provided 3/6/2019.      Assessment     See DC summary below    Pt's spiritual, cultural and educational needs considered and pt agreeable to plan of care and goals.      Goals: See DC summary below      Plan     DC from PT     Outpatient Therapy Discharge Summary     Name: Sly Villarreal  Clinic Number: [id number]    Therapy Diagnosis:   Encounter Diagnoses   Name Primary?    Chronic pain of right knee     Decreased range of motion of right knee     Decreased strength of lower extremity     Impaired gait and mobility      Physician: Marcus Bro MD    Physician Orders: PT Eval and Treat   Medical Diagnosis from Referral: primary osteoarthritis of right knee  Evaluation Date: 2019      Date of Last visit: 3/6/19  Total Visits Received: 11      Assessment      Since initial evaluation on 19, pt has attended 10 PT follow up sessions. Pt has made steady progress with his PT treatments as evident by subjective and objective improvements. Pt demo significant improvement in (R) LE strength, (R) knee ROM, gait, and functional mobility compared to initial evaluation. Pt was agreeable to being discharged with HEP at this time. See objective measurements below.       AROM:   right knee: 2- 126 degrees     PROM:   Right knee:  0- NT degrees      L/E Strength w/ MicroFET Muscle Letty Dynamometer Right Left Pain/Dysfunction with Movement   (approx 4 sec hold w/ max contraction)     != pain   Hip Flexion 19.0 kg  20.5 kg      Hip Abduction 19.2 kg  20.0 kg      Quadriceps 14.9 kg  14.8 kg      Hamstrings 15.2 kg  15.9 kg         TU secs without AD      30 second sit-to-stand test (without U/E support): 8    FOTO: 39% limited     Function, Daily Living section of KOOS: 10/68 ( 14.7% limited)     Goals:   Short Term Goals:     1. Pt will be independent with HEP - Met  2. Pt will improve (R) LE strength to at least 75% of  (L) LE strength as measured via MicroFet handheld dynamometer in order to improve functional mobility -  Met  3. Pt will improve (R) knee AROM to at least 5-110 in order to improve gait -  Met     Long Term Goals:  1. Pt will be independent with updated HEP-  Met  2. Pt will improve (R) LE strength to at least 90% of (L) LE strength as measured via MicroFet handheld dynamometer in order to improve functional mobility -  Met  3. Pt will improve (R) knee AROM to at least 2-115 degrees in order to improve gait and ability to perform ADLs -  Met  4. Pt will improve FOTO knee survey score to </= 46% limited in order to demo improved functional mobility - Met  5. Pt will improve score on Function,Daily Living section of KOOS to at least 31/68  ( 45% limited) in order to demo improved functional mobility - Met  6. Pt will perform TUG in < 10 seconds without AD in order to demo improved gait speed -  Not Met  7. Pt will perform at least 10 sit to stands without UE support on 30 second sit to stand test in order to demo improved ability to perform transfers -Not Met      Discharge reason: Patient has reached the maximum rehab potential for the present time    Plan   This patient is discharged from Physical Therapy        Esperanza Castañeda, PT

## 2019-06-24 DIAGNOSIS — M17.12 PRIMARY OSTEOARTHRITIS OF LEFT KNEE: Primary | ICD-10-CM

## 2019-07-10 ENCOUNTER — CLINICAL SUPPORT (OUTPATIENT)
Dept: LAB | Facility: HOSPITAL | Age: 79
End: 2019-07-10
Attending: ORTHOPAEDIC SURGERY
Payer: MEDICARE

## 2019-07-10 ENCOUNTER — HOSPITAL ENCOUNTER (OUTPATIENT)
Dept: RADIOLOGY | Facility: HOSPITAL | Age: 79
Discharge: HOME | End: 2019-07-10
Attending: ORTHOPAEDIC SURGERY
Payer: MEDICARE

## 2019-07-10 DIAGNOSIS — Z01.818 PREOP EXAMINATION: Primary | ICD-10-CM

## 2019-07-10 DIAGNOSIS — Z01.818 PREOP EXAMINATION: ICD-10-CM

## 2019-07-10 PROCEDURE — 93005 ELECTROCARDIOGRAM TRACING: CPT

## 2019-07-10 PROCEDURE — 71046 X-RAY EXAM CHEST 2 VIEWS: CPT | Mod: 26,,, | Performed by: RADIOLOGY

## 2019-07-10 PROCEDURE — 71046 XR CHEST PA AND LATERAL: ICD-10-PCS | Mod: 26,,, | Performed by: RADIOLOGY

## 2019-07-10 PROCEDURE — 71046 X-RAY EXAM CHEST 2 VIEWS: CPT | Mod: TC,FY

## 2019-07-16 PROBLEM — E03.9 HYPOTHYROIDISM: Status: ACTIVE | Noted: 2019-07-16

## 2019-07-16 PROBLEM — M25.661 DECREASED RANGE OF MOTION OF RIGHT KNEE: Status: RESOLVED | Noted: 2019-01-29 | Resolved: 2019-07-16

## 2019-07-16 PROBLEM — E11.9 TYPE 2 DIABETES MELLITUS, WITHOUT LONG-TERM CURRENT USE OF INSULIN: Status: ACTIVE | Noted: 2019-07-16

## 2019-07-16 PROBLEM — R29.898 DECREASED STRENGTH OF LOWER EXTREMITY: Status: RESOLVED | Noted: 2019-01-29 | Resolved: 2019-07-16

## 2019-07-16 NOTE — PROGRESS NOTES
Subjective                                                                                                                                                                           Chief Complaint: pre-op exam    HPI  Sly Villarreal is a 79 y.o. male who  has a past medical history of Hypothyroid (2019) and Type 2 diabetes mellitus (2019) last A1c 7 that is well controlled and BPH. The patient presents to clinic for pre-operative examination.    HPI: I had seen this pleasant 79 y.o. male in the pre-op clinic today prior to his elective left knee arthroplasty with Dr. Bro for his left knee osteoarthritis. He has left knee OA and this is affecting him due to the pain. He also had right knee OA s/p a knee replacement and this was improved with no complications. He is active and walks without CP or SOB. His bp at home is in the 130s systolic and he does not take any medications for his blood pressure except flomax which is used for his BPH. No history of blood clots.     Active Cardiac Conditions: History is not suggestive of unstable coronary syndrome, decompensated heart failure, significant arrhythmias, severe valvular disease.    Exercise Tolerance: Pt can undertake activities such as shopping, flights of stairs, golfing and running without chest pain or shortness of breath making his exercise tolerance more than 4 METs.     Clinical Cardiac Risk Factors: Pt does not have a history of Diabetes Mellitus    Current Anticoagulants and Antiplatelet Therapy: No    Anesthesia  No personal or family history of complications with anesthesia.    Review of Systems   Constitutional: Negative for chills, fever and weight loss.   HENT: Negative for congestion, nosebleeds and sore throat.    Eyes: Negative for blurred vision, photophobia and pain.   Respiratory: Negative for cough, shortness of breath and wheezing.    Cardiovascular: Negative for chest pain and leg swelling.   Gastrointestinal: Negative for abdominal pain,  "blood in stool, constipation, diarrhea and melena.   Genitourinary: Negative for dysuria and hematuria.   Musculoskeletal: Positive for joint pain. Negative for falls and myalgias.   Skin: Negative for itching and rash.   Neurological: Negative for seizures, loss of consciousness and weakness.   Endo/Heme/Allergies: Does not bruise/bleed easily.   Psychiatric/Behavioral: Negative for depression, hallucinations and suicidal ideas.        Past Medical History:   Diagnosis Date    Hypothyroid 2019    Type 2 diabetes mellitus 2019       Past Surgical History:   Procedure Laterality Date    ARTHROPLASTY, KNEE, SIGHT ASSISTED Right 1/28/2019    Performed by Marcus Bro [facility] at [facility] OR    CRYOTHERAPY, NERVE, PERIPHERAL, PERCUTANEOUS, USING LIQUID NITROUS OXIDE IN CLOSED NEEDLE DEVICE Right 1/24/2019    Performed by [place] S[facility]land at [facility] OR    JOINT REPLACEMENT Right     knee       History reviewed. No pertinent family history.    Social History     Tobacco Use    Smoking status: Never Smoker   Substance Use Topics    Alcohol use: No    Drug use: Never       Review of patient's allergies indicates:  No Known Allergies     Objective                                                                                                                                                                             Vitals:    07/17/19 0858   BP: (!) 151/86   BP Location: Right arm   Patient Position: Sitting   BP Method: Large (Automatic)   Pulse: 69   Temp: 97 °F (36.1 °C)   TempSrc: Oral   Weight: 75.5 kg (166 lb 7.2 oz)   Height: 5' 7" (1.702 m)      Body mass index is 26.07 kg/m².    Physical Exam   Constitutional: He is oriented to person, place, and time and well-developed, well-nourished, and in no distress. No distress.   HENT:   Head: Normocephalic and atraumatic.   Right Ear: External ear normal.   Left Ear: External ear normal.   Mouth/Throat: No oropharyngeal exudate.   Eyes: Pupils are equal, round, and reactive to " light. Conjunctivae and EOM are normal.   Cardiovascular: Normal rate, regular rhythm and normal heart sounds. PMI is not displaced. Exam reveals no friction rub.   Pulmonary/Chest: Effort normal and breath sounds normal. Chest wall is not dull to percussion.   Abdominal: Soft. Bowel sounds are normal. There is no hepatosplenomegaly. There is no tenderness.   Musculoskeletal: He exhibits no edema, tenderness or deformity.   Decreased flexion and extension of left knee   Neurological: He is alert and oriented to person, place, and time. He has normal strength. Gait normal. Coordination normal.   Skin: Skin is warm, dry and intact. No rash noted. He is not diaphoretic. No cyanosis. Nails show no clubbing.   Healed scar on right knee. Varicose veins on lower legs    Psychiatric: Mood, memory and affect normal.        Laboratory:  Lab Results   Component Value Date    WBC 10.34 07/10/2019    HGB 14.1 07/10/2019    HCT 43.2 07/10/2019    MCV 86 07/10/2019     07/10/2019       Chemistry        Component Value Date/Time     07/10/2019 0702    K 4.1 07/10/2019 0702     07/10/2019 0702    CO2 29 07/10/2019 0702    BUN 34 (H) 07/10/2019 0702    CREATININE 1.1 07/10/2019 0702     (H) 07/10/2019 0702        Component Value Date/Time    CALCIUM 9.7 07/10/2019 0702    ALKPHOS 65 07/10/2019 0702    AST 22 07/10/2019 0702    ALT 34 07/10/2019 0702    BILITOT 0.7 07/10/2019 0702    ESTGFRAFRICA >60 07/10/2019 0702    EGFRNONAA >60 07/10/2019 0702          No results found for: MG, PHOS  No results found for: CHOL, HDL, LDLCALC, TRIG  No results found for: TSH  Lab Results   Component Value Date    HGBA1C 7.0 (H) 07/10/2019     I reviewed the most recent lab tests and pertinent recent labs.    Assessment/Plan                                                                                                                                                                Sly Villarreal is a 79 y.o. male who  presents to clinic for:    1. Primary osteoarthritis of left knee    2. Preop examination    3. Primary osteoarthritis of right knee    4. Type 2 diabetes mellitus with complication, without long-term current use of insulin    5. Hypothyroidism, unspecified type    6. Benign prostatic hyperplasia, unspecified whether lower urinary tract symptoms present    7. Gastroesophageal reflux disease, esophagitis presence not specified         Problem List Items Addressed This Visit        Renal/    BPH (benign prostatic hyperplasia)    Current Assessment & Plan     Continue flomax             Endocrine    Type 2 diabetes mellitus, without long-term current use of insulin    Overview     Lab Results   Component Value Date    HGBA1C 7.0 (H) 07/10/2019     Lab Results   Component Value Date    CREATININE 1.1 07/10/2019   On metformin             Current Assessment & Plan     Continue metformin  Stable and controlled.           Hypothyroidism    Overview     On synthroid  TSH WNL at PCP         Current Assessment & Plan     Continue synthroid   Last TSH WNL per patient             GI    GERD (gastroesophageal reflux disease)    Current Assessment & Plan     Controlled continue pepcid            Orthopedic    Primary osteoarthritis of right knee    Current Assessment & Plan     Patient had a past knee replacement and it is stable          Osteoarthritis of left knee - Primary    Overview     Surgery scheduled with Dr Bro  He reports significant pain and disability from this  He failed NSAIDs and PT             Other    Preop examination    Current Assessment & Plan     MET score 8.2  METS>4 patient is medically optimized for surgery                Medication List with Changes/Refills   Current Medications    BUMETANIDE (BUMEX) 1 MG TABLET    Take 1 tablet (1 mg total) by mouth every morning.    CHOLECALCIFEROL, VITAMIN D3, 50,000 UNIT CAPSULE    Take 1 capsule by mouth once weekly.    ERGOCALCIFEROL (ERGOCALCIFEROL) 50,000  UNIT CAP    Take 1 capsule (50,000 Units total) by mouth every 7 days.    FAMOTIDINE (PEPCID) 20 MG TABLET    Take 1 tablet (20 mg total) by mouth 2 (two) times daily.    LEVOTHYROXINE (SYNTHROID) 25 MCG TABLET    Take 25 mcg by mouth once daily.    MELOXICAM (MOBIC) 15 MG TABLET    TAKE 1 TABLET BY MOUTH DAILY WITH FOOD  FOR PAIN    METFORMIN (GLUCOPHAGE) 500 MG TABLET    Take 500 mg by mouth 2 (two) times daily with meals.    TAMSULOSIN (FLOMAX) 0.4 MG CAP    Take 1 capsule (0.4 mg total) by mouth once daily.    TIMOLOL MALEATE 0.5% (TIMOPTIC) 0.5 % DROP    INSTILL 1 DROP INTO EACH EYE TWICE DAILY     Per ACC/AHA criselda-operative guidelines, moderate risk surgery and METS >=4 is low risk for MACE and no further cardiac testing is required.     Per evaluation, patient is moderate risk for a moderate risk surgery. Patient is  medically optimized for surgery at this time. Instructed as above on criselda-operative medication recommendations and further risk reduction. All questions answered.     Follow up in about 3 months (around 10/17/2019). Follow up for further workup and reassessment or sooner as needed.    Patient expressed understanding after counseling regarding diagnosis and recommendations.    Sandip Ambriz MD   07/17/2019   9:57 PM

## 2019-07-16 NOTE — H&P (VIEW-ONLY)
Subjective                                                                                                                                                                           Chief Complaint: pre-op exam    HPI  Sly Villarreal is a 79 y.o. male who  has a past medical history of Hypothyroid (2019) and Type 2 diabetes mellitus (2019) last A1c 7 that is well controlled and BPH. The patient presents to clinic for pre-operative examination.    HPI: I had seen this pleasant 79 y.o. male in the pre-op clinic today prior to his elective left knee arthroplasty with Dr. Bro for his left knee osteoarthritis. He has left knee OA and this is affecting him due to the pain. He also had right knee OA s/p a knee replacement and this was improved with no complications. He is active and walks without CP or SOB. His bp at home is in the 130s systolic and he does not take any medications for his blood pressure except flomax which is used for his BPH. No history of blood clots.     Active Cardiac Conditions: History is not suggestive of unstable coronary syndrome, decompensated heart failure, significant arrhythmias, severe valvular disease.    Exercise Tolerance: Pt can undertake activities such as shopping, flights of stairs, golfing and running without chest pain or shortness of breath making his exercise tolerance more than 4 METs.     Clinical Cardiac Risk Factors: Pt does not have a history of Diabetes Mellitus    Current Anticoagulants and Antiplatelet Therapy: No    Anesthesia  No personal or family history of complications with anesthesia.    Review of Systems   Constitutional: Negative for chills, fever and weight loss.   HENT: Negative for congestion, nosebleeds and sore throat.    Eyes: Negative for blurred vision, photophobia and pain.   Respiratory: Negative for cough, shortness of breath and wheezing.    Cardiovascular: Negative for chest pain and leg swelling.   Gastrointestinal: Negative for abdominal pain,  "blood in stool, constipation, diarrhea and melena.   Genitourinary: Negative for dysuria and hematuria.   Musculoskeletal: Positive for joint pain. Negative for falls and myalgias.   Skin: Negative for itching and rash.   Neurological: Negative for seizures, loss of consciousness and weakness.   Endo/Heme/Allergies: Does not bruise/bleed easily.   Psychiatric/Behavioral: Negative for depression, hallucinations and suicidal ideas.        Past Medical History:   Diagnosis Date    Hypothyroid 2019    Type 2 diabetes mellitus 2019       Past Surgical History:   Procedure Laterality Date    ARTHROPLASTY, KNEE, SIGHT ASSISTED Right 1/28/2019    Performed by Marcus Bro MD at [facility] OR    CRYOTHERAPY, NERVE, PERIPHERAL, PERCUTANEOUS, USING LIQUID NITROUS OXIDE IN CLOSED NEEDLE DEVICE Right 1/24/2019    Performed by [place] Sutherland at [facility] OR    JOINT REPLACEMENT Right     knee       History reviewed. No pertinent family history.    Social History     Tobacco Use    Smoking status: Never Smoker   Substance Use Topics    Alcohol use: No    Drug use: Never       Review of patient's allergies indicates:  No Known Allergies     Objective                                                                                                                                                                             Vitals:    07/17/19 0858   BP: (!) 151/86   BP Location: Right arm   Patient Position: Sitting   BP Method: Large (Automatic)   Pulse: 69   Temp: 97 °F (36.1 °C)   TempSrc: Oral   Weight: 75.5 kg (166 lb 7.2 oz)   Height: 5' 7" (1.702 m)      Body mass index is 26.07 kg/m².    Physical Exam   Constitutional: He is oriented to person, place, and time and well-developed, well-nourished, and in no distress. No distress.   HENT:   Head: Normocephalic and atraumatic.   Right Ear: External ear normal.   Left Ear: External ear normal.   Mouth/Throat: No oropharyngeal exudate.   Eyes: Pupils are equal, round, and reactive to " light. Conjunctivae and EOM are normal.   Cardiovascular: Normal rate, regular rhythm and normal heart sounds. PMI is not displaced. Exam reveals no friction rub.   Pulmonary/Chest: Effort normal and breath sounds normal. Chest wall is not dull to percussion.   Abdominal: Soft. Bowel sounds are normal. There is no hepatosplenomegaly. There is no tenderness.   Musculoskeletal: He exhibits no edema, tenderness or deformity.   Decreased flexion and extension of left knee   Neurological: He is alert and oriented to person, place, and time. He has normal strength. Gait normal. Coordination normal.   Skin: Skin is warm, dry and intact. No rash noted. He is not diaphoretic. No cyanosis. Nails show no clubbing.   Healed scar on right knee. Varicose veins on lower legs    Psychiatric: Mood, memory and affect normal.        Laboratory:  Lab Results   Component Value Date    WBC 10.34 07/10/2019    HGB 14.1 07/10/2019    HCT 43.2 07/10/2019    MCV 86 07/10/2019     07/10/2019       Chemistry        Component Value Date/Time     07/10/2019 0702    K 4.1 07/10/2019 0702     07/10/2019 0702    CO2 29 07/10/2019 0702    BUN 34 (H) 07/10/2019 0702    CREATININE 1.1 07/10/2019 0702     (H) 07/10/2019 0702        Component Value Date/Time    CALCIUM 9.7 07/10/2019 0702    ALKPHOS 65 07/10/2019 0702    AST 22 07/10/2019 0702    ALT 34 07/10/2019 0702    BILITOT 0.7 07/10/2019 0702    ESTGFRAFRICA >60 07/10/2019 0702    EGFRNONAA >60 07/10/2019 0702          No results found for: MG, PHOS  No results found for: CHOL, HDL, LDLCALC, TRIG  No results found for: TSH  Lab Results   Component Value Date    HGBA1C 7.0 (H) 07/10/2019     I reviewed the most recent lab tests and pertinent recent labs.    Assessment/Plan                                                                                                                                                                Sly Villarreal is a 79 y.o. male who  presents to clinic for:    1. Primary osteoarthritis of left knee    2. Preop examination    3. Primary osteoarthritis of right knee    4. Type 2 diabetes mellitus with complication, without long-term current use of insulin    5. Hypothyroidism, unspecified type    6. Benign prostatic hyperplasia, unspecified whether lower urinary tract symptoms present    7. Gastroesophageal reflux disease, esophagitis presence not specified         Problem List Items Addressed This Visit        Renal/    BPH (benign prostatic hyperplasia)    Current Assessment & Plan     Continue flomax             Endocrine    Type 2 diabetes mellitus, without long-term current use of insulin    Overview     Lab Results   Component Value Date    HGBA1C 7.0 (H) 07/10/2019     Lab Results   Component Value Date    CREATININE 1.1 07/10/2019   On metformin             Current Assessment & Plan     Continue metformin  Stable and controlled.           Hypothyroidism    Overview     On synthroid  TSH WNL at PCP         Current Assessment & Plan     Continue synthroid   Last TSH WNL per patient             GI    GERD (gastroesophageal reflux disease)    Current Assessment & Plan     Controlled continue pepcid            Orthopedic    Primary osteoarthritis of right knee    Current Assessment & Plan     Patient had a past knee replacement and it is stable          Osteoarthritis of left knee - Primary    Overview     Surgery scheduled with Dr Bro  He reports significant pain and disability from this  He failed NSAIDs and PT             Other    Preop examination    Current Assessment & Plan     MET score 8.2  METS>4 patient is medically optimized for surgery                Medication List with Changes/Refills   Current Medications    BUMETANIDE (BUMEX) 1 MG TABLET    Take 1 tablet (1 mg total) by mouth every morning.    CHOLECALCIFEROL, VITAMIN D3, 50,000 UNIT CAPSULE    Take 1 capsule by mouth once weekly.    ERGOCALCIFEROL (ERGOCALCIFEROL) 50,000  UNIT CAP    Take 1 capsule (50,000 Units total) by mouth every 7 days.    FAMOTIDINE (PEPCID) 20 MG TABLET    Take 1 tablet (20 mg total) by mouth 2 (two) times daily.    LEVOTHYROXINE (SYNTHROID) 25 MCG TABLET    Take 25 mcg by mouth once daily.    MELOXICAM (MOBIC) 15 MG TABLET    TAKE 1 TABLET BY MOUTH DAILY WITH FOOD  FOR PAIN    METFORMIN (GLUCOPHAGE) 500 MG TABLET    Take 500 mg by mouth 2 (two) times daily with meals.    TAMSULOSIN (FLOMAX) 0.4 MG CAP    Take 1 capsule (0.4 mg total) by mouth once daily.    TIMOLOL MALEATE 0.5% (TIMOPTIC) 0.5 % DROP    INSTILL 1 DROP INTO EACH EYE TWICE DAILY     Per ACC/AHA criselda-operative guidelines, moderate risk surgery and METS >=4 is low risk for MACE and no further cardiac testing is required.     Per evaluation, patient is moderate risk for a moderate risk surgery. Patient is  medically optimized for surgery at this time. Instructed as above on criselda-operative medication recommendations and further risk reduction. All questions answered.     Follow up in about 3 months (around 10/17/2019). Follow up for further workup and reassessment or sooner as needed.    Patient expressed understanding after counseling regarding diagnosis and recommendations.    Sandip Ambriz MD   07/17/2019   9:57 PM

## 2019-07-17 ENCOUNTER — HOSPITAL ENCOUNTER (OUTPATIENT)
Dept: PREADMISSION TESTING | Facility: HOSPITAL | Age: 79
Discharge: HOME | End: 2019-07-17
Attending: ORTHOPAEDIC SURGERY
Payer: MEDICARE

## 2019-07-17 ENCOUNTER — OFFICE VISIT (OUTPATIENT)
Dept: FAMILY MEDICINE | Facility: HOSPITAL | Age: 79
End: 2019-07-17
Attending: FAMILY MEDICINE
Payer: MEDICARE

## 2019-07-17 ENCOUNTER — ANESTHESIA EVENT (OUTPATIENT)
Dept: SURGERY | Facility: HOSPITAL | Age: 79
End: 2019-07-17
Payer: MEDICARE

## 2019-07-17 VITALS
SYSTOLIC BLOOD PRESSURE: 151 MMHG | SYSTOLIC BLOOD PRESSURE: 149 MMHG | HEART RATE: 69 BPM | WEIGHT: 166.44 LBS | DIASTOLIC BLOOD PRESSURE: 86 MMHG | TEMPERATURE: 97 F | DIASTOLIC BLOOD PRESSURE: 72 MMHG | BODY MASS INDEX: 26.12 KG/M2 | BODY MASS INDEX: 26.39 KG/M2 | HEART RATE: 69 BPM | RESPIRATION RATE: 18 BRPM | HEIGHT: 67 IN | HEIGHT: 67 IN | TEMPERATURE: 97 F | WEIGHT: 168.13 LBS | OXYGEN SATURATION: 99 %

## 2019-07-17 DIAGNOSIS — M17.11 PRIMARY OSTEOARTHRITIS OF RIGHT KNEE: ICD-10-CM

## 2019-07-17 DIAGNOSIS — M17.12 OSTEOARTHRITIS OF LEFT KNEE, UNSPECIFIED OSTEOARTHRITIS TYPE: Primary | ICD-10-CM

## 2019-07-17 DIAGNOSIS — Z01.818 PREOP EXAMINATION: ICD-10-CM

## 2019-07-17 DIAGNOSIS — E11.8 TYPE 2 DIABETES MELLITUS WITH COMPLICATION, WITHOUT LONG-TERM CURRENT USE OF INSULIN: ICD-10-CM

## 2019-07-17 DIAGNOSIS — K21.9 GASTROESOPHAGEAL REFLUX DISEASE, ESOPHAGITIS PRESENCE NOT SPECIFIED: ICD-10-CM

## 2019-07-17 DIAGNOSIS — M17.12 PRIMARY OSTEOARTHRITIS OF LEFT KNEE: Primary | ICD-10-CM

## 2019-07-17 DIAGNOSIS — E03.9 HYPOTHYROIDISM, UNSPECIFIED TYPE: ICD-10-CM

## 2019-07-17 DIAGNOSIS — N40.0 BENIGN PROSTATIC HYPERPLASIA, UNSPECIFIED WHETHER LOWER URINARY TRACT SYMPTOMS PRESENT: ICD-10-CM

## 2019-07-17 PROBLEM — M17.9 OSTEOARTHRITIS OF KNEE: Status: RESOLVED | Noted: 2019-01-28 | Resolved: 2019-07-17

## 2019-07-17 PROBLEM — G89.29 CHRONIC PAIN OF RIGHT KNEE: Status: RESOLVED | Noted: 2019-01-29 | Resolved: 2019-07-17

## 2019-07-17 PROBLEM — M25.561 CHRONIC PAIN OF RIGHT KNEE: Status: RESOLVED | Noted: 2019-01-29 | Resolved: 2019-07-17

## 2019-07-17 PROBLEM — R26.89 IMPAIRED GAIT AND MOBILITY: Status: RESOLVED | Noted: 2019-01-29 | Resolved: 2019-07-17

## 2019-07-17 PROCEDURE — 99214 OFFICE O/P EST MOD 30 MIN: CPT | Performed by: STUDENT IN AN ORGANIZED HEALTH CARE EDUCATION/TRAINING PROGRAM

## 2019-07-17 RX ORDER — CEFAZOLIN SODIUM 2 G/50ML
2 SOLUTION INTRAVENOUS ONCE
Status: CANCELLED | OUTPATIENT
Start: 2019-07-22

## 2019-07-17 RX ORDER — LIDOCAINE HYDROCHLORIDE 10 MG/ML
1 INJECTION, SOLUTION EPIDURAL; INFILTRATION; INTRACAUDAL; PERINEURAL ONCE
Status: CANCELLED | OUTPATIENT
Start: 2019-07-17 | End: 2019-07-17

## 2019-07-17 RX ORDER — TRANEXAMIC ACID 650 MG/1
1950 TABLET ORAL ONCE
Status: CANCELLED | OUTPATIENT
Start: 2019-07-22

## 2019-07-17 RX ORDER — ACETAMINOPHEN 500 MG
1000 TABLET ORAL
Status: CANCELLED | OUTPATIENT
Start: 2019-07-22 | End: 2019-07-22

## 2019-07-17 RX ORDER — TIMOLOL MALEATE 5 MG/ML
SOLUTION/ DROPS OPHTHALMIC
Refills: 4 | Status: ON HOLD | COMMUNITY
Start: 2019-06-11 | End: 2023-05-04 | Stop reason: CLARIF

## 2019-07-17 RX ORDER — SODIUM CHLORIDE, SODIUM LACTATE, POTASSIUM CHLORIDE, CALCIUM CHLORIDE 600; 310; 30; 20 MG/100ML; MG/100ML; MG/100ML; MG/100ML
INJECTION, SOLUTION INTRAVENOUS CONTINUOUS
Status: CANCELLED | OUTPATIENT
Start: 2019-07-17

## 2019-07-17 RX ORDER — PREGABALIN 75 MG/1
150 CAPSULE ORAL
Status: CANCELLED | OUTPATIENT
Start: 2019-07-22 | End: 2019-07-22

## 2019-07-17 RX ORDER — CELECOXIB 100 MG/1
400 CAPSULE ORAL
Status: CANCELLED | OUTPATIENT
Start: 2019-07-22 | End: 2019-07-22

## 2019-07-17 NOTE — PRE-PROCEDURE INSTRUCTIONS
Kimberly Omar, [phone] daughter    Allergies, medical, surgical, family and psychosocial histories reviewed with patient. Periop plan of care reviewed. Preop instructions given, including medications to take and to hold. Hibiclens soap and instructions on use given. Time allotted for questions to be addressed.  Patient verbalized understanding.  With Daughter translating.

## 2019-07-17 NOTE — ANESTHESIA PREPROCEDURE EVALUATION
07/17/2019  Sly Villarreal is a 79 y.o., male right sigh assisted knee arthroplasty under spinal/MAC/gen on 7/22/19.    Serbian speaking patient,  present.    Family medicine optimization pending.     Anesthesia Evaluation    I have reviewed the Patient Summary Reports.    I have reviewed the Nursing Notes.   I have reviewed the Medications.     Review of Systems  Anesthesia Hx:  No problems with previous Anesthesia  Denies Family Hx of Anesthesia complications.    Social:  Non-Smoker, No Alcohol Use    Hematology/Oncology:  Hematology Normal   Oncology Normal     Cardiovascular:  Cardiovascular Normal   Denies Angina.     ECG has been reviewed.    Pulmonary:  Pulmonary Normal  Denies Shortness of breath.    Renal/:  Renal/ Normal     Hepatic/GI:  Hepatic/GI Normal Denies Liver Disease.    Musculoskeletal:   Arthritis     Neurological:  Neurology Normal Denies Seizures.    Endocrine:   Diabetes, well controlled, type 2 Hypothyroidism        Physical Exam  General:  Well nourished    Airway/Jaw/Neck:  Airway Findings: Mouth Opening: Normal Tongue: Normal  General Airway Assessment: Adult  Mallampati: II      Dental:  Dental Findings: Periodontal disease, Severe   Chest/Lungs:  Chest/Lungs Findings: Clear to auscultation, Normal Respiratory Rate     Heart/Vascular:  Heart Findings: Rate: Normal  Rhythm: Regular Rhythm  Sounds: Normal  Heart murmur: negative       Mental Status:  Mental Status Findings:  Cooperative, Alert and Oriented       EKG 7/10/19:  Normal sinus rhythm  Normal ECG  When compared with ECG of 10-HINA-2019 13:04,  No significant change was found    Echo 1/24/19:  · Normal left ventricular systolic function. The estimated ejection fraction is 60%  · Grade I (mild) left ventricular diastolic dysfunction consistent with impaired relaxation.  · Normal right ventricular systolic  function.  · Mild aortic regurgitation.  · Mild mitral regurgitation.  · The aortic valve is bileaflet.  · Mild tricuspid regurgitation.  · Normal left atrial pressure.  · Normal central venous pressure (3 mm Hg).  · The estimated PA systolic pressure is 23 mm Hg         Anesthesia Plan  Type of Anesthesia, risks & benefits discussed:  Anesthesia Type:  general, MAC, spinal, regional  Patient's Preference:   Intra-op Monitoring Plan: standard ASA monitors  Intra-op Monitoring Plan Comments:   Post Op Pain Control Plan: per primary service following discharge from PACU  Post Op Pain Control Plan Comments:   Induction:   IV  Beta Blocker:         Informed Consent: Patient understands risks and agrees with Anesthesia plan.  Questions answered. Anesthesia consent signed with patient.  ASA Score: 2     Day of Surgery Review of History & Physical:  There are no significant changes.      Anesthesia Plan Notes: Anesthesia consent to be signed prior to procedure on 7/22/19  Family medicine optimization pending.         Ready For Surgery From Anesthesia Perspective.

## 2019-07-17 NOTE — PROGRESS NOTES
I have reviewed the notes, assessments, and/or procedures performed, I concur with her/his documentation of Sly Villarreal.

## 2019-07-17 NOTE — DISCHARGE INSTRUCTIONS
Your surgery is scheduled for 7/22.    Please report to Outpatient Surgery Intake Office on the 2nd FLOOR at 0815a.m.          INSTRUCTIONS IMPORTANT!!!  ¨ Do not eat or drink after 12 midnight-including water. OK to brush teeth, no   gum, candy or mints!    ¨ Take only these medicines with a small swallow of water-morning of surgery.  Synthroid        ____  Proceed to [facility] on *** for additional blood test.        ____  Do not wear makeup, including mascara.  ____  No powder, lotions or creams to surgical area.  ____  Please remove all jewelry, including piercings and leave at home.  ____  No money or valuables needed. Please leave at home.  ____  Please bring any documents given by your doctor.  ____  If going home the same day, arrange for a ride home. You will not be able to             drive if Anesthesia was used.  ____  Children under 18 years require a parent / guardian present the entire time             they are in surgery / recovery.  ____  Wear loose fitting clothing. Allow for dressings, bandages.  ____  Stop Aspirin, Ibuprofen, Motrin and Aleve at least 3-5 days before surgery, unless otherwise instructed by your doctor, or the nurse.   You MAY use Tylenol/acetaminophen until day of surgery.  ____  Wash the surgical area with Hibiclens the night before surgery, and again the             morning of surgery.  Be sure to rinse hibiclens off completely (if instructed by   nurse).  ____  If you take diabetic medication, do not take am of surgery unless instructed by Doctor.  ____  Call MD for temperature above 101 degrees or any other signs of infection such as Urinary (bladder) infection, Upper respiratory infection, skin boils, etc.  ____ Stop taking any Fish Oil supplement or any Vitamins that contain Vitamin E at least 5 days prior to surgery.  ____ Do Not wear your contact lenses the day of your procedure.  You may wear your glasses.      ____Do not shave surgical site for 3 days  prior to surgery.  ____ Practice Good hand washing before, during, and after procedure.      I have read or had read and explained to me, and understand the above information.  Additional comments or instructions:  For additional questions call [phone]      ANESTHESIA SIDE EFFECTS  -For the first 24 hours after surgery:  Do not drive, use heavy equipment, make important decisions, or drink alcohol  -It is normal to feel sleepy for several hours.  Rest until you are more awake.  -Have someone stay with you, if needed.  They can watch for problems and help keep you safe.  -Some possible post anesthesia side effects include: nausea and vomiting, sore throat and hoarseness, sleepiness, and dizziness.        Pre-Op Bathing Instructions    Before surgery, you can play an important role in your own health.    Because skin is not sterile, we need to be sure that your skin is as free of germs as possible. By following the instructions below, you can reduce the number of germs on your skin before surgery.    IMPORTANT: You will need to shower with a special soap called Hibiclens*, available at any pharmacy.  If you are allergic to Chlorhexidine (the antiseptic in Hibiclens), use an antibacterial soap such as Dial Soap for your preoperative shower.  You will shower with Hibiclens both the night before your surgery and the morning of your surgery.  Do not use Hibiclens on the head, face or genitals to avoid injury to those areas.    STEP #1: THE NIGHT BEFORE YOUR SURGERY     1. Do not shave the area of your body where your surgery will be performed.  2. Shower and wash your hair and body as usual with your normal soap and shampoo.  3. Rinse your hair and body thoroughly after you shower to remove all soap residue.  4. With your hand, apply one packet of Hibiclens soap to the surgical site.   5. Wash the site gently for five (5) minutes. Do not scrub your skin too hard.   6. Do not wash with your regular soap after Hibiclens is  used.  7. Rinse your body thoroughly.  8. Pat yourself dry with a clean, soft towel.  9. Do not use lotion, cream, or powder.  10. Wear clean clothes.    STEP #2: THE MORNING OF YOUR SURGERY     1. Repeat Step #1.    * Not to be used by people allergic to Chlorhexidine.            Total Knee Replacement  During total knee replacement surgery, your damaged knee joint is replaced with an artificial joint, called a prosthesis. This surgery almost always reduces joint pain and improves your quality of life.     The parts of the prosthesis are secured to the bones of the knee. Together they form the new joint.   Before your surgery  You will most likely arrive at the hospital on the morning of the surgery. Be sure to follow all of your doctors instructions on preparing for surgery:  · Follow any directions you are given for taking medicines or for not eating or drinking before surgery.  · At the hospital, your temperature, pulse, breathing, and blood pressure will be checked.  · An IV (intravenous) line will be started to provide fluids and medicines needed during surgery.  The surgical procedure  When the surgical team is ready, youll be taken to the operating room. There youll be given anesthesia to help you sleep through surgery, or to make you numb from the waist down. Then an incision is made on the front or side of your knee. Any damaged bone is cleaned away, and the new joint components are put into place. The incision is closed with surgical staples or stitches.  After your surgery  After surgery, youll be sent to the recovery room. When you are fully awake, youll be moved to your room. The nurses will give you medicines to ease your pain. You may have a catheter (small tube) in your bladder. A continuous passive motion machine may be used on your knee to keep it from getting stiff. A sequential compression machine may be used to prevent blood clots by gently squeezing then releasing your leg. You may be  given medicine to prevent blood clots. Soon, healthcare providers will help you get up and moving.  When to call your doctor  Once at home, call your doctor if you have any of the symptoms below:  · An increase in knee pain  · Pain or swelling in the calf or leg  · Unusual redness, heat, or drainage at the incision site  · Fever of 100.4°F (38°C) or higher  · Shaking chills  · Trouble breathing or chest pain (call 911)   Date Last Reviewed: 9/20/2015 © 2000-2017 [employer]. [address] [place] [address]. All rights reserved. This information is not intended as a substitute for professional medical care. Always follow your healthcare professional's instructions.        Types of Anesthesia  Your anesthesiologist is a key member of your surgical team. He or she gives you anesthetics (medications to keep you comfortable and decrease your awareness of surgery) and monitors your condition to keep you safe during surgery. You will have 1 of 3 kinds of anesthesia during your surgery.  Monitored anesthesia care (MAC)  · Often used for surgery that is short or not too invasive.  · Sedatives (medicines to relax you) are given through an IV (intravenous) line.  · The area around the surgical site is usually numbed with a local anesthetic.  · You may choose to remain awake or sleep lightly.  Regional anesthesia (sometimes called spinal epidural or rasta block)  · Often used for surgery on the arms, legs, and abdomen. It is also used during childbirth.  · A specific region of your body is numbed by injecting anesthetic near nerves, near your spine, or near the operative site.  · You may also be given sedatives through an IV line to relax you.  · With regional anesthesia, you may choose to remain awake or sleep lightly.  General anesthesia  · Often used for extensive surgery, such as on the heart, brain, or abdominal operation.  · Also used when the patient wants to be totally asleep.  · May be given as  a gas that you breathe and as medicines that are injected through an IV line.  · Because you are asleep, you feel no pain and remember nothing of the surgery.  The risks and complications of anesthesia depend on your overall health. If you are healthy, the risks are low. The risks are higher for patients with heart or lung problems. Your anesthesiologist or nurse anesthetist will discuss the risks with you.   Date Last Reviewed: 12/1/2016 © 2000-2017 [employer]. [address] [place] [address]. All rights reserved. This information is not intended as a substitute for professional medical care. Always follow your healthcare professional's instructions.

## 2019-07-18 ENCOUNTER — PROCEDURE VISIT (OUTPATIENT)
Dept: PAIN MEDICINE | Facility: CLINIC | Age: 79
End: 2019-07-18
Payer: MEDICARE

## 2019-07-18 VITALS
SYSTOLIC BLOOD PRESSURE: 154 MMHG | HEART RATE: 81 BPM | BODY MASS INDEX: 26 KG/M2 | WEIGHT: 166 LBS | DIASTOLIC BLOOD PRESSURE: 81 MMHG

## 2019-07-18 DIAGNOSIS — M17.12 PRIMARY OSTEOARTHRITIS OF LEFT KNEE: Primary | ICD-10-CM

## 2019-07-18 DIAGNOSIS — M25.562 CHRONIC PAIN OF LEFT KNEE: ICD-10-CM

## 2019-07-18 DIAGNOSIS — G89.29 CHRONIC PAIN OF LEFT KNEE: ICD-10-CM

## 2019-07-18 PROCEDURE — 64640 INJECTION TREATMENT OF NERVE: CPT | Mod: S$PBB,LT,, | Performed by: NURSE PRACTITIONER

## 2019-07-18 PROCEDURE — 64640 INJECTION TREATMENT OF NERVE: CPT | Mod: PBBFAC,PO | Performed by: NURSE PRACTITIONER

## 2019-07-18 PROCEDURE — 64640 PR DESTRUCT BY NEURO AGENT; OTHER PERIPH NERVE: ICD-10-PCS | Mod: S$PBB,LT,, | Performed by: NURSE PRACTITIONER

## 2019-07-18 NOTE — PROCEDURES
Procedures Procedure Note Iovera:    DATE OF PROCEDURE:  07/18/2019  PREOPERATIVE DIAGNOSIS: left knee osteoarthritis.     POSTOPERATIVE DIAGNOSIS: left knee osteoarthritis.     PROCEDURE: Iovera treatment of anterior femoral cutaneous nerve, and both branches of infrapatellar saphenous nerve using at least 3 different punctures to treat all 3 nerves. (cpt 64640 x3)    ATTENDING SURGEON: EDWARD Mancuso  Interventional Pain Management        ASSISTANT: none    COMPLICATIONS: None.     IMPLANTS:  None    ESTIMATED BLOOD LOSS:  < 5cc    SPECIMENS REMOVED:  None    ANESTHESIA: Local lidocaine    INDICATIONS FOR PROCEDURE: This is a 79 y.o. male with longstanding knee pain. They have failed non operative management including injections.I discussed a new treatment therapy called Iovera, which is cryotherapy, to provide symptomatic relief along the sensory distribution of the infrapatellar tendon branch of the saphenous nerve  and AFCN. The patient elected to move forward with this  We did discuss the fact that this is a fairly novel procedure and there is very limited scientific data around this.  However, it FDA approved.  The patient was given patient information and literature to review prior to the procedure as well.  Based on this, the patient agreed to move forward with doing the procedure.      PROCEDURE:    The patient was placed supine on the exam table and the proximal medial aspect of the left tibia and anterior aspect of distal femur was prepped with sterile Betadine and alcohol.  A line was drawn extending approximately 5 cm medial to inferior pole of the patella distally to a point approximately 5 cm medial to the tibial tubercle.  A second line was drawn in a medial to lateral direction the width of the patella approximately 7 cm proximal to the patella. We then infiltrated the skin with lidocaine along both lines using a 25g needle. We then introduced the Iovera device along these lines and this device  penetrated the skin, creating cryotherapy to both branches of the infrapatellar saphenous nerve and a third treatment to the anterior femoral cutaneous nerve. 7 punctures of the skin were made to treat the 2 branches of the ISN and another 7 punctures were made to treat the AFCN. There were a total of 3 nerves treated with iovera. The patient tolerated the procedure well with no problems.

## 2019-07-21 NOTE — DISCHARGE INSTRUCTIONS
Post Op Total Knee Arthroplasty Instructions     1. Enteric coated aspirin 81 mg by mouth twice a day for 6 weeks to prevent blood clots,  unless otherwise indicated.  2. Please take a stomach reflux medication such as pepcid, prevacid, nexium (H-2 blocker or PPI) while on aspirin to prevent stomach ulcers. You will be given a prescription for pepcid.  3. Byron stockings should be worn as much as possible for 6 weeks to prevent blood clots.  4. Do not start antibiotics for any suspected infections related to the surgery until evaluated by dr fagan staff  5. No driving for approximately 2-4 weeks  6. You can shower once the incision is completely dry, otherwise place a new dry dressing twice a day if there is drainage. Please call the office if the drainage increases after discharge.  7. You may resume all pre-surgery medications unless otherwise indicated  8. All patients should be seen in Dr Fagan office approx 2 weeks after surgery  9. Dr Bro prefers outpatient physical therapy upon discharge home. If home PT is needed, please contact Dr Bro for approval  10. Patients should see their primary care doctor after discharge home  11. If you are sent to a rehab/nursing facility please notify dr fagan office once discharged.  12. Only lortab or Percocet should be given for pain medications on discharge. You will be given a prescription for 1 pill every 4 hrs as needed for the first 2 weeks. For weeks 2-6 you can receive a prescription for Percocet or lortab 1 pill every 6 hrs as needed. For weeks 6-12 you can receive a prescription for 1 pill every 8 hrs as needed. There will be no pain medications given after 12 weeks. After 12 weeks, you should take Tylenol, motrin, advil, aleve, etc. for pain control.  13. Please take a stomach reflux medication such as pepcid, prevacid, nexium, etc. (H-2 blocker or PPI) while on anti inflammatory medications such as advil, aleve, motrin, etc. to prevent stomach ulcers or  gastrointestinal upset

## 2019-07-22 ENCOUNTER — HOSPITAL ENCOUNTER (OUTPATIENT)
Facility: HOSPITAL | Age: 79
LOS: 1 days | Discharge: HOME | End: 2019-07-22
Attending: ORTHOPAEDIC SURGERY | Admitting: ORTHOPAEDIC SURGERY
Payer: MEDICARE

## 2019-07-22 ENCOUNTER — ANESTHESIA (OUTPATIENT)
Dept: SURGERY | Facility: HOSPITAL | Age: 79
End: 2019-07-22
Payer: MEDICARE

## 2019-07-22 DIAGNOSIS — M17.12 OSTEOARTHRITIS OF LEFT KNEE, UNSPECIFIED OSTEOARTHRITIS TYPE: ICD-10-CM

## 2019-07-22 DIAGNOSIS — Z96.652 S/P TOTAL KNEE REPLACEMENT, LEFT: Primary | ICD-10-CM

## 2019-07-22 DIAGNOSIS — M17.12 PRIMARY OSTEOARTHRITIS OF LEFT KNEE: ICD-10-CM

## 2019-07-22 LAB
APPEARANCE FLD: CLEAR
BASOPHILS # BLD AUTO: 0.01 K/UL (ref 0–0.2)
BASOPHILS NFR BLD: 0.1 % (ref 0–1.9)
BODY FLD TYPE: NORMAL
COLOR FLD: YELLOW
DIFFERENTIAL METHOD: ABNORMAL
EOSINOPHIL # BLD AUTO: 0.1 K/UL (ref 0–0.5)
EOSINOPHIL NFR BLD: 0.7 % (ref 0–8)
ERYTHROCYTE [DISTWIDTH] IN BLOOD BY AUTOMATED COUNT: 15.6 % (ref 11.5–14.5)
HCT VFR BLD AUTO: 38 % (ref 40–54)
HCT VFR BLD AUTO: 38 % (ref 40–54)
HGB BLD-MCNC: 12.5 G/DL (ref 14–18)
HGB BLD-MCNC: 12.5 G/DL (ref 14–18)
LYMPHOCYTES # BLD AUTO: 2 K/UL (ref 1–4.8)
LYMPHOCYTES NFR BLD: 24.1 % (ref 18–48)
LYMPHOCYTES NFR FLD MANUAL: 63 %
MCH RBC QN AUTO: 28.5 PG (ref 27–31)
MCHC RBC AUTO-ENTMCNC: 32.9 G/DL (ref 32–36)
MCV RBC AUTO: 87 FL (ref 82–98)
MONOCYTES # BLD AUTO: 1 K/UL (ref 0.3–1)
MONOCYTES NFR BLD: 12.3 % (ref 4–15)
NEUTROPHILS # BLD AUTO: 4.9 K/UL (ref 1.8–7.7)
NEUTROPHILS NFR BLD: 62.8 % (ref 38–73)
NEUTROPHILS NFR FLD MANUAL: 37 %
PLATELET # BLD AUTO: 200 K/UL (ref 150–350)
PMV BLD AUTO: 9.1 FL (ref 9.2–12.9)
POCT GLUCOSE: 105 MG/DL (ref 70–110)
RBC # BLD AUTO: 4.39 M/UL (ref 4.6–6.2)
WBC # BLD AUTO: 8.2 K/UL (ref 3.9–12.7)
WBC # FLD: 174 /CU MM

## 2019-07-22 PROCEDURE — 63600175 PHARM REV CODE 636 W HCPCS: Performed by: ANESTHESIOLOGY

## 2019-07-22 PROCEDURE — 85025 COMPLETE CBC W/AUTO DIFF WBC: CPT

## 2019-07-22 PROCEDURE — C9290 INJ, BUPIVACAINE LIPOSOME: HCPCS | Performed by: ANESTHESIOLOGY

## 2019-07-22 PROCEDURE — S0020 INJECTION, BUPIVICAINE HYDRO: HCPCS | Performed by: ORTHOPAEDIC SURGERY

## 2019-07-22 PROCEDURE — 27201423 OPTIME MED/SURG SUP & DEVICES STERILE SUPPLY: Performed by: ORTHOPAEDIC SURGERY

## 2019-07-22 PROCEDURE — 36000710: Performed by: ORTHOPAEDIC SURGERY

## 2019-07-22 PROCEDURE — C1776 JOINT DEVICE (IMPLANTABLE): HCPCS | Performed by: ORTHOPAEDIC SURGERY

## 2019-07-22 PROCEDURE — C1713 ANCHOR/SCREW BN/BN,TIS/BN: HCPCS | Performed by: ORTHOPAEDIC SURGERY

## 2019-07-22 PROCEDURE — 25000003 PHARM REV CODE 250: Performed by: STUDENT IN AN ORGANIZED HEALTH CARE EDUCATION/TRAINING PROGRAM

## 2019-07-22 PROCEDURE — 97535 SELF CARE MNGMENT TRAINING: CPT

## 2019-07-22 PROCEDURE — 63600175 PHARM REV CODE 636 W HCPCS: Performed by: STUDENT IN AN ORGANIZED HEALTH CARE EDUCATION/TRAINING PROGRAM

## 2019-07-22 PROCEDURE — 71000015 HC POSTOP RECOV 1ST HR: Performed by: ORTHOPAEDIC SURGERY

## 2019-07-22 PROCEDURE — 25000003 PHARM REV CODE 250: Performed by: NURSE PRACTITIONER

## 2019-07-22 PROCEDURE — 71000033 HC RECOVERY, INTIAL HOUR: Performed by: ORTHOPAEDIC SURGERY

## 2019-07-22 PROCEDURE — 71000039 HC RECOVERY, EACH ADD'L HOUR: Performed by: ORTHOPAEDIC SURGERY

## 2019-07-22 PROCEDURE — 25000003 PHARM REV CODE 250: Performed by: ORTHOPAEDIC SURGERY

## 2019-07-22 PROCEDURE — 97165 OT EVAL LOW COMPLEX 30 MIN: CPT

## 2019-07-22 PROCEDURE — 97116 GAIT TRAINING THERAPY: CPT

## 2019-07-22 PROCEDURE — 37000009 HC ANESTHESIA EA ADD 15 MINS: Performed by: ORTHOPAEDIC SURGERY

## 2019-07-22 PROCEDURE — 64447 NJX AA&/STRD FEMORAL NRV IMG: CPT | Performed by: ANESTHESIOLOGY

## 2019-07-22 PROCEDURE — 36000711: Performed by: ORTHOPAEDIC SURGERY

## 2019-07-22 PROCEDURE — 71000016 HC POSTOP RECOV ADDL HR: Performed by: ORTHOPAEDIC SURGERY

## 2019-07-22 PROCEDURE — 37000008 HC ANESTHESIA 1ST 15 MINUTES: Performed by: ORTHOPAEDIC SURGERY

## 2019-07-22 PROCEDURE — 63600175 PHARM REV CODE 636 W HCPCS

## 2019-07-22 PROCEDURE — 36415 COLL VENOUS BLD VENIPUNCTURE: CPT

## 2019-07-22 PROCEDURE — 97161 PT EVAL LOW COMPLEX 20 MIN: CPT

## 2019-07-22 PROCEDURE — 89051 BODY FLUID CELL COUNT: CPT

## 2019-07-22 DEVICE — FEMORAL NEXGEN LPS OPT SZ G-LT: Type: IMPLANTABLE DEVICE | Site: KNEE | Status: FUNCTIONAL

## 2019-07-22 DEVICE — IMPLANTABLE DEVICE: Type: IMPLANTABLE DEVICE | Site: KNEE | Status: FUNCTIONAL

## 2019-07-22 DEVICE — PATELLA NEXGEN ALL-POLY: Type: IMPLANTABLE DEVICE | Site: KNEE | Status: FUNCTIONAL

## 2019-07-22 RX ORDER — AMOXICILLIN 250 MG
1 CAPSULE ORAL 2 TIMES DAILY
Status: DISCONTINUED | OUTPATIENT
Start: 2019-07-22 | End: 2019-07-22 | Stop reason: HOSPADM

## 2019-07-22 RX ORDER — CEPHALEXIN 500 MG/1
500 CAPSULE ORAL EVERY 6 HOURS
Qty: 4 CAPSULE | Refills: 0 | Status: SHIPPED | OUTPATIENT
Start: 2019-07-22 | End: 2019-07-23

## 2019-07-22 RX ORDER — DEXAMETHASONE SODIUM PHOSPHATE 4 MG/ML
10 INJECTION, SOLUTION INTRA-ARTICULAR; INTRALESIONAL; INTRAMUSCULAR; INTRAVENOUS; SOFT TISSUE ONCE
Status: DISCONTINUED | OUTPATIENT
Start: 2019-07-22 | End: 2019-07-22 | Stop reason: HOSPADM

## 2019-07-22 RX ORDER — OXYCODONE AND ACETAMINOPHEN 5; 325 MG/1; MG/1
1 TABLET ORAL EVERY 12 HOURS PRN
Qty: 28 TABLET | Refills: 0 | Status: SHIPPED | OUTPATIENT
Start: 2019-07-22 | End: 2019-08-05

## 2019-07-22 RX ORDER — CEFAZOLIN SODIUM 2 G/50ML
2 SOLUTION INTRAVENOUS
Status: DISCONTINUED | OUTPATIENT
Start: 2019-07-22 | End: 2019-07-22 | Stop reason: HOSPADM

## 2019-07-22 RX ORDER — HYDROMORPHONE HYDROCHLORIDE 2 MG/ML
0.5 INJECTION, SOLUTION INTRAMUSCULAR; INTRAVENOUS; SUBCUTANEOUS EVERY 5 MIN PRN
Status: DISCONTINUED | OUTPATIENT
Start: 2019-07-22 | End: 2019-07-22 | Stop reason: HOSPADM

## 2019-07-22 RX ORDER — PROPOFOL 10 MG/ML
VIAL (ML) INTRAVENOUS
Status: DISCONTINUED | OUTPATIENT
Start: 2019-07-22 | End: 2019-07-22

## 2019-07-22 RX ORDER — HYDRALAZINE HYDROCHLORIDE 20 MG/ML
10 INJECTION INTRAMUSCULAR; INTRAVENOUS EVERY 10 MIN PRN
Status: COMPLETED | OUTPATIENT
Start: 2019-07-22 | End: 2019-07-22

## 2019-07-22 RX ORDER — TRANEXAMIC ACID 100 MG/ML
INJECTION, SOLUTION INTRAVENOUS
Status: DISCONTINUED | OUTPATIENT
Start: 2019-07-22 | End: 2019-07-22 | Stop reason: HOSPADM

## 2019-07-22 RX ORDER — HYDROCODONE BITARTRATE AND ACETAMINOPHEN 10; 325 MG/1; MG/1
1 TABLET ORAL EVERY 4 HOURS PRN
Status: DISCONTINUED | OUTPATIENT
Start: 2019-07-22 | End: 2019-07-22 | Stop reason: HOSPADM

## 2019-07-22 RX ORDER — POVIDONE-IODINE 10 %
SOLUTION, NON-ORAL TOPICAL
Status: DISCONTINUED | OUTPATIENT
Start: 2019-07-22 | End: 2019-07-22 | Stop reason: HOSPADM

## 2019-07-22 RX ORDER — TRANEXAMIC ACID 650 MG/1
1950 TABLET ORAL ONCE
Status: COMPLETED | OUTPATIENT
Start: 2019-07-22 | End: 2019-07-22

## 2019-07-22 RX ORDER — SODIUM CHLORIDE 0.9 % (FLUSH) 0.9 %
10 SYRINGE (ML) INJECTION
Status: DISCONTINUED | OUTPATIENT
Start: 2019-07-22 | End: 2019-07-22 | Stop reason: HOSPADM

## 2019-07-22 RX ORDER — CEFAZOLIN SODIUM 1 G/3ML
INJECTION, POWDER, FOR SOLUTION INTRAMUSCULAR; INTRAVENOUS
Status: DISCONTINUED | OUTPATIENT
Start: 2019-07-22 | End: 2019-07-22

## 2019-07-22 RX ORDER — ONDANSETRON 2 MG/ML
4 INJECTION INTRAMUSCULAR; INTRAVENOUS EVERY 12 HOURS PRN
Status: DISCONTINUED | OUTPATIENT
Start: 2019-07-22 | End: 2019-07-22 | Stop reason: HOSPADM

## 2019-07-22 RX ORDER — ONDANSETRON 2 MG/ML
8 INJECTION INTRAMUSCULAR; INTRAVENOUS DAILY PRN
Status: DISCONTINUED | OUTPATIENT
Start: 2019-07-22 | End: 2019-07-22 | Stop reason: HOSPADM

## 2019-07-22 RX ORDER — ACETAMINOPHEN 325 MG/1
650 TABLET ORAL EVERY 6 HOURS SCHEDULED
Status: DISCONTINUED | OUTPATIENT
Start: 2019-07-22 | End: 2019-07-22 | Stop reason: HOSPADM

## 2019-07-22 RX ORDER — CELECOXIB 100 MG/1
400 CAPSULE ORAL
Status: COMPLETED | OUTPATIENT
Start: 2019-07-22 | End: 2019-07-22

## 2019-07-22 RX ORDER — ACETAMINOPHEN 325 MG/1
325 TABLET ORAL EVERY 4 HOURS
Qty: 84 TABLET | Refills: 0 | Status: SHIPPED | OUTPATIENT
Start: 2019-07-22 | End: 2019-08-05

## 2019-07-22 RX ORDER — BUPIVACAINE HYDROCHLORIDE 2.5 MG/ML
INJECTION, SOLUTION INFILTRATION; PERINEURAL
Status: DISCONTINUED | OUTPATIENT
Start: 2019-07-22 | End: 2019-07-22 | Stop reason: HOSPADM

## 2019-07-22 RX ORDER — CELECOXIB 100 MG/1
200 CAPSULE ORAL 2 TIMES DAILY
Status: DISCONTINUED | OUTPATIENT
Start: 2019-07-22 | End: 2019-07-22 | Stop reason: HOSPADM

## 2019-07-22 RX ORDER — DICLOFENAC SODIUM 75 MG/1
75 TABLET, DELAYED RELEASE ORAL 2 TIMES DAILY
Qty: 28 TABLET | Refills: 0 | Status: SHIPPED | OUTPATIENT
Start: 2019-07-22 | End: 2019-08-05

## 2019-07-22 RX ORDER — TRANEXAMIC ACID 100 MG/ML
1000 INJECTION, SOLUTION INTRAVENOUS
Status: DISCONTINUED | OUTPATIENT
Start: 2019-07-22 | End: 2019-07-22 | Stop reason: HOSPADM

## 2019-07-22 RX ORDER — PREGABALIN 75 MG/1
150 CAPSULE ORAL
Status: COMPLETED | OUTPATIENT
Start: 2019-07-22 | End: 2019-07-22

## 2019-07-22 RX ORDER — ASPIRIN 81 MG/1
81 TABLET ORAL 2 TIMES DAILY
Qty: 84 TABLET | Refills: 0 | Status: ON HOLD | OUTPATIENT
Start: 2019-07-22 | End: 2023-05-04 | Stop reason: CLARIF

## 2019-07-22 RX ORDER — SODIUM CHLORIDE 9 MG/ML
INJECTION, SOLUTION INTRAVENOUS CONTINUOUS
Status: DISCONTINUED | OUTPATIENT
Start: 2019-07-22 | End: 2019-07-22 | Stop reason: HOSPADM

## 2019-07-22 RX ORDER — FAMOTIDINE 20 MG/1
20 TABLET, FILM COATED ORAL 2 TIMES DAILY
Qty: 84 TABLET | Refills: 0 | Status: ON HOLD | OUTPATIENT
Start: 2019-07-22 | End: 2023-05-04 | Stop reason: CLARIF

## 2019-07-22 RX ORDER — FAMOTIDINE 20 MG/1
20 TABLET, FILM COATED ORAL 2 TIMES DAILY
Status: DISCONTINUED | OUTPATIENT
Start: 2019-07-22 | End: 2019-07-22 | Stop reason: HOSPADM

## 2019-07-22 RX ORDER — ACETAMINOPHEN 500 MG
1000 TABLET ORAL
Status: COMPLETED | OUTPATIENT
Start: 2019-07-22 | End: 2019-07-22

## 2019-07-22 RX ORDER — SODIUM CHLORIDE 0.9 G/100ML
IRRIGANT IRRIGATION
Status: DISCONTINUED | OUTPATIENT
Start: 2019-07-22 | End: 2019-07-22 | Stop reason: HOSPADM

## 2019-07-22 RX ORDER — CEFAZOLIN SODIUM 2 G/50ML
2 SOLUTION INTRAVENOUS ONCE
Status: DISCONTINUED | OUTPATIENT
Start: 2019-07-22 | End: 2019-07-22 | Stop reason: HOSPADM

## 2019-07-22 RX ORDER — MIDAZOLAM HYDROCHLORIDE 1 MG/ML
INJECTION, SOLUTION INTRAMUSCULAR; INTRAVENOUS
Status: DISCONTINUED | OUTPATIENT
Start: 2019-07-22 | End: 2019-07-22

## 2019-07-22 RX ORDER — BUPIVACAINE HYDROCHLORIDE 7.5 MG/ML
INJECTION, SOLUTION EPIDURAL; RETROBULBAR
Status: COMPLETED | OUTPATIENT
Start: 2019-07-22 | End: 2019-07-22

## 2019-07-22 RX ORDER — TRANEXAMIC ACID 100 MG/ML
1000 INJECTION, SOLUTION INTRAVENOUS ONCE
Status: COMPLETED | OUTPATIENT
Start: 2019-07-22 | End: 2019-07-22

## 2019-07-22 RX ORDER — PHENYLEPHRINE HYDROCHLORIDE 10 MG/ML
INJECTION INTRAVENOUS
Status: DISCONTINUED | OUTPATIENT
Start: 2019-07-22 | End: 2019-07-22

## 2019-07-22 RX ORDER — PROPOFOL 10 MG/ML
VIAL (ML) INTRAVENOUS CONTINUOUS PRN
Status: DISCONTINUED | OUTPATIENT
Start: 2019-07-22 | End: 2019-07-22

## 2019-07-22 RX ORDER — OXYCODONE HYDROCHLORIDE 5 MG/1
5 TABLET ORAL
Status: DISCONTINUED | OUTPATIENT
Start: 2019-07-22 | End: 2019-07-22 | Stop reason: HOSPADM

## 2019-07-22 RX ORDER — LIDOCAINE HYDROCHLORIDE 10 MG/ML
1 INJECTION, SOLUTION EPIDURAL; INFILTRATION; INTRACAUDAL; PERINEURAL ONCE
Status: DISCONTINUED | OUTPATIENT
Start: 2019-07-22 | End: 2019-07-22 | Stop reason: HOSPADM

## 2019-07-22 RX ORDER — PREGABALIN 75 MG/1
75 CAPSULE ORAL 2 TIMES DAILY
Status: DISCONTINUED | OUTPATIENT
Start: 2019-07-22 | End: 2019-07-22 | Stop reason: HOSPADM

## 2019-07-22 RX ORDER — SODIUM CHLORIDE, SODIUM LACTATE, POTASSIUM CHLORIDE, CALCIUM CHLORIDE 600; 310; 30; 20 MG/100ML; MG/100ML; MG/100ML; MG/100ML
INJECTION, SOLUTION INTRAVENOUS CONTINUOUS
Status: DISCONTINUED | OUTPATIENT
Start: 2019-07-22 | End: 2019-07-22 | Stop reason: HOSPADM

## 2019-07-22 RX ORDER — BISACODYL 10 MG
10 SUPPOSITORY, RECTAL RECTAL 2 TIMES DAILY PRN
Status: DISCONTINUED | OUTPATIENT
Start: 2019-07-22 | End: 2019-07-22 | Stop reason: HOSPADM

## 2019-07-22 RX ORDER — HYDRALAZINE HYDROCHLORIDE 20 MG/ML
INJECTION INTRAMUSCULAR; INTRAVENOUS
Status: COMPLETED
Start: 2019-07-22 | End: 2019-07-22

## 2019-07-22 RX ADMIN — ONDANSETRON 8 MG: 2 INJECTION INTRAMUSCULAR; INTRAVENOUS at 04:07

## 2019-07-22 RX ADMIN — PREGABALIN 150 MG: 75 CAPSULE ORAL at 08:07

## 2019-07-22 RX ADMIN — HYDRALAZINE HYDROCHLORIDE 10 MG: 20 INJECTION INTRAMUSCULAR; INTRAVENOUS at 03:07

## 2019-07-22 RX ADMIN — PROPOFOL 50 MCG/KG/MIN: 10 INJECTION, EMULSION INTRAVENOUS at 11:07

## 2019-07-22 RX ADMIN — TRANEXAMIC ACID 1000 MG: 100 INJECTION, SOLUTION INTRAVENOUS at 12:07

## 2019-07-22 RX ADMIN — ACETAMINOPHEN 1000 MG: 500 TABLET ORAL at 08:07

## 2019-07-22 RX ADMIN — PHENYLEPHRINE HYDROCHLORIDE 100 MCG: 10 INJECTION INTRAVENOUS at 12:07

## 2019-07-22 RX ADMIN — CELECOXIB 400 MG: 100 CAPSULE ORAL at 08:07

## 2019-07-22 RX ADMIN — TRANEXAMIC ACID 1950 MG: 650 TABLET ORAL at 08:07

## 2019-07-22 RX ADMIN — CEFAZOLIN 2 G: 330 INJECTION, POWDER, FOR SOLUTION INTRAMUSCULAR; INTRAVENOUS at 11:07

## 2019-07-22 RX ADMIN — TRANEXAMIC ACID 1000 MG: 100 INJECTION, SOLUTION INTRAVENOUS at 02:07

## 2019-07-22 RX ADMIN — BUPIVACAINE HYDROCHLORIDE 1.6 ML: 7.5 INJECTION, SOLUTION EPIDURAL; RETROBULBAR at 11:07

## 2019-07-22 RX ADMIN — PROPOFOL 50 MG: 10 INJECTION, EMULSION INTRAVENOUS at 11:07

## 2019-07-22 RX ADMIN — SODIUM CHLORIDE, SODIUM LACTATE, POTASSIUM CHLORIDE, AND CALCIUM CHLORIDE: .6; .31; .03; .02 INJECTION, SOLUTION INTRAVENOUS at 11:07

## 2019-07-22 RX ADMIN — OXYCODONE HYDROCHLORIDE 5 MG: 5 TABLET ORAL at 03:07

## 2019-07-22 RX ADMIN — MIDAZOLAM 2 MG: 1 INJECTION INTRAMUSCULAR; INTRAVENOUS at 11:07

## 2019-07-22 RX ADMIN — PHENYLEPHRINE HYDROCHLORIDE 200 MCG: 10 INJECTION INTRAVENOUS at 12:07

## 2019-07-22 RX ADMIN — HYDRALAZINE HYDROCHLORIDE 10 MG: 20 INJECTION INTRAMUSCULAR; INTRAVENOUS at 02:07

## 2019-07-22 RX ADMIN — BUPIVACAINE 20 ML: 13.3 INJECTION, SUSPENSION, LIPOSOMAL INFILTRATION at 01:07

## 2019-07-22 NOTE — TRANSFER OF CARE
"Anesthesia Transfer of Care Note    Patient: Sly Villarreal    Procedure(s) Performed: Procedure(s) (LRB):  ARTHROPLASTY, KNEE, SIGHT ASSISTED (Left)    Patient location: PACU    Anesthesia Type: spinal and MAC    Transport from OR: Transported from OR on room air with adequate spontaneous ventilation    Post pain: adequate analgesia    Post assessment: no apparent anesthetic complications and tolerated procedure well    Post vital signs: stable    Level of consciousness: awake, alert and oriented    Nausea/Vomiting: no nausea/vomiting    Complications: none    Transfer of care protocol was followed      Last vitals:   Visit Vitals  BP (!) 158/67   Pulse (!) 52   Temp 36.6 °C (97.9 °F) (Skin)   Resp 16   Ht 5' 7" (1.702 m)   Wt 74.4 kg (164 lb)   SpO2 98%   BMI 25.69 kg/m²     "

## 2019-07-22 NOTE — INTERVAL H&P NOTE
The patient has been examined and the H&P has been reviewed:    I concur with the findings and no changes have occurred since H&P was written.    Anesthesia/Surgery risks, benefits and alternative options discussed and understood by patient/family.          Active Hospital Problems    Diagnosis  POA    Osteoarthritis of left knee [M17.12]  Yes     Surgery scheduled with Dr Bro  He reports significant pain and disability from this  He failed NSAIDs and PT         Resolved Hospital Problems   No resolved problems to display.

## 2019-07-22 NOTE — PLAN OF CARE
Problem: Physical Therapy Goal  Goal: Physical Therapy Goal  Goals to be met by: 7/30/19     Patient will increase functional independence with mobility by performing:    Supine <> sit mod INd  Sit <>stand supervision  Ambulate with  feet with CGA/SPVN  Perform LE TE X 15 reps BLE      Outcome: Ongoing (interventions implemented as appropriate)  INitial eval completed. Pt able to functionally ambulate, however safety is limited by his subjective complaints of dizziness

## 2019-07-22 NOTE — H&P
The patient has failed non operative management, has painful crepitus, and has swelling. The natural history of degenerative arthritis was discussed at length and nonoperative and operative treatment was reviewed. Due to the pain and associated disability, I recommend left total knee replacement. The risks, benefits, convalescence, and alternatives were reviewed. Numerous questions were asked and answered. Models were used as an education tool. Surgery will be scheduled at a convenient time. The discussion with the patient included a description of a total knee replacement. A total knee replacement (TKR) means a resurfacing of all three surfaces-the patella, femur, and tibia, with metal and plastic parts. The prosthetic parts are usually cemented into position and will allow a patient a full range of motion from. The postoperative motion, however, is determined by multiple factors, the most important of which is preoperative motion. In general, the better the motion preoperatively, the better the motion postoperatively. In younger patients I will generally use an uncemented tibial component and leave the patella unresurfaced, in an effort to preserve bone stock for any future revision surgeries. In those patients we discuss the risk of anterior knee pain in a small subset of patients (5-10%) with an unresurfaced patella. The operation, pending medical clearance, generally requires a hospitalization of 0-3 days for one knee (three-four days for a bilateral replacement). In general, we prefer to perform the procedure under epidural/spinal anesthesia. Patient blood donation will be based on the individual patient's preoperative hemoglobin/hematocrit levels. The operation will be done preferably in a minimally invasive fashion which will facilitate recovery. While performing the procedure in a minimally invasive manner is our preference, some patients are not candidates. In that situation, a non-minimally invasive  technique will be performed. This will not adversely affect the long term success of the TKR. Postoperatively, the patient is walking the day of surgery and may be discahrged the day of surgery if stable with a walker or cane. The first couple of days are very painful and the pain medication will alleviate but not eliminate the pain. Thus, the patient must really push hard to get the range of motion. The cane is to be dispensed with once the patient is secure enough. In general, there is no cast or brace required with a routine knee replacement. In the long term, we do not encourage our patients to run for the sake of running; although, pending their preoperative status, we often allow patients to play doubles tennis or comparable activities. We also allow them to do gentle intermediate downhill skiing if they are truly an expert skier. Biking is encouraged as well as swimming. The follow-up periods are usually at three weeks, 3 months, six months, and yearly intervals. Potential complications with total knee replacements include infection (less than 2%), which is much higher in patients with obesity, diabetes, or other conditions which adversely affect the immune system. (Patients with a previous history of osteomyelitis can have infection rates as high as 15%). By nerve damage we mean a peroneal nerve palsy with a foot drop (a flapping foot with ambulation). This is particularly more apt to occur with a bad valgus (knock knee) deformity. The incidence can be quite high in this particular patient population. There are always areas of skin numbness, but this is not an untoward effect, nor do we consider it a complication. Other potential complications include dislocation of the patellar component (less than 2%). The loosening of either the tibial or femoral component is much more infrequent. It usually occurs with infection or long-term use in a patient population that is at extreme risk (e.g., markedly overweight  and not conscientious about their exercises). Major blood vessel damage is also extremely rare. Theoretically, because of the anatomic proximity of the popliteal artery, it could be lacerated with subsequent repairs required. Albeit unlikely, a disruption of the popliteal artery could theoretically result in an amputation. Similarly, infection could theoretically result in an amputation if one were to grow out an organism that cannot be controlled with antibiotics. General medical complications include phlebitis for which we prophylactically anticoagulate, but it could still occur and fatal pulmonary embolus has been reported. Cardiovascular problems, such as a heart attack or ischemia, are always a concern with such hemodynamic changes in the blood vascular system. Other general complications are very rare but in medicine anything could theoretically happen. We also discussed performing a pre-operative peripheral sensory block of the bracnhes of the AFCN and ISN of the same knee using iovera to help with pain control post surgery. This is a relatively new technology with early data demonstrating significant pain improvement and functional recovery. However the science defining all the associated risks is still developing. I think the patient understands the risk benefit ratio of total knee replacement and the iovera treatment and would like to pursue the total knee replacement and iovera treatment. we will begin the pre-operative process. Additionally, this case will undergo peer review for appropriateness of care during our departmental weekly indications conference prior to surgery.     There are no changes to the H&P documented above    Kp Lara MD   [facility] Orthopaedic Surgery, PGY-3  Pager: [phone]              Subjective     Chief Complaint: pre-op exam     DAYANA Villarreal is a 79 y.o. male who  has a past medical history of Hypothyroid (2019) and Type 2 diabetes mellitus (2019) last A1c 7 that is  well controlled and BPH. The patient presents to clinic for pre-operative examination.     HPI: I had seen this pleasant 79 y.o. male in the pre-op clinic today prior to his elective left knee arthroplasty with Dr. Bro for his left knee osteoarthritis. He has left knee OA and this is affecting him due to the pain. He also had right knee OA s/p a knee replacement and this was improved with no complications. He is active and walks without CP or SOB. His bp at home is in the 130s systolic and he does not take any medications for his blood pressure except flomax which is used for his BPH. No history of blood clots.      Active Cardiac Conditions: History is not suggestive of unstable coronary syndrome, decompensated heart failure, significant arrhythmias, severe valvular disease.     Exercise Tolerance: Pt can undertake activities such as shopping, flights of stairs, golfing and running without chest pain or shortness of breath making his exercise tolerance more than 4 METs.      Clinical Cardiac Risk Factors: Pt does not have a history of Diabetes Mellitus     Current Anticoagulants and Antiplatelet Therapy: No     Anesthesia  No personal or family history of complications with anesthesia.     Review of Systems   Constitutional: Negative for chills, fever and weight loss.   HENT: Negative for congestion, nosebleeds and sore throat.    Eyes: Negative for blurred vision, photophobia and pain.   Respiratory: Negative for cough, shortness of breath and wheezing.    Cardiovascular: Negative for chest pain and leg swelling.   Gastrointestinal: Negative for abdominal pain, blood in stool, constipation, diarrhea and melena.   Genitourinary: Negative for dysuria and hematuria.   Musculoskeletal: Positive for joint pain. Negative for falls and myalgias.   Skin: Negative for itching and rash.   Neurological: Negative for seizures, loss of consciousness and weakness.   Endo/Heme/Allergies: Does not bruise/bleed easily.  "  Psychiatric/Behavioral: Negative for depression, hallucinations and suicidal ideas.              Past Medical History:   Diagnosis Date    Hypothyroid 2019    Type 2 diabetes mellitus 2019               Past Surgical History:   Procedure Laterality Date    ARTHROPLASTY, KNEE, SIGHT ASSISTED Right 1/28/2019     Performed by [facility] at [facility] OR    CRYOTHERAPY, NERVE, PERIPHERAL, PERCUTANEOUS, USING LIQUID NITROUS OXIDE IN CLOSED NEEDLE DEVICE Right 1/24/2019     Performed by MONTANA Sutherland at [facility] OR    JOINT REPLACEMENT Right       knee         History reviewed. No pertinent family history.     Social History           Tobacco Use    Smoking status: Never Smoker   Substance Use Topics    Alcohol use: No    Drug use: Never         Review of patient's allergies indicates:  No Known Allergies     Objective                                                                                                                                                                                  Vitals:     07/17/19 0858   BP: (!) 151/86   BP Location: Right arm   Patient Position: Sitting   BP Method: Large (Automatic)   Pulse: 69   Temp: 97 °F (36.1 °C)   TempSrc: Oral   Weight: 75.5 kg (166 lb 7.2 oz)   Height: 5' 7" (1.702 m)      Body mass index is 26.07 kg/m².     Physical Exam   Constitutional: He is oriented to person, place, and time and well-developed, well-nourished, and in no distress. No distress.   HENT:   Head: Normocephalic and atraumatic.   Right Ear: External ear normal.   Left Ear: External ear normal.   Mouth/Throat: No oropharyngeal exudate.   Eyes: Pupils are equal, round, and reactive to light. Conjunctivae and EOM are normal.   Cardiovascular: Normal rate, regular rhythm and normal heart sounds. PMI is not displaced. Exam reveals no friction rub.   Pulmonary/Chest: Effort normal and breath sounds normal. Chest wall is not dull to percussion.   Abdominal: Soft. Bowel sounds are normal. There " is no hepatosplenomegaly. There is no tenderness.   Musculoskeletal: He exhibits no edema, tenderness or deformity.   Decreased flexion and extension of left knee   Neurological: He is alert and oriented to person, place, and time. He has normal strength. Gait normal. Coordination normal.   Skin: Skin is warm, dry and intact. No rash noted. He is not diaphoretic. No cyanosis. Nails show no clubbing.   Healed scar on right knee. Varicose veins on lower legs    Psychiatric: Mood, memory and affect normal.         Laboratory:        Lab Results   Component Value Date     WBC 10.34 07/10/2019     HGB 14.1 07/10/2019     HCT 43.2 07/10/2019     MCV 86 07/10/2019      07/10/2019        Chemistry               Component Value Date/Time      07/10/2019 0702     K 4.1 07/10/2019 0702      07/10/2019 0702     CO2 29 07/10/2019 0702     BUN 34 (H) 07/10/2019 0702     CREATININE 1.1 07/10/2019 0702      (H) 07/10/2019 0702               Component Value Date/Time     CALCIUM 9.7 07/10/2019 0702     ALKPHOS 65 07/10/2019 0702     AST 22 07/10/2019 0702     ALT 34 07/10/2019 0702     BILITOT 0.7 07/10/2019 0702     ESTGFRAFRICA >60 07/10/2019 0702     EGFRNONAA >60 07/10/2019 0702             No results found for: MG, PHOS  No results found for: CHOL, HDL, LDLCALC, TRIG  No results found for: TSH        Lab Results   Component Value Date     HGBA1C 7.0 (H) 07/10/2019      I reviewed the most recent lab tests and pertinent recent labs.     Assessment/Plan                                                                                                                                                                 Sly Villarreal is a 79 y.o. male who presents to clinic for:     1. Primary osteoarthritis of left knee    2. Preop examination    3. Primary osteoarthritis of right knee    4. Type 2 diabetes mellitus with complication, without long-term current use of insulin    5. Hypothyroidism, unspecified  type    6. Benign prostatic hyperplasia, unspecified whether lower urinary tract symptoms present    7. Gastroesophageal reflux disease, esophagitis presence not specified               Problem List Items Addressed This Visit                 Renal/      BPH (benign prostatic hyperplasia)      Current Assessment & Plan        Continue flomax                   Endocrine      Type 2 diabetes mellitus, without long-term current use of insulin      Overview              Lab Results   Component Value Date     HGBA1C 7.0 (H) 07/10/2019            Lab Results   Component Value Date     CREATININE 1.1 07/10/2019   On metformin                  Current Assessment & Plan        Continue metformin  Stable and controlled.               Hypothyroidism      Overview        On synthroid  TSH WNL at PCP            Current Assessment & Plan        Continue synthroid   Last TSH WNL per patient                   GI      GERD (gastroesophageal reflux disease)      Current Assessment & Plan        Controlled continue pepcid                  Orthopedic      Primary osteoarthritis of right knee      Current Assessment & Plan        Patient had a past knee replacement and it is stable             Osteoarthritis of left knee - Primary      Overview        Surgery scheduled with Dr Bro  He reports significant pain and disability from this  He failed NSAIDs and PT                   Other      Preop examination      Current Assessment & Plan        MET score 8.2  METS>4 patient is medically optimized for surgery                          Medication List with Changes/Refills   Current Medications     BUMETANIDE (BUMEX) 1 MG TABLET    Take 1 tablet (1 mg total) by mouth every morning.     CHOLECALCIFEROL, VITAMIN D3, 50,000 UNIT CAPSULE    Take 1 capsule by mouth once weekly.     ERGOCALCIFEROL (ERGOCALCIFEROL) 50,000 UNIT CAP    Take 1 capsule (50,000 Units total) by mouth every 7 days.     FAMOTIDINE (PEPCID) 20 MG TABLET    Take 1 tablet (20  mg total) by mouth 2 (two) times daily.     LEVOTHYROXINE (SYNTHROID) 25 MCG TABLET    Take 25 mcg by mouth once daily.     MELOXICAM (MOBIC) 15 MG TABLET    TAKE 1 TABLET BY MOUTH DAILY WITH FOOD  FOR PAIN     METFORMIN (GLUCOPHAGE) 500 MG TABLET    Take 500 mg by mouth 2 (two) times daily with meals.     TAMSULOSIN (FLOMAX) 0.4 MG CAP    Take 1 capsule (0.4 mg total) by mouth once daily.     TIMOLOL MALEATE 0.5% (TIMOPTIC) 0.5 % DROP    INSTILL 1 DROP INTO EACH EYE TWICE DAILY      Per ACC/AHA criselda-operative guidelines, moderate risk surgery and METS >=4 is low risk for MACE and no further cardiac testing is required.      Per evaluation, patient is moderate risk for a moderate risk surgery. Patient is  medically optimized for surgery at this time. Instructed as above on criselda-operative medication recommendations and further risk reduction. All questions answered.      Follow up in about 3 months (around 10/17/2019). Follow up for further workup and reassessment or sooner as needed.     Patient expressed understanding after counseling regarding diagnosis and recommendations.

## 2019-07-22 NOTE — ANESTHESIA PROCEDURE NOTES
Spinal    Diagnosis: Left knee  Patient location during procedure: OR  Start time: 7/22/2019 11:21 AM  Timeout: 7/22/2019 11:20 AM  End time: 7/22/2019 11:25 AM    Staffing  Authorizing Provider: Dao Hernández MD  Performing Provider: Dao Hernández MD    Preanesthetic Checklist  Completed: patient identified, site marked, surgical consent, pre-op evaluation, timeout performed, IV checked, risks and benefits discussed and monitors and equipment checked  Spinal Block  Patient position: sitting  Prep: ChloraPrep  Patient monitoring: heart rate, cardiac monitor, continuous pulse ox and frequent blood pressure checks  Approach: midline  Location: L3-4  Injection technique: single shot  CSF Fluid: clear free-flowing CSF  Needle  Needle type: pencil-tip   Needle gauge: 24 G  Needle length: 3.5 in  Additional Documentation: incremental injection and negative aspiration for heme  Needle localization: anatomical landmarks  Assessment  Sensory level: T9   Dermatomal levels determined by pinch or prick  Ease of block: easy  Patient's tolerance of the procedure: comfortable throughout block and no complaints

## 2019-07-22 NOTE — PLAN OF CARE
07/22/19 0950   COX Message   Medicare Outpatient and Observation Notification regarding financial responsibility Given to patient/caregiver;Explained to patient/caregiver;Signed/date by patient/caregiver   Date COX was signed 07/22/19   Time COX was signed 0941

## 2019-07-22 NOTE — PLAN OF CARE
Problem: Occupational Therapy Goal  Goal: Occupational Therapy Goal  Goals to be met by: 08/22     Patient will increase functional independence with ADLs by performing:    Grooming while standing at sink with Supervision.  Toileting from toilet with Supervision for hygiene and clothing management.   Toilet transfer to toilet with Supervision.  Increased functional strength to WFL for ADLs.    Outcome: Ongoing (interventions implemented as appropriate)  Pt found in supine & agreeable to OT/PT co-eval/tx this PM. Pt lives w/ dgtrs in 2SH w/ 0STE; bedroom & t/s combo on 2nd level. PLOF: (I) w/o amb DME w/ all fxnl tasks incl standing tub t/f's, light IADLs & driving. Pt s/p R TKR & reported 4/10 new L TKR pain. Pt perf the following: don undergarments in supine w/ Mod A via nick dress tech; sup-->EOB w/ SBA; don button down w/ Min A; standing via RW w/ CGA; amb via RW w/in & outside room w/ CGA & vc's; toilet t/f w/ CG-Min A; toileting w/ Min A for clothing mgmt. Pt became dizzy w/ N/V at EOB. Pt returned to supine w/ Mod A for LEs, but cont to c/o dizziness. Gave pt/fly option for pt to remain in hosp O/N. Fly agreeable, but pt refused. Edu/tx re: edema/pain mgmt, safe/proper DME use, nick LBD techs, general safety techs, car t/f's & HEP. Pt/fly verbalized understanding.    Pt presents w/ decreased overall endurance/conditioning, balance/mobility & coordination w/ subsequent decline in (I)/safety w/ BADLs, fxnl mobility & fxnl t/f's.  OT 5x/wk to increase phys/fxnl status & maximize potential to achieve established goals for d/c-->home w/ out-pt PT & no DME recs.

## 2019-07-22 NOTE — PROGRESS NOTES
POST OP NOTE    Patient tolerated the procedure well. No complications. Awake from anesthesia with pain controled.      Vitals:    07/22/19 1345   BP: (!) 152/65   Pulse: (!) 50   Resp: 16   Temp:        Lab Results   Component Value Date    HGB 14.1 07/10/2019    HGB 12.1 (L) 01/28/2019     Lab Results   Component Value Date    HCT 43.2 07/10/2019    HCT 37.8 (L) 01/28/2019       Post OP films:  Ordered, not yet complete    Exam:    Surgical dressing clean dry intact.  No saturation or strike through  No calf tenderness to palpation  No pain with passive stretch of toes  SILT Sa/Olivo/DP/SP/T  EHL/FHL/TA/GSC intact  Foot WWP, palpable DP      A/P:  79 male S/P left TKA     - transfer from PACU to home.  - Physical therapy eval and treat   - continue appropriate postop medical care per orders    Disposition pending progression with physical therapy. Prescriptions in chart.     Kp Lara MD   [facility] Orthopaedic Surgery, PGY-3  Pager: [phone]

## 2019-07-22 NOTE — PLAN OF CARE
Pt states name and . Verified to armband. Pt verifies procedure to be done. Verified to consent x2 and EPIC chart. Placed on cardiac monitor x3, and pulse ox. Time out done. Nerve block started.     1333 VSS. Nerve block complete. Cont to monitor.

## 2019-07-22 NOTE — PLAN OF CARE
"VSS. BP better. Currently 134/71. Denies pain or discomfort @ this time. "Ok to release to room", per Dr Duran. Report called to EMIR Silva, with time allotted for questions.   "

## 2019-07-22 NOTE — PT/OT/SLP EVAL
Physical Therapy Evaluation& Discharge    Goals written initially with anticipation of patient choosing to stay overnight, however it was noted that patient chose to discharge to home with family. No further acute therapy.    Patient Name:  Sly Villarreal   MRN:  [id number]    Recommendations:     Discharge Recommendations:  outpatient PT   Discharge Equipment Recommendations: none   Barriers to discharge: pt's subjective complaints of dizziness    Assessment:     Sly Villarreal is a 79 y.o. male admitted with a medical diagnosis of <principal problem not specified>.  He presents with the following impairments/functional limitations:  impaired cardiopulmonary response to activity, gait instability, impaired functional mobilty, impaired balance, impaired self care skills, decreased lower extremity function, decreased ROM, impaired skin, edema, orthopedic precautions, pain, decreased safety awareness . Pt was independent prior to this surgery, had R TKA done in January of 2019 and recovered well.  Patient tolerated 40 feet of ambulation with CGA and cueing however became dizzy and vomited. .    Rehab Prognosis: Good; patient would benefit from acute skilled PT services to address these deficits and reach maximum level of function.    Recent Surgery: Procedure(s) (LRB):  ARTHROPLASTY, KNEE, SIGHT ASSISTED (Left) Day of Surgery    Plan:     During this hospitalization, patient to be seen   to address the identified rehab impairments via gait training, therapeutic activities, therapeutic exercises and progress toward the following goals:    · Plan of Care Expires:       Subjective     Chief Complaint: dizziness that became worse over course of treatment   Patient/Family Comments/goals: patient would still like to go home; also expressed need to urinate prior to ambulation  Pain/Comfort:  · Pain Rating 1: 4/10  · Location - Side 1: Left  · Location - Orientation 1: generalized  · Location 1: knee  · Pain Addressed 1:  Pre-medicate for activity, Cessation of Activity, Reposition, Nurse notified, Distraction  · Pain Rating Post-Intervention 1: 5/10    Patients cultural, spiritual, Samaritan conflicts given the current situation: no    Living Environment:  Lives in a 2SH with his daughter and NASIM, bed / bath upstairs, 1/2 bath down. Ramp entry; WIll stay downstairs for recovery.   Prior to admission, patients level of function was modified independent.  Equipment used at home: walker, rolling, shower chair, cane, straight.  Upon discharge, patient will have assistance from daughters and NASIM.    Objective:     Communicated with EMIR Swanson prior to session.  Patient found supine with peripheral IV  upon PT entry to room.    General Precautions: Standard, fall   Orthopedic Precautions:LLE weight bearing as tolerated   Braces: N/A     Exams:  · Gross Motor Coordination:  WFL  · Postural Exam:  Patient presented with the following abnormalities:    · -       Rounded shoulders  · -       Forward head  · Skin Integrity/Edema:      · -       aquacel dressing over surgical incision  L knee  · Strength / ROM WFL RLE; able to SLR on L, knee flexed to 80 degrees in sitting EOB. Noted to favor L foot in supinated inverted positioning, with gait mostly on lateral foot during stance phase    Functional Mobility:  · Bed Mobility:     · Supine to Sit: stand by assistance and contact guard assistance  · Sit to Supine: moderate assistance  · Transfers:     · Sit to Stand:  contact guard assistance with rolling walker  · Gait: Patient ambulated 40 feet with RW and CGA with TC/VC for walker placement and sequencing. Daughter indicating that pt ambulated similarly prior to knee surgery, describing his gait as compensatory  · Balance: Fair with use of RW / CGA      Therapeutic Activities and Exercises:   Son in law assisted patient with dressing after being provided with cues on proper technique. Pt wanted to go to bathroom but adamantly refused  presence of therapists while in bathroom, pt also did not allow NASIM in BR. Patient however was able to complete toileting task on his own as staff remained just outside door.  Upon returning to [facility], pt complained of nausea and requested emesis bag. Pt vomiting phlegm. Nurse notified and brought meds and sprite    AM-PAC 6 CLICK MOBILITY  Total Score:17     Patient left HOB elevated with all lines intact, call button in reach and RN notified.    GOALS:   Multidisciplinary Problems     Physical Therapy Goals        Problem: Physical Therapy Goal    Goal Priority Disciplines Outcome Goal Variances Interventions   Physical Therapy Goal     PT, PT/OT Ongoing (interventions implemented as appropriate)     Description:  Goals to be met by: 7/30/19     Patient will increase functional independence with mobility by performing:    Supine <> sit mod INd  Sit <>stand supervision  Ambulate with  feet with CGA/SPVN  Perform LE TE X 15 reps BLE                        History:     Past Medical History:   Diagnosis Date    Hypothyroid 2019    Type 2 diabetes mellitus 2019       Past Surgical History:   Procedure Laterality Date    ARTHROPLASTY, KNEE, SIGHT ASSISTED Right 1/28/2019    Performed by Marcus Bro MD at [facility] OR    CRYOTHERAPY, NERVE, PERIPHERAL, PERCUTANEOUS, USING LIQUID NITROUS OXIDE IN CLOSED NEEDLE DEVICE Right 1/24/2019    Performed by MONTANA Sutherland at [facility] OR    JOINT REPLACEMENT Right     knee       Time Tracking:     PT Received On: 07/22/19  PT Start Time: 1625     PT Stop Time: 1705  PT Total Time (min): 40 min     Billable Minutes: Evaluation 30 Gait Training 10 (co eval with OT)      Nanette Dubois, PT  07/22/2019

## 2019-07-22 NOTE — OP NOTE
Procedure Note Landon TKA:      DATE OF PROCEDURE:7/22/2019     PREOPERATIVE DIAGNOSIS: left knee bone-on-bone osteoarthritis.     POSTOPERATIVE DIAGNOSIS: left knee bone-on-bone osteoarthritis.     PROCEDURE: Computer-assisted left total knee arthroplasty with resurfaced patella.     ATTENDING SURGEON: Marcus Bro M.D.   ASSISTANT: Dr. Dickerson, and medical student     Risk Adjustment: Please see media tab for any additional diagnoses faxed from my office related to theTKA.    COMPLICATIONS: None.     IMPLANTS:   1. landon next gen tibial tray, size 5  2. landon next gen Femoral component, size G   left  3. landon next gen all-poly patella, size 32    4. landon next gen posterior stabilized highly crosslinked  polyethylene, 12 mm height.   5. antibiotic bone cement x2     INDICATIONS FOR PROCEDURE: This is a 79 y.o. male with longstanding knee pain. They have failed nonoperative management including injections. Risks and benefits of total knee arthroplasty were explained to the patient. The patient agreed to move forward with total knee arthroplasty.     FINDINGS: The patient had a complete articular cartilage loss down to subchondral bone throughout the knee.     PROCEDURE IN DETAIL: The patient had adductor nerve block prior to entry to the OR. The patient was then brought to the Operating Room and spinal anesthesia started without any difficulties. The patient was placed supine on the operative table. Long catheter was then placed and removed at the end of the case. Right knee was then prepped and draped in sterile fashion. Prior to incision, proper site and procedure as well as antibiotic administration was verified. AFCN and ISN nerves were then injected with marcaine. Anterior midline incision was created overlying center patella proximally to the medial border of the tibial tubercle distally. Skin, subcutaneous fat and deep fascial layer were sharply incised until we came to extensor mechanism retinaculum.  Flap was then elevated medially to VMO and laterally to lateral border of patella. Knee was then aspirated and synovial fluid sent for cell count. Medial parapatellar arthrotomy was injected with Marcaine with epinephrine and a medial parapatellar arthrotomy was then created. Synovium overlying the anterolateral distal femur was then excised as well as the infrapatellar tendon fat pad. Sleeve of soft tissue was elevated off the proximal medial tibia. Periphery of the patella was denervated using bovie cautery, Hohmann retractors then placed medially and laterally along the distal femur to protect the collateral ligaments. ACL and anterior horn of lateral meniscus were then transected. We then pinned our navigation tracker on to distal femur and then performed our anatomy survey for the femur. We placed distal femoral cutting guide such that we were perpendicular to mechanical axis, aiming for about 1 degree of flexion and about 9 mm of bony resection. Distal femur was then resected. Next, we placed AP sizing guide on distal femur and measured for a size___ femoral component. We then luis out Whitesides line and placed our AP cutting block such that we were perpendicular to Whitesides line and created 2 pin holes in 3  degrees of external rotation relative to the posterior condylar axis.being parallel to transepicondylar axis and perpidicualar to deloress line. Resected posterior femoral bone measured approx. 3 mm in difference with the lateral piece thinner than the medial piece. Next, we subluxed the tibia forward and resected medial and lateral menisci. We then pinned our navigation tracker on to the proximal tibia and then performed our anatomy survey for tibia. We placed our proximal tibial cutting guide such that we were perpendicular to mechanical axis, aiming for about 1 to 2 mm of bony resection medially and about 3 degrees posterior slope. Proximal tibial bone was then resected and detached from  posterior soft tissues using Bovie cautery. Next, with knee at 90 degrees, we placed a laminar  in lateral compartment and resected the ACL and the PCL as well as small osteophytes posteriorly along the femur. We then injected the posterior capsule with marcaine. We then assessed our gaps using a 10 mm spacer block. With a 10 mm spacer block, the knee came out to full extension with nice stability but was tight medially w varus and valgus stress, and nice symmetry between flexion and extension. We assessed our tibial cut and our alignment shantelle fell within the center of the ankle. I then performed a medial release using a rust elevator of the MCL along the proximal aspect of the medial tibia. We then re assessed our gaps w 1 2 12 mm spacer block and had nice symmetry w varus/valgus stress. Once we were happy with soft tissue sleeves and bony cuts, we then placed tibial protector on the proximal tibia and our chamfer cutting guide for the femur. We then created our chamfer cuts. We then placed our box cutting guide as lateral as possible while maintaining a symmetric condylar resection and reamed for our box cut. Next, we subluxed the tibia forward, and sized our proximal tibia for a size 5 baseplate, the center of which was rotated such that it was in line with medial third tibial tubercle. This was then pinned in place. We then trialed using a 12 trial polyethylene. With 12 mm polyethylene, the knee came out to full extension. We had nice stability with varus and valgus stress and nice symmetry between flexion and extension. Patella tracked centrally within trochlear groove. We then everted our patella and measured our patellar  thickness for 22 mm. We then resected down to approximately 14 mm of remaining bone. We then sized patella for a 32 patellar button. Three peg holes were then drilled for the patellar button and a patellar trial was then placed. We then assessed our tracking. Patella tracked centrally  within trochlear groove. Once we were happy with all our trial components, we then removed all our trial components, except the tibial baseplate. We then reamed and broached for the tibial baseplate. We then drilled the sclerotic bone along the tibia using a short (4mm) drill bit as well. We then placed the knee in extension and examined the posterior aspect of knee for bleeding. Once we achieve hemostasis, we then packed the knee and inflated the tourniquet. We then prepared our bony surfaces for cementation using pulse lavage antibiotic saline. Femoral component cement was then placed using a pressurized cement gun and then the femoral component impacted and all excess bony cement removed from the periphery and box. Cement gun was then used to cement the tibial bone and then the tibial component was impacted using direct impaction and all excess bony cement was removed from the periphery.We then reduced the knee, everted the patella and cemented the patellar component on last. We then paused to allow for cement to harden. Once cement was hardened, we then let the tourniquet down and reexamined our gaps, stability and tracking; all of which remained unchanged. We then copiously irrigated the knee with pulse lavage betadine saline. We then closed our medial parapatellar arthrotomy with a running #2 barbed suture, subcutaneous deep fascial layer was closed with simple interrupted # 1 vicryl suture, subcutaneous skin with 2-0 Vicryl and incision with Dermabond and Steri-Strips, was then dressed with silver water proof dressing. The patient was then transferred to Recovery Room bed in stable condition.

## 2019-07-22 NOTE — PLAN OF CARE
Dr Sam notified pt denies pain or discomfort  @ this time, but BP keeps rising. States to give Hydralazine 10 mg IV x1, may repeat in 10 minutes if SBP remains above 180. TORB.

## 2019-07-22 NOTE — PT/OT/SLP EVAL
Occupational Therapy   Evaluation/tx    Name: Sly Villarreal  MRN: [id number]  Admitting Diagnosis:  The primary encounter diagnosis was S/P total knee replacement, left. Diagnoses of Primary osteoarthritis of left knee and Osteoarthritis of left knee, unspecified osteoarthritis type were also pertinent to this visit.   Day of Surgery    Recommendations:     Discharge Recommendations: home, outpatient PT  Discharge Equipment Recommendations:  none  Barriers to discharge:  None    Assessment:   Pt presents w/ decreased overall endurance/conditioning, balance/mobility & coordination w/ subsequent decline in (I)/safety w/ BADLs, fxnl mobility & fxnl t/f's.  OT 5x/wk to increase phys/fxnl status & maximize potential to achieve established goals for d/c-->home w/ out-pt PT & no DME recs.    Sly Villarreal is a 79 y.o. male with a medical diagnosis of The primary encounter diagnosis was S/P total knee replacement, left. Diagnoses of Primary osteoarthritis of left knee and Osteoarthritis of left knee, unspecified osteoarthritis type were also pertinent to this visit.  .  He presents with . Performance deficits affecting function: weakness, impaired endurance, gait instability, impaired functional mobilty, impaired self care skills, impaired balance, decreased lower extremity function, decreased coordination, decreased safety awareness, pain, decreased ROM, impaired skin, edema.      Rehab Prognosis: Good; patient would benefit from acute skilled OT services to address these deficits and reach maximum level of function.       Plan:     Patient to be seen 5 x/week to address the above listed problems via self-care/home management, therapeutic exercises, therapeutic activities  · Plan of Care Expires: 08/22/19  · Plan of Care Reviewed with: patient, son, daughter    Subjective     Chief Complaint: L knee pain  Patient/Family Comments/goals: to go home    Occupational Profile:  Living Environment: w/ dgtrs in 2SH w/ 0STE;  bedroom & t/s combo on 2nd level  Previous level of function: (I) w/o amb DME  Roles and Routines: light IADLs, driving, standing tub t/f's  Equipment Used at Home:  walker, rolling, cane, straight, shower chair, grab bar  Assistance upon Discharge: fly    Pain/Comfort:  · Pain Rating 1: 4/10  · Location - Side 1: Left  · Location 1: knee  · Pain Addressed 1: Pre-medicate for activity, Reposition, Distraction  · Pain Rating Post-Intervention 1: (unstated)    Patients cultural, spiritual, Yarsanism conflicts given the current situation:      Objective:     Communicated with: kenny prior to session.  Patient found HOB elevated with peripheral IV upon OT entry to room.    General Precautions: Standard, fall   Orthopedic Precautions:LLE weight bearing as tolerated   Braces: N/A     Occupational Performance:    Bed Mobility:    · Patient completed Supine to Sit with stand by assistance  · Patient completed Sit to Supine with moderate assistance and with leg lift    Functional Mobility/Transfers:  · Patient completed Sit <> Stand Transfer with contact guard assistance  with  rolling walker   · Patient completed Toilet Transfer Step Transfer technique with contact guard assistance with  rolling walker  · Functional Mobility: via RW w/in & outside room w/ CGA & vc's    Activities of Daily Living:  · Upper Body Dressing: minimum assistance button down  · Lower Body Dressing: moderate assistance don undergarments via nick tech in supine  · Toileting: moderate assistance clothing mgmt    Cognitive/Visual Perceptual:  Grossly WFL    Physical Exam:  BUEs WFL at 4+/5    Sit balance: F to F+  Stand balance: F- to F    AMPAC 6 Click ADL:  AMPAC Total Score: 17    Treatment & Education:  Pt found in supine & agreeable to OT/PT co-eval/tx this PM. Pt lives w/ dgtrs in 2SH w/ 0STE; bedroom & t/s combo on 2nd level. PLOF: (I) w/o amb DME w/ all fxnl tasks incl standing tub t/f's, light IADLs & driving. Pt s/p R TKR & reported 4/10 new L  TKR pain. Pt perf the following: don undergarments in supine w/ Mod A via nick dress tech; sup-->EOB w/ SBA; don button down w/ Min A; standing via RW w/ CGA; amb via RW w/in & outside room w/ CGA & vc's; toilet t/f w/ CG-Min A; toileting w/ Min A for clothing mgmt. Pt became dizzy w/ N/V at EOB. Pt returned to supine w/ Mod A for LEs, but cont to c/o dizziness. Gave pt/fly option for pt to remain in hosp O/N. Fly agreeable, but pt refused. Edu/tx re: edema/pain mgmt, safe/proper DME use, nick LBD techs, general safety techs, car t/f's & HEP. Pt/fly verbalized understanding.    Patient left HOB elevated with all lines intact, call button in reach, nsg notified and fly present    GOALS:   Multidisciplinary Problems     Occupational Therapy Goals        Problem: Occupational Therapy Goal    Goal Priority Disciplines Outcome Interventions   Occupational Therapy Goal     OT, PT/OT Ongoing (interventions implemented as appropriate)    Description:  Goals to be met by: 08/22     Patient will increase functional independence with ADLs by performing:    Grooming while standing at sink with Supervision.  Toileting from toilet with Supervision for hygiene and clothing management.   Toilet transfer to toilet with Supervision.  Increased functional strength to WFL for ADLs.                      History:     Past Medical History:   Diagnosis Date    Hypothyroid 2019    Type 2 diabetes mellitus 2019       Past Surgical History:   Procedure Laterality Date    ARTHROPLASTY, KNEE, SIGHT ASSISTED Right 1/28/2019    Performed by Marcus Bro MD at [facility] OR    CRYOTHERAPY, NERVE, PERIPHERAL, PERCUTANEOUS, USING LIQUID NITROUS OXIDE IN CLOSED NEEDLE DEVICE Right 1/24/2019    Performed by MONTANA Sutherland at [facility] OR    JOINT REPLACEMENT Right     knee       Time Tracking:     OT Date of Treatment: 07/22/19  OT Start Time: 1635  OT Stop Time: 1705  OT Total Time (min): 30 min    Billable Minutes:Evaluation 10  Self Care/Home  Management 20  Total Time 30    JACKIE Tidwell  7/22/2019

## 2019-07-23 ENCOUNTER — TELEPHONE (OUTPATIENT)
Dept: ORTHOPEDICS | Facility: CLINIC | Age: 79
End: 2019-07-23

## 2019-07-23 ENCOUNTER — CLINICAL SUPPORT (OUTPATIENT)
Dept: REHABILITATION | Facility: HOSPITAL | Age: 79
End: 2019-07-23
Payer: MEDICARE

## 2019-07-23 VITALS
WEIGHT: 164 LBS | TEMPERATURE: 97 F | SYSTOLIC BLOOD PRESSURE: 117 MMHG | HEIGHT: 67 IN | BODY MASS INDEX: 25.74 KG/M2 | DIASTOLIC BLOOD PRESSURE: 58 MMHG | HEART RATE: 87 BPM | OXYGEN SATURATION: 99 % | RESPIRATION RATE: 16 BRPM

## 2019-07-23 DIAGNOSIS — M25.562 ACUTE PAIN OF LEFT KNEE: ICD-10-CM

## 2019-07-23 DIAGNOSIS — Z74.09 DECREASED MOBILITY AND ENDURANCE: ICD-10-CM

## 2019-07-23 DIAGNOSIS — R53.1 DECREASED STRENGTH: ICD-10-CM

## 2019-07-23 PROCEDURE — G8978 MOBILITY CURRENT STATUS: HCPCS | Mod: CL,PN

## 2019-07-23 PROCEDURE — 97161 PT EVAL LOW COMPLEX 20 MIN: CPT | Mod: PN

## 2019-07-23 PROCEDURE — G8979 MOBILITY GOAL STATUS: HCPCS | Mod: CK,PN

## 2019-07-23 NOTE — ANESTHESIA POSTPROCEDURE EVALUATION
Anesthesia Post Evaluation    Patient: Sly Villarreal    Procedure(s) Performed: Procedure(s) (LRB):  ARTHROPLASTY, KNEE, SIGHT ASSISTED (Left)    Final Anesthesia Type: spinal  Patient location during evaluation: PACU  Patient participation: Yes- Able to Participate  Level of consciousness: awake and alert, oriented and awake  Post-procedure vital signs: reviewed and stable  Pain management: adequate  Airway patency: patent  PONV status at discharge: No PONV  Anesthetic complications: no      Cardiovascular status: blood pressure returned to baseline  Respiratory status: unassisted and room air  Hydration status: euvolemic  Follow-up not needed.          Vitals Value Taken Time   /58 7/22/2019  5:20 PM   Temp 36.1 °C (97 °F) 7/22/2019  3:35 PM   Pulse 87 7/22/2019  5:20 PM   Resp 16 7/22/2019  5:20 PM   SpO2 99 % 7/22/2019  5:20 PM         Event Time     Out of Recovery 15:30:46          Pain/Florencio Score: Pain Rating Prior to Med Admin: 5 (7/22/2019  3:39 PM)  Florencio Score: 10 (7/22/2019  5:29 PM)

## 2019-07-23 NOTE — ANESTHESIA PROCEDURE NOTES
Peripheral Block    Patient location during procedure: post-op   Block not for primary anesthetic.  Reason for block: at surgeon's request and post-op pain management   Post-op Pain Location: left knee  Start time: 7/22/2019 1:25 PM  Timeout: 7/22/2019 1:25 PM   End time: 7/22/2019 1:35 PM    Staffing  Authorizing Provider: Jose Mendoza MD  Performing Provider: Jose Mendoza MD    Preanesthetic Checklist  Completed: patient identified, site marked, surgical consent, pre-op evaluation, timeout performed, IV checked, risks and benefits discussed and monitors and equipment checked  Peripheral Block  Patient position: supine  Prep: ChloraPrep  Patient monitoring: heart rate, cardiac monitor, continuous pulse ox and frequent blood pressure checks  Block type: adductor canal, I PACK and lateral femoral cutaneous  Laterality: left  Injection technique: single shot  Needle  Needle type: Stimuplex   Needle gauge: 21 G  Needle length: 4 in  Needle localization: anatomical landmarks and ultrasound guidance   -ultrasound image captured on disc.  Assessment  Injection assessment: negative aspiration, negative parasthesia and local visualized surrounding nerve  Paresthesia pain: none  Heart rate change: no  Slow fractionated injection: yes

## 2019-07-23 NOTE — PLAN OF CARE
[facility]  Physical Therapy Initial Evaluation    Name: Sly Villarreal  Clinic Number: [id number]    Therapy Diagnosis:   Encounter Diagnoses   Name Primary?    Acute pain of left knee     Decreased strength     Decreased mobility and endurance      Physician: Marcus Bro MD    Physician Orders: PT Eval and Treat  Medical Diagnosis from Referral: M17.12 (ICD-10-CM) - Primary osteoarthritis of left knee  Evaluation Date: 7/23/2019  Authorization Period Expiration: 12/31/2019  Plan of Care Expiration: 9/17/19  Visit # / Visits authorized: 1/50  FOTO: 1/10    Gcode: 1/10  Visit:  82.88  Total: 1057.88    Time In: 1:30  Time Out: 2:00  Total Billable Time: 30 minutes (1 LCE)    Precautions: Standard, DMII, past R TKA    Subjective     Date of onset: DOS: 7/22/19  History of current condition - Sly reports: he had a left TKA on 7/22/19 2/2 to L knee OA. He is here with his daughter whom is assisting in translation as pt can understand and speak some English. Pt is living with family including daughter in 2 story home but a bed has been made for him downstairs. He arrived about 30 minutes late today due to L knee pain that was complicated from nausea and vomiting since the surgery. He has the same response to anesthesia from his R TKA earlier this year that is doing very well. Pt agreeable to session and daughter assisted in translation and filling out outcomes measures.       Past Medical History:   Diagnosis Date    Hypothyroid 2019    Type 2 diabetes mellitus 2019     Sly Villarreal  has a past surgical history that includes Percutaneous cryotherapy of peripheral nerve using liquid nitrous oxide in closed needle device (Right, 1/24/2019) and Joint replacement (Right).    Sly has a current medication list which includes the following prescription(s): acetaminophen, aspirin, bumetanide, cephalexin, cholecalciferol (vitamin d3), diclofenac, docusate sodium, ergocalciferol,  famotidine, levothyroxine, meloxicam, metformin, oxycodone-acetaminophen, tamsulosin, and timolol maleate 0.5%, and the following Facility-Administered Medications: tranexamic acid.    Review of patient's allergies indicates:  No Known Allergies     Imaging, x-ray: 19  Right knee arthroplasty.  Hardware is intact.  Expected subcutaneous emphysema.    Prior Therapy: Yes for R TKA  Social History: live with family on 1st floor of home, no steps to enter home  Occupation: retired  Prior Level of Function: L knee pain with general ADLs  Current Level of Function: impaired gait, transitions, bed mobility, and all weight bearing activities on LLE    Pain:  Current 7/10, worst 10/10, best 5/10   Location: left knee   Description: Aching, Burning, Tight and Hot  Aggravating Factors: Laying, Bending, Walking, Morning, Extension, Flexing, Lifting and Getting out of bed/chair  Easing Factors: pain medication, rest  Pts goals: To get better    Objective     Skin: no drainage under bandage, slight redness at medial aspect of bandage and skin below distal aspect of bandage, mild edema present  Posture: increased forward lean  Palpation: +TTP at joint lines and along incision of L knee    AROM: *denotes pain  left knee: 4-75  PROM: *denotes pain  left knee: 1-84    L/E Strength w/ MicroFET Muscle Letty Dynamometer Right Left Pain/Dysfunction with Movement   (approx 4 sec hold w/ max contraction)   Hip Flexion 10.6 kg  4.9 kg     Hip Abduction 15.4 kg  7.8 kg     Quadriceps 15.4 kg  6.8 kg     Hamstrings 15.7 kg  6.3 kg       TU minutes and 33 seconds (w/ RW)  30 second sit-to-stand test (without U/E support): 3 (w/ UE support)    KOOS Knee Survey:    Symptoms:   Pain:  2436  Functional, Daily Livin/68 (75% disabled)  Function, Sports and Recreational Activities:  15/20  Quality of Life:      PROMIS-29:    Physical Function:    Anxiety:    Depression:     Fatigue:     Sleep Disturbance:   9/20   Satisfaction with Social Role:  8/20   Pain Interference:  16/20   Pain Intensity:  9/10    Gait Analysis: with RW: step to gait pattern, decreased LLE WB, increased trunk lean    CMS Impairment/Limitation/Restriction for KOOS, Functional, Daily Living    Therapist reviewed KOOS scores for Sly Villarreal on 7/23/2019.   KOOS documents entered into [employer] - see Media section.    Limitation Score: 75%  Category: Mobility    Current : CL = least 60% but < 80% impaired, limited or restricted  Goal: CK = at least 40% but < 60% impaired, limited or restricted    Limitation for FOTO Knee Survey  Limitation Score: 48%       TREATMENT     Home Exercises and Patient Education Provided  Education provided:   - course of therapy, prognosis  - importance of exercises that will be given and reviewed tomorrow due to pt N/V currently present.   - work in standing and walking with good wieghtbearing using RW for now    Written Home Exercises Provided: Not today, next visit  Exercises were reviewed and Sly was able to demonstrate them prior to the end of the session.  Sly demonstrated good  understanding of the education provided.     See EMR under Media for exercises provided Next visit.    Assessment     Sly is a 79 y.o. male referred to outpatient Physical Therapy with a medical diagnosis of Primary osteoarthritis of left knee. Pt presents to PT with pain, decreased left knee ROM, decreased strength, poor posture, impaired balance and gait, and functional deficits with walking. Pt would benefit from skilled PT consisting of manual therapy including STM/MFR left  knee, patellar mobs, knee flex/ext mobs/stretching; therapeutic exercise including LE strengthening/stretching, postural education, balance and gait training, and modalities prn to address limitations and increase functional mobility.      Pt prognosis is Excellent.   Pt will benefit from skilled outpatient Physical Therapy to address the deficits  stated above and in the chart below, provide pt/family education, and to maximize pt's level of independence.     Plan of care discussed with patient: Yes  Pt's spiritual, cultural and educational needs considered and patient is agreeable to the plan of care and goals as stated below:     Anticipated Barriers for therapy: adverse reactions to pain medications and anesthesia    Medical Necessity is demonstrated by the following  History  Co-morbidities and personal factors that may impact the plan of care Co-morbidities:   DMII, past R TKA    Personal Factors:   requires      moderate   Examination  Body Structures and Functions, activity limitations and participation restrictions that may impact the plan of care Body Regions:   lower extremities  trunk    Body Systems:    gross symmetry  ROM  strength  gross coordinated movement  balance  gait  transfers  transitions  motor control  edema  scar formation  skin integrity  skin color    Participation Restrictions:   Community walking    Activity limitations:   Learning and applying knowledge  no deficits    General Tasks and Commands  no deficits    Communication  communicating with/receiving spoken language    Mobility  lifting and carrying objects  walking  driving (bike, car, motorcycle)    Self care  dressing    Domestic Life  shopping  cooking  doing house work (cleaning house, washing dishes, laundry)  assisting others    Interactions/Relationships  no deficits    Life Areas  no deficits    Community and Social Life  no deficits         high   Clinical Presentation stable and uncomplicated low   Decision Making/ Complexity Score: low     Goals:  Short Term Goals: (3 weeks)  1. Pt will be independent with HEP supplement PT in improving functional mobility.  2. Pt will improve LLE strength to at least 75% of R LE strength as measured via MicroFet handheld dynamometer in order to improve functional mobility  3. Pt will improve L knee AROM to at least 0-90  "degrees in order to improve gait    Long Term Goals: (6 weeks)  1. Pt will be independent with updated HEP supplement PT in improving functional mobility.  2. Pt will improve L LE strength to at least 90% of R LE strength as measured via MicroFet handheld dynamometer in order to improve functional mobility  3. Pt will improve L knee AROM to at least 0-115 degrees in order to improve gait and ability to perform ADLs  4. Pt will improve FOTO knee survey score to </= 48% limited in order to demo improved functional mobility  5. Pt will improve score on Function,Daily Living section of KOOS to at least 34/68   (50% limited) in order to demo improved functional mobility  6. Pt will perform TUG in < 10 seconds without AD in order to demo improved gait speed  7. Pt will perform at least 10 sit to stands without UE support on 30 second sit to stand test in order to demo improved ability to perform transfers      TUG Cutoff Scores:        30" sit to stand Cutoff Scores:          Plan     Plan of care Certification: 7/23/2019 to 9/17/19    Outpatient Physical Therapy 4 times weekly for 6 weeks to include the following interventions: Gait Training, Manual Therapy, Moist Heat/ Ice, Neuromuscular Re-ed, Orthotic Management and Training, Patient Education, Therapeutic Activites and Therapeutic Exercise, HAMILTON, Kinesiotadelfin Santa, PT, DPT  "

## 2019-07-23 NOTE — DISCHARGE SUMMARY
Left TKA Procedure Note    Sly Haynesq  [id number]    Date of Procedure: 7/23/2019    Pre-op Diagnosis:  Left knee osteoarthritis       Post-op Diagnosis: Same    Procedure(s):  Left total knee replacement    Anesthesia: General    Surgeon(s) and Role:  Panel 1:     * Marcus Bro MD - Primary     * Kp Lara MD - Resident: Surgeon Not Chief    Estimated Blood Loss: less than 50 mL    Quantatative Blood Loss: No Data Recorded           Drain:  None            Total IV Fluids:  See Anesthesia logs           Specimens: * No specimens in log *           Implants:   1. landon next gen tibial tray, size 5  2. landon next gen Femoral component, size G   left  3. landon next gen all-poly patella, size 32    4. landon next gen posterior stabilized highly crosslinked  polyethylene, 12 mm height.   5. antibiotic bone cement x2            Complications:  None    Findings:  Consistent with preop diagnosis           Disposition: awakened from anesthesia, extubated and taken to the recovery room in a stable condition, having suffered no apparent untoward event.           Condition: doing well without problems      Technique: See operative report     Admission Condition: stable    Discharged Condition: stable    Indication for Admission:  Surgery    Hospital Course: Patient presented to [facility] on day of scheduled surgery and underwent Left total knee arthroplasty, please see operative report for further detail. Patient did well postoperatively and was found to be fit for discharge on POD# 0.  The patient mobilized with physical therapy.  They were taught how to use DME appropriately.  Their pain was controlled.  The patient met all discharge criteria.  The patient was discharged home in stable condition. Patient was given paper prescriptions.  They were given a follow-up appointment in 2 weeks in clinic for a wound check and range of motion check.  All questions and concerns of the patient were answered to their  satisfaction.    Patient Instructions:   There are no discharge medications for this patient.    Activity: activity as tolerated  Diet: regular diet  Wound Care: keep wound clean and dry and as directed    - - -  Kp Lara MD   [facility], PGY-3  Pager: [phone]

## 2019-07-23 NOTE — TELEPHONE ENCOUNTER
Spoke to patient's daughter, who reports that he has vomited several times today and feels a little dizzy (TKA surgery yesterday). I offered to call in Zofran for nausea, however she reports that she already has some and will try that. I advised her that I cannot perform a thorough assessment over the phone and that they should seek medical care if he does not feel better, such as contacting his PCP or going to the ER tonight. She expressed understanding.

## 2019-07-24 ENCOUNTER — CLINICAL SUPPORT (OUTPATIENT)
Dept: REHABILITATION | Facility: HOSPITAL | Age: 79
End: 2019-07-24
Payer: MEDICARE

## 2019-07-24 DIAGNOSIS — M25.562 ACUTE PAIN OF LEFT KNEE: ICD-10-CM

## 2019-07-24 DIAGNOSIS — Z74.09 DECREASED MOBILITY AND ENDURANCE: ICD-10-CM

## 2019-07-24 DIAGNOSIS — R53.1 DECREASED STRENGTH: ICD-10-CM

## 2019-07-24 PROCEDURE — 97110 THERAPEUTIC EXERCISES: CPT | Mod: PN

## 2019-07-24 NOTE — PROGRESS NOTES
Physical Therapy Daily Treatment Note     Name: Sly Villarreal  Clinic Number: [id number]    Therapy Diagnosis:   Encounter Diagnoses   Name Primary?    Acute pain of left knee     Decreased strength     Decreased mobility and endurance      Physician: Marcus Bro MD    Visit Date: 7/24/2019    Physician Orders: PT Eval and Treat  Medical Diagnosis from Referral: M17.12 (ICD-10-CM) - Primary osteoarthritis of left knee  Evaluation Date: 7/23/2019  Authorization Period Expiration: 12/31/2019  Plan of Care Expiration: 9/17/19  Visit # / Visits authorized: 2/50  FOTO: 2/10  Gcode: 2/10    Visit:  60.64  Total: 1118.52     Time In: 10:00 am  Time Out: 11:10 am  Total Billable Time: 30 minutes (2 TE)     Precautions: Standard, DM II, past R TKA    Subjective     Pt reports: he is not as nauseas today and here with his daughter for translation purposes.  He was compliant with home exercise program.  Response to previous treatment: posterior knee pain, 1st after  Functional change: no change    Pain: 8/10  Location: left knee      Objective     Sly received therapeutic exercises to develop strength, endurance, ROM, flexibility and posture for 30 minutes 1:1 with pt and 30 minutes supervised including:  Quad sets: 2x10 L 5'' holds, towel unde rknee  SL hip abd: 2x10 L  HSS: 3x30'' L c/ strap  Heel slides: 2x10 L, min A  SAQ: 2x10 L  SLR: 10x L, Mod A  Seated marching: 2x10 B  LAQ: 2x10 B    Sly received the following manual therapy techniques: Joint mobilizations, Manual traction, Myofacial release, Manual Lymphatic Drainage and Soft tissue Mobilization were applied to the: L knee for 0 minutes, including: NEXT  NEXT STM c/ bandage removal    Sly received cold pack for 10 minutes to L knee with foot propped on traction stool to decrease pain and edema..    Home Exercises Provided and Patient Education Provided   Education provided:   - course of therapy, prognosis  - importance of exercises that will be  given and reviewed tomorrow due to pt N/V currently present.   - work in standing and walking with good wieghtbearing using RW for now    Written Home Exercises Provided: yes.  Exercises were reviewed and Sly was able to demonstrate them prior to the end of the session.  Sly demonstrated good  understanding of the education provided.     See EMR under Media for exercises provided 7/24/2019.    Assessment     Pt did well today with fair quad activation and fair (-) SLR with mod extensor lag. Pt complaint with all activities and appeared to tolerate session well. Pt's daughter present during whole session for translation. Most of pt pain is behind his knee currently and active knee flexion today was around 70 degrees.    Sly is progressing well towards his goals.   Pt prognosis is Excellent.     Pt will continue to benefit from skilled outpatient physical therapy to address the deficits listed in the problem list box on initial evaluation, provide pt/family education and to maximize pt's level of independence in the home and community environment.   Pt's spiritual, cultural and educational needs considered and pt agreeable to plan of care and goals.     Anticipated barriers to physical therapy: adverse reactions to pain medications and anesthesia    Goals:  Short Term Goals: (3 weeks)  1. Pt will be independent with HEP supplement PT in improving functional mobility. - progressing  2. Pt will improve LLE strength to at least 75% of R LE strength as measured via MicroFet handheld dynamometer in order to improve functional mobility - progressing  3. Pt will improve L knee AROM to at least 0-90 degrees in order to improve gait - progressing     Long Term Goals: (6 weeks)  1. Pt will be independent with updated HEP supplement PT in improving functional mobility. - progressing  2. Pt will improve L LE strength to at least 90% of R LE strength as measured via MicroFet handheld dynamometer in order to improve  functional mobility - progressing  3. Pt will improve L knee AROM to at least 0-115 degrees in order to improve gait and ability to perform ADLs - progressing  4. Pt will improve FOTO knee survey score to </= 48% limited in order to demo improved functional mobility - progressing  5. Pt will improve score on Function,Daily Living section of KOOS to at least 34/68 - progressing   (50% limited) in order to demo improved functional mobility - progressing  6. Pt will perform TUG in < 10 seconds without AD in order to demo improved gait speed - progressing  7. Pt will perform at least 10 sit to stands without UE support on 30 second sit to stand test in order to demo improved ability to perform transfers - progressing    Plan     Cont per TKA protocol. Remove bandage next visit.    Rufus Santa, PT

## 2019-07-26 ENCOUNTER — CLINICAL SUPPORT (OUTPATIENT)
Dept: REHABILITATION | Facility: HOSPITAL | Age: 79
End: 2019-07-26
Payer: MEDICARE

## 2019-07-26 ENCOUNTER — HOSPITAL ENCOUNTER (EMERGENCY)
Facility: HOSPITAL | Age: 79
Discharge: HOME | End: 2019-07-26
Payer: MEDICARE

## 2019-07-26 VITALS
WEIGHT: 165 LBS | HEIGHT: 67 IN | RESPIRATION RATE: 18 BRPM | BODY MASS INDEX: 25.9 KG/M2 | DIASTOLIC BLOOD PRESSURE: 71 MMHG | TEMPERATURE: 99 F | OXYGEN SATURATION: 99 % | SYSTOLIC BLOOD PRESSURE: 159 MMHG | HEART RATE: 95 BPM

## 2019-07-26 DIAGNOSIS — R33.9 URINARY RETENTION: Primary | ICD-10-CM

## 2019-07-26 DIAGNOSIS — R53.1 DECREASED STRENGTH: ICD-10-CM

## 2019-07-26 DIAGNOSIS — Z74.09 DECREASED MOBILITY AND ENDURANCE: ICD-10-CM

## 2019-07-26 DIAGNOSIS — M25.562 ACUTE PAIN OF LEFT KNEE: ICD-10-CM

## 2019-07-26 LAB
ABO + RH BLD: NORMAL
ALBUMIN SERPL BCP-MCNC: 2.9 G/DL (ref 3.5–5.2)
ALP SERPL-CCNC: 89 U/L (ref 55–135)
ALT SERPL W/O P-5'-P-CCNC: 35 U/L (ref 10–44)
ANION GAP SERPL CALC-SCNC: 12 MMOL/L (ref 8–16)
AST SERPL-CCNC: 36 U/L (ref 10–40)
BASOPHILS # BLD AUTO: 0.01 K/UL (ref 0–0.2)
BASOPHILS # BLD AUTO: NORMAL K/UL (ref 0–0.2)
BASOPHILS NFR BLD: 0.1 % (ref 0–1.9)
BASOPHILS NFR BLD: NORMAL % (ref 0–1.9)
BILIRUB SERPL-MCNC: 1.3 MG/DL (ref 0.1–1)
BILIRUB UR QL STRIP: NEGATIVE
BLD GP AB SCN CELLS X3 SERPL QL: NORMAL
BUN SERPL-MCNC: 18 MG/DL (ref 8–23)
CALCIUM SERPL-MCNC: 9 MG/DL (ref 8.7–10.5)
CHLORIDE SERPL-SCNC: 100 MMOL/L (ref 95–110)
CLARITY UR: CLEAR
CO2 SERPL-SCNC: 18 MMOL/L (ref 23–29)
COLOR UR: YELLOW
CREAT SERPL-MCNC: 1.3 MG/DL (ref 0.5–1.4)
DIFFERENTIAL METHOD: ABNORMAL
DIFFERENTIAL METHOD: NORMAL
EOSINOPHIL # BLD AUTO: 0.1 K/UL (ref 0–0.5)
EOSINOPHIL # BLD AUTO: NORMAL K/UL (ref 0–0.5)
EOSINOPHIL NFR BLD: 0.5 % (ref 0–8)
EOSINOPHIL NFR BLD: NORMAL % (ref 0–8)
ERYTHROCYTE [DISTWIDTH] IN BLOOD BY AUTOMATED COUNT: 16.2 % (ref 11.5–14.5)
ERYTHROCYTE [DISTWIDTH] IN BLOOD BY AUTOMATED COUNT: NORMAL % (ref 11.5–14.5)
EST. GFR  (AFRICAN AMERICAN): 59 ML/MIN/1.73 M^2
EST. GFR  (NON AFRICAN AMERICAN): 51 ML/MIN/1.73 M^2
GLUCOSE SERPL-MCNC: 117 MG/DL (ref 70–110)
GLUCOSE UR QL STRIP: NEGATIVE
HCT VFR BLD AUTO: 34.2 % (ref 40–54)
HCT VFR BLD AUTO: NORMAL % (ref 40–54)
HGB BLD-MCNC: 11.6 G/DL (ref 14–18)
HGB BLD-MCNC: NORMAL G/DL (ref 14–18)
HGB UR QL STRIP: NEGATIVE
KETONES UR QL STRIP: NEGATIVE
LEUKOCYTE ESTERASE UR QL STRIP: NEGATIVE
LYMPHOCYTES # BLD AUTO: 1.4 K/UL (ref 1–4.8)
LYMPHOCYTES # BLD AUTO: NORMAL K/UL (ref 1–4.8)
LYMPHOCYTES NFR BLD: 13.6 % (ref 18–48)
LYMPHOCYTES NFR BLD: NORMAL % (ref 18–48)
MCH RBC QN AUTO: 28.6 PG (ref 27–31)
MCH RBC QN AUTO: NORMAL PG (ref 27–31)
MCHC RBC AUTO-ENTMCNC: 33.9 G/DL (ref 32–36)
MCHC RBC AUTO-ENTMCNC: NORMAL G/DL (ref 32–36)
MCV RBC AUTO: 84 FL (ref 82–98)
MCV RBC AUTO: NORMAL FL (ref 82–98)
MONOCYTES # BLD AUTO: 1.3 K/UL (ref 0.3–1)
MONOCYTES # BLD AUTO: NORMAL K/UL (ref 0.3–1)
MONOCYTES NFR BLD: 12.6 % (ref 4–15)
MONOCYTES NFR BLD: NORMAL % (ref 4–15)
NEUTROPHILS # BLD AUTO: 7.5 K/UL (ref 1.8–7.7)
NEUTROPHILS # BLD AUTO: NORMAL K/UL (ref 1.8–7.7)
NEUTROPHILS NFR BLD: 73.2 % (ref 38–73)
NEUTROPHILS NFR BLD: NORMAL % (ref 38–73)
NITRITE UR QL STRIP: NEGATIVE
OB PNL STL: POSITIVE
PH UR STRIP: 6 [PH] (ref 5–8)
PLATELET # BLD AUTO: 210 K/UL (ref 150–350)
PLATELET # BLD AUTO: NORMAL K/UL (ref 150–350)
PMV BLD AUTO: 9.1 FL (ref 9.2–12.9)
PMV BLD AUTO: NORMAL FL (ref 9.2–12.9)
POTASSIUM SERPL-SCNC: 4.1 MMOL/L (ref 3.5–5.1)
PROT SERPL-MCNC: 6.1 G/DL (ref 6–8.4)
PROT UR QL STRIP: NEGATIVE
RBC # BLD AUTO: 4.06 M/UL (ref 4.6–6.2)
RBC # BLD AUTO: NORMAL M/UL (ref 4.6–6.2)
SODIUM SERPL-SCNC: 130 MMOL/L (ref 136–145)
SP GR UR STRIP: <=1.005 (ref 1–1.03)
URN SPEC COLLECT METH UR: ABNORMAL
UROBILINOGEN UR STRIP-ACNC: NEGATIVE EU/DL
WBC # BLD AUTO: 10.5 K/UL (ref 3.9–12.7)
WBC # BLD AUTO: NORMAL K/UL (ref 3.9–12.7)

## 2019-07-26 PROCEDURE — 81003 URINALYSIS AUTO W/O SCOPE: CPT

## 2019-07-26 PROCEDURE — 80053 COMPREHEN METABOLIC PANEL: CPT

## 2019-07-26 PROCEDURE — 85025 COMPLETE CBC W/AUTO DIFF WBC: CPT | Mod: 91

## 2019-07-26 PROCEDURE — 51702 INSERT TEMP BLADDER CATH: CPT

## 2019-07-26 PROCEDURE — 97110 THERAPEUTIC EXERCISES: CPT | Mod: PN

## 2019-07-26 PROCEDURE — 97140 MANUAL THERAPY 1/> REGIONS: CPT | Mod: PN

## 2019-07-26 PROCEDURE — 96360 HYDRATION IV INFUSION INIT: CPT

## 2019-07-26 PROCEDURE — 99285 EMERGENCY DEPT VISIT HI MDM: CPT | Mod: 25

## 2019-07-26 PROCEDURE — 86850 RBC ANTIBODY SCREEN: CPT

## 2019-07-26 PROCEDURE — 82272 OCCULT BLD FECES 1-3 TESTS: CPT

## 2019-07-26 PROCEDURE — 63600175 PHARM REV CODE 636 W HCPCS

## 2019-07-26 RX ORDER — CEPHALEXIN 500 MG/1
500 CAPSULE ORAL EVERY 6 HOURS
COMMUNITY
End: 2023-05-04

## 2019-07-26 RX ORDER — IBUPROFEN 600 MG/1
600 TABLET ORAL EVERY 6 HOURS PRN
Qty: 20 TABLET | Refills: 0 | Status: SHIPPED | OUTPATIENT
Start: 2019-07-26 | End: 2019-07-26 | Stop reason: SDUPTHER

## 2019-07-26 RX ORDER — IBUPROFEN 600 MG/1
600 TABLET ORAL
Status: DISCONTINUED | OUTPATIENT
Start: 2019-07-26 | End: 2019-07-26

## 2019-07-26 RX ADMIN — SODIUM CHLORIDE 1000 ML: 0.9 INJECTION, SOLUTION INTRAVENOUS at 01:07

## 2019-07-26 NOTE — ED NOTES
"Pt presents to the ED with c/o urinary frequency. Pt reports "I have been using the bathroom more and only go a little." pt also with c/o dysuria at this time. Pt reports having a knee replacement surgery on 7/22/19 by Dr. Bro. Pt denies any knee pain, cp, sob, headache, abdominal pain, or weakness at this time.   "

## 2019-07-26 NOTE — ED NOTES
Dr. Gibson at bedside speaking with pt and family regarding consent for blood transfusion. Pt and family verbalized understanding. Consent signed and witnessed.

## 2019-07-26 NOTE — PROGRESS NOTES
Physical Therapy Daily Treatment Note     Name: Sly Villarreal  Clinic Number: [id number]    Therapy Diagnosis:   Encounter Diagnoses   Name Primary?    Acute pain of left knee     Decreased strength     Decreased mobility and endurance      Physician: Marcus Bro MD    Visit Date: 7/26/2019    Physician Orders: PT Eval and Treat  Medical Diagnosis from Referral: M17.12 (ICD-10-CM) - Primary osteoarthritis of left knee  Evaluation Date: 7/23/2019  Authorization Period Expiration: 12/31/2019  Plan of Care Expiration: 9/17/19  Visit # / Visits authorized: 3/50    FOTO: 3/10  Gcode: 3/10      Visit:  85.11 Total: 1203.63     Time In: 1100  Time Out: 1155  Total Billable Time: 40 minutes (2 TE, 1 MT)  Precautions: Standard, DM II, past R TKA    Subjective     Pt reports: going to the ED today due to urinary retention.  A Long catheter was placed; in place until Tuesday 07/30/2019.  Presents to PT with daughter who translates for him.   He was compliant with home exercise program.  Response to previous treatment: no complaints  Functional change: no change  Pain: 8/10  Location: left knee      Objective   O:  Inspection:  Distal thigh and proximal leg ecchymosis and pitting edema.         L knee PROM: (-) 6 - 108 degrees    Sly received therapeutic exercises to develop strength, endurance, ROM, flexibility and posture for 30 minutes 1:1 with PT minutes:  -Quad sets: 2x10 L 5'' holds, towel unde rknee  -SL hip abd: held today due to Long catheter bag attached non-op RLE  -HSS: 3x30'' L c/ strap  -Heel slides: x10 reps  -SAQ: 2x10 L  -SLR: 2x10 Min-to-Mod A  -Seated marching: 2x10 B  -LAQ: 2x10 B    Sly received the following manual therapy techniques: Joint mobilizations, Manual traction, Myofacial release, Manual Lymphatic Drainage and Soft tissue Mobilization were applied to the: L knee for 10 minutes:  -surgical dressing removal with surgical wound inspection.  - MLD to LLE    Sly received cold  pack for 10 minutes to L knee with foot propped on traction stool to decrease pain and edema..    Home Exercises Provided and Patient Education Provided   Education provided:   - contiue HEP  - do not submerse surgical wound in bath water.    - educate on steri-strips.    - work in standing and walking with good wieghtbearing using RW for now    Written Home Exercises Provided: yes.  Exercises were reviewed and Sly was able to demonstrate them prior to the end of the session.  Sly demonstrated good  understanding of the education provided.     See EMR under Media for exercises provided 7/24/2019.    Assessment   A:  Fair quad set; quick too fatigue.  Pain primary limiting factor at this time.  S/p L TKA; POD #4.  Ambulating with RW.      Sly is progressing well towards his goals.   Pt prognosis is Excellent.     Pt will continue to benefit from skilled outpatient physical therapy to address the deficits listed in the problem list box on initial evaluation, provide pt/family education and to maximize pt's level of independence in the home and community environment.   Pt's spiritual, cultural and educational needs considered and pt agreeable to plan of care and goals.     Anticipated barriers to physical therapy: adverse reactions to pain medications and anesthesia    Goals:  Short Term Goals: (3 weeks)  1. Pt will be independent with HEP supplement PT in improving functional mobility. - progressing  2. Pt will improve LLE strength to at least 75% of R LE strength as measured via MicroFet handheld dynamometer in order to improve functional mobility - progressing  3. Pt will improve L knee AROM to at least 0-90 degrees in order to improve gait - progressing     Long Term Goals: (6 weeks)  1. Pt will be independent with updated HEP supplement PT in improving functional mobility. - progressing  2. Pt will improve L LE strength to at least 90% of R LE strength as measured via MicroFet handheld dynamometer  in order to improve functional mobility - progressing  3. Pt will improve L knee AROM to at least 0-115 degrees in order to improve gait and ability to perform ADLs - progressing  4. Pt will improve FOTO knee survey score to </= 48% limited in order to demo improved functional mobility - progressing  5. Pt will improve score on Function,Daily Living section of KOOS to at least 34/68 - progressing   (50% limited) in order to demo improved functional mobility - progressing  6. Pt will perform TUG in < 10 seconds without AD in order to demo improved gait speed - progressing  7. Pt will perform at least 10 sit to stands without UE support on 30 second sit to stand test in order to demo improved ability to perform transfers - progressing    Plan   Dasa protocol.      Jonh Escudero, PT

## 2019-07-26 NOTE — ED PROVIDER NOTES
Encounter Date: 7/26/2019    SCRIBE #1 NOTE: I, Abby Milvia, am scribing for, and in the presence of,  Kraig Gibson MD. I have scribed the entire note.       History     Chief Complaint   Patient presents with    Urinary Frequency     wednesday patient started having frequent urination and some incontinence however patient states when he urinates he is only getting a little bit out at a time; accompanied by dsyuria. pt had knee replacement sx done by Dr. Bro on 07/22/19     Time seen by provider: 1:36 AM    This is a 79 y.o. male who presents with complaint of increased urinary frequency and dysuria that began today.  He denies any other symptoms besides R knee pain. On 07/22, he had R knee replacement performed by Dr. Bro. He has NKDA. No other palliative or provocative factors except as noted.     The history is provided by the patient and a relative. A  was used.     Review of patient's allergies indicates:  No Known Allergies  Past Medical History:   Diagnosis Date    Hypothyroid 2019    Type 2 diabetes mellitus 2019     Past Surgical History:   Procedure Laterality Date    ARTHROPLASTY, KNEE, SIGHT ASSISTED Left 7/22/2019    Performed by [facility] at [facility] OR    ARTHROPLASTY, KNEE, SIGHT ASSISTED Right 1/28/2019    Performed by Marcus Bro MD at [facility] OR    CRYOTHERAPY, NERVE, PERIPHERAL, PERCUTANEOUS, USING LIQUID NITROUS OXIDE IN CLOSED NEEDLE DEVICE Right 1/24/2019    Performed by [place] Sutherland at [facility] OR    JOINT REPLACEMENT Right     knee     History reviewed. No pertinent family history.  Social History     Tobacco Use    Smoking status: Never Smoker    Smokeless tobacco: Never Used   Substance Use Topics    Alcohol use: No    Drug use: Never     Review of Systems   All other systems reviewed and are negative.      Physical Exam     Initial Vitals [07/26/19 0120]   BP Pulse Resp Temp SpO2   127/65 (!) 122 14 98.3 °F (36.8 °C) 99 %      MAP       --          Physical Exam    Nursing note and vitals reviewed.  Constitutional: He appears well-developed.   HENT:   Head: Normocephalic and atraumatic.   Eyes: Pupils are equal, round, and reactive to light.   Neck: Neck supple.   Cardiovascular:   Tachycardic   Pulmonary/Chest: No respiratory distress.   Abdominal: Soft. He exhibits no distension. There is no tenderness. There is no rigidity, no rebound, no guarding, no CVA tenderness, no tenderness at McBurney's point and negative Rich's sign.   Genitourinary:   Genitourinary Comments:  normal; Rectal exam findings: normal tone, normal prostate, and soft brown stool in vault   Musculoskeletal:   no acute deformity   Neurological: He is alert.   Skin: Skin is warm and dry.   Psychiatric: He has a normal mood and affect.         ED Course   Procedures  Labs Reviewed   CBC W/ AUTO DIFFERENTIAL - Abnormal; Notable for the following components:       Result Value    RBC 1.89 (*)     Hemoglobin 5.4 (*)     Hematocrit 16.4 (*)     RDW 16.4 (*)     MPV 8.9 (*)     Lymph # 0.9 (*)     Lymph% 17.5 (*)     All other components within normal limits    Narrative:        HGB and HCT critical result(s) called and verbal readback obtained   from Ariadna Otero RN, 07/26/2019 02:28   URINALYSIS, REFLEX TO URINE CULTURE - Abnormal; Notable for the following components:    Specific Gravity, UA <=1.005 (*)     All other components within normal limits    Narrative:     Preferred Collection Type->Urine, Clean Catch   OCCULT BLOOD X 1, STOOL - Abnormal; Notable for the following components:    Occult Blood Positive (*)     All other components within normal limits   CBC W/ AUTO DIFFERENTIAL - Abnormal; Notable for the following components:    RBC 4.06 (*)     Hemoglobin 11.6 (*)     Hematocrit 34.2 (*)     RDW 16.2 (*)     MPV 9.1 (*)     Mono # 1.3 (*)     Gran% 73.2 (*)     Lymph% 13.6 (*)     All other components within normal limits   COMPREHENSIVE METABOLIC PANEL - Abnormal; Notable  for the following components:    Sodium 130 (*)     CO2 18 (*)     Glucose 117 (*)     Albumin 2.9 (*)     Total Bilirubin 1.3 (*)     eGFR if  59 (*)     eGFR if non  51 (*)     All other components within normal limits   TYPE & SCREEN               Medical Decision Making:   Initial Assessment:   This is a 79 y.o. male who presents with complaint of increased urinary frequency and dysuria that began today.   Clinical Tests:   Lab Tests: Ordered and Reviewed  ED Management:  Initial CBC concerning for significant blood loss; repeated study shows more normal values.  Initial study apparently diluted.  Bladder scan shows enlarged bladder and significant postvoid residual.  Long catheter placed with significant relief of symptoms. Will discharge with Long place.  Patient is nontoxic appearing in the ED.  No persistent emergent issues detected.  Exam benign. Verbal discharge instructions and return precautions given.  Patient will return to the ED as needed for any deterioration or any other concerns.   We will discharge home to follow up with Urology.                        Clinical Impression:       ICD-10-CM ICD-9-CM   1. Urinary retention R33.9 788.20                  I personally performed the services described in this documentation. All medical record entries made by the scribe were at my direction and in my presence.  I have reviewed the chart and agree that the record reflects my personal performance and is accurate and complete within the limitations of emergency medical charting.   --Kraig Gibson M.D. 4:38 AM 07/26/2019         Kraig Gibson MD  07/26/19 0468

## 2019-07-26 NOTE — ED NOTES
Pt and family educated on discharge instructions and follow up care. Pt and family verbalized understanding.

## 2019-07-26 NOTE — ED NOTES
Pt bladder scanned pre void with >500cc in bladder. Pt able to stand up and use urinal to void, pt voided 150cc. Post void scan 700cc in bladder.

## 2019-07-29 ENCOUNTER — CLINICAL SUPPORT (OUTPATIENT)
Dept: REHABILITATION | Facility: HOSPITAL | Age: 79
End: 2019-07-29
Payer: MEDICARE

## 2019-07-29 DIAGNOSIS — R53.1 DECREASED STRENGTH: ICD-10-CM

## 2019-07-29 DIAGNOSIS — M25.562 ACUTE PAIN OF LEFT KNEE: ICD-10-CM

## 2019-07-29 DIAGNOSIS — Z74.09 DECREASED MOBILITY AND ENDURANCE: ICD-10-CM

## 2019-07-29 PROCEDURE — 97110 THERAPEUTIC EXERCISES: CPT | Mod: PN

## 2019-07-29 PROCEDURE — 97140 MANUAL THERAPY 1/> REGIONS: CPT | Mod: PN

## 2019-07-29 NOTE — PROGRESS NOTES
Physical Therapy Daily Treatment Note     Name: Sly Villarreal  Clinic Number: [id number]    Therapy Diagnosis:   Encounter Diagnoses   Name Primary?    Acute pain of left knee     Decreased strength     Decreased mobility and endurance      Physician: Marcus Bro MD    Visit Date: 7/29/2019    Physician Orders: PT Eval and Treat  Medical Diagnosis from Referral: M17.12 (ICD-10-CM) - Primary osteoarthritis of left knee  Evaluation Date: 7/23/2019  Authorization Period Expiration: 12/31/2019  Plan of Care Expiration: 9/17/19  Visit # / Visits authorized: 4/50  FOTO: 4/10  Gcode: 4/10      Visit:  88.10 Total: 1379.83     Time In: 1120 am  Time Out: 1205 am  Total Billable Time: 45 minutes (2 TE, 1 MT)  Precautions: Standard, DM II, past R TKA    Subjective     Pt reports: he will get Long tube out tomorrow and was doing home exercises over the weekend.  He was compliant with home exercise program.  Response to previous treatment: no complaints  Functional change: no change  Pain: 7/10  Location: left knee      Objective     Sly received therapeutic exercises to develop strength, endurance, ROM, flexibility and posture for 30 minutes 1:1 with PT minutes:  -Quad sets: 2x10 L 5'' holds, towel under heel  -SL hip abd: held today due to Long catheter bag attached non-op RLE  -HSS: 3x30'' L c/ strap  -Heel slides: 2x10 L  -SLR: 2x10 L  -Seated marching: 2x10 B  -LAQ: 2x10 B    Sly received the following manual therapy techniques: Joint mobilizations, Manual traction, Myofacial release, Manual Lymphatic Drainage and Soft tissue Mobilization were applied to the: L knee for 10 minutes:  - MLD and STM/MFR to L lower leg and upper leg  - L patellar mobs Grade II-III    Sly received cold pack for 10 minutes to L knee with foot propped on traction stool to decrease pain and edema.    L knee ROM/MMT: 7/29/19  AROM: 2-90  PROM: 1-91  L/E Strength w/ MicroFET Muscle Letty Dynamometer Right Left Pain/Dysfunction  with Movement   (approx 4 sec hold w/ max contraction)   Hip Flexion 13.1 kg  8.7 kg     Hip Abduction 14.6 kg  9.7 kg     Quadriceps 17.7 kg  6.7 kg     Hamstrings 14.4 kg  9.3 kg       TU seconds c/ AD  30 second sit-to-stand test (without U/E support):     Home Exercises Provided and Patient Education Provided   Education provided:   - contiue HEP  - do not submerse surgical wound in bath water.    - educate on steri-strips.    - work in standing and walking with good wieghtbearing using RW for now    Written Home Exercises Provided: yes.  Exercises were reviewed and Sly was able to demonstrate them prior to the end of the session.  Sly demonstrated good  understanding of the education provided.     See EMR under Media for exercises provided 2019.    Assessment   A:  Pt progressing well and demonstrating appropriate goals for end of week 2 s/p L TKA per measurements above. Improved gait and mobility overall. Pt demonstrated increased L knee stiffness over weekend, ROm was at the end of last Friday. Progress quad strengthening and gait training.    Sly is progressing well towards his goals.   Pt prognosis is Excellent.     Pt will continue to benefit from skilled outpatient physical therapy to address the deficits listed in the problem list box on initial evaluation, provide pt/family education and to maximize pt's level of independence in the home and community environment.   Pt's spiritual, cultural and educational needs considered and pt agreeable to plan of care and goals.     Anticipated barriers to physical therapy: adverse reactions to pain medications and anesthesia    Goals:  Short Term Goals: (3 weeks)  1. Pt will be independent with HEP supplement PT in improving functional mobility. - progressing  2. Pt will improve LLE strength to at least 75% of R LE strength as measured via [employer] handheld dynamometer in order to improve functional mobility - progressing  3. Pt will  improve L knee AROM to at least 0-90 degrees in order to improve gait - progressing     Long Term Goals: (6 weeks)  1. Pt will be independent with updated HEP supplement PT in improving functional mobility. - progressing  2. Pt will improve L LE strength to at least 90% of R LE strength as measured via [employer] handheld dynamometer in order to improve functional mobility - progressing  3. Pt will improve L knee AROM to at least 0-115 degrees in order to improve gait and ability to perform ADLs - progressing  4. Pt will improve FOTO knee survey score to </= 48% limited in order to demo improved functional mobility - progressing  5. Pt will improve score on Function,Daily Living section of KOOS to at least 34/68 - progressing   (50% limited) in order to demo improved functional mobility - progressing  6. Pt will perform TUG in < 10 seconds without AD in order to demo improved gait speed - progressing  7. Pt will perform at least 10 sit to stands without UE support on 30 second sit to stand test in order to demo improved ability to perform transfers - progressing    Plan   Dasa protocol.      Rufus Santa, PT

## 2019-07-30 ENCOUNTER — CLINICAL SUPPORT (OUTPATIENT)
Dept: REHABILITATION | Facility: HOSPITAL | Age: 79
End: 2019-07-30
Payer: MEDICARE

## 2019-07-30 DIAGNOSIS — Z74.09 DECREASED MOBILITY AND ENDURANCE: ICD-10-CM

## 2019-07-30 DIAGNOSIS — R53.1 DECREASED STRENGTH: ICD-10-CM

## 2019-07-30 DIAGNOSIS — M25.562 ACUTE PAIN OF LEFT KNEE: ICD-10-CM

## 2019-07-30 PROCEDURE — 97124 MASSAGE THERAPY: CPT | Mod: PN

## 2019-07-30 PROCEDURE — 97127 *HC THERAPEUTIC INTVTN, COGN FUNCTION - PT: CPT | Mod: PN

## 2019-07-30 NOTE — PROGRESS NOTES
"  Physical Therapy Daily Treatment Note     Name: Sly Villarreal  Clinic Number: [id number]    Therapy Diagnosis:   Encounter Diagnoses   Name Primary?    Acute pain of left knee     Decreased strength     Decreased mobility and endurance      Physician: Marcus Bro MD    Visit Date: 7/30/2019    Physician Orders: PT Eval and Treat  Medical Diagnosis from Referral: M17.12 (ICD-10-CM) - Primary osteoarthritis of left knee  Evaluation Date: 7/23/2019  Authorization Period Expiration: 12/31/2019  Plan of Care Expiration: 9/17/19  Visit # / Visits authorized: 5/50  FOTO: 5/10  Gcode: 5/10   Visit:  88.10   Total: 1379.83     Time In: 2:10 pm  Time Out: 3:00 pm  Total Billable Time: 30 minutes (1 TE, 1 MT)  Precautions: Standard, DM II, past R TKA    Subjective     Pt reports: he did not get riggs removed. Did a lot of walking today  He was compliant with home exercise program.  Response to previous treatment: no complaints  Functional change: no change  Pain: 7/10  Location: left knee      Objective     Sly received therapeutic exercises to develop strength, endurance, ROM, flexibility and posture for 30 minutes  including  -Quad sets: 2x10 L 5'' holds, towel under heel  -SL hip abd: held today due to Riggs catheter bag attached non-op RLE  -HSS: 3x30'' L c/ strap  -Heel slides: 2x10 L  -SLR: 2x10 L  -Seated marching: 2x10 B  -LAQ: 2x10 B  Bridges 2 x 10 B    //Bars:   Heel raises 2 x 10 B  Calf stretch 2 x 45" hold fitter    Sly received the following manual therapy techniques: Joint mobilizations, Manual traction, Myofacial release, Manual Lymphatic Drainage and Soft tissue Mobilization were applied to the: L knee for 10 minutes:  - MLD and STM/MFR to L lower leg and upper leg  - L patellar mobs Grade II-III    Sly received cold pack for 10 minutes to L knee with foot propped on traction stool to decrease pain and edema.      AROM: 2-95  PROM: 1-100*    Home Exercises Provided and Patient Education " Provided   Education provided:   - contiue HEP  - do not submerse surgical wound in bath water.    - educate on steri-strips.    - work in standing and walking with good wieghtbearing using RW for now    Written Home Exercises Provided: yes.  Exercises were reviewed and Sly was able to demonstrate them prior to the end of the session.  Sly demonstrated good  understanding of the education provided.     See EMR under Media for exercises provided 7/24/2019.    Assessment   A:  Pt progressing well  Increased ROM. Progress quad strengthening and gait training.    Sly is progressing well towards his goals.   Pt prognosis is Excellent.     Pt will continue to benefit from skilled outpatient physical therapy to address the deficits listed in the problem list box on initial evaluation, provide pt/family education and to maximize pt's level of independence in the home and community environment.   Pt's spiritual, cultural and educational needs considered and pt agreeable to plan of care and goals.     Anticipated barriers to physical therapy: adverse reactions to pain medications and anesthesia    Goals:  Short Term Goals: (3 weeks)  1. Pt will be independent with HEP supplement PT in improving functional mobility. - progressing  2. Pt will improve LLE strength to at least 75% of R LE strength as measured via [employer] handheld dynamometer in order to improve functional mobility - progressing  3. Pt will improve L knee AROM to at least 0-90 degrees in order to improve gait - progressing     Long Term Goals: (6 weeks)  1. Pt will be independent with updated HEP supplement PT in improving functional mobility. - progressing  2. Pt will improve L LE strength to at least 90% of R LE strength as measured via [employer] handheld dynamometer in order to improve functional mobility - progressing  3. Pt will improve L knee AROM to at least 0-115 degrees in order to improve gait and ability to perform ADLs - progressing  4.  Pt will improve FOTO knee survey score to </= 48% limited in order to demo improved functional mobility - progressing  5. Pt will improve score on Function,Daily Living section of KOOS to at least 34/68 - progressing   (50% limited) in order to demo improved functional mobility - progressing  6. Pt will perform TUG in < 10 seconds without AD in order to demo improved gait speed - progressing  7. Pt will perform at least 10 sit to stands without UE support on 30 second sit to stand test in order to demo improved ability to perform transfers - progressing    Plan   Dasa protocol.      José Luis Mike, PTA

## 2019-08-01 ENCOUNTER — CLINICAL SUPPORT (OUTPATIENT)
Dept: REHABILITATION | Facility: HOSPITAL | Age: 79
End: 2019-08-01
Payer: MEDICARE

## 2019-08-01 DIAGNOSIS — R53.1 DECREASED STRENGTH: ICD-10-CM

## 2019-08-01 DIAGNOSIS — Z74.09 DECREASED MOBILITY AND ENDURANCE: ICD-10-CM

## 2019-08-01 DIAGNOSIS — M25.562 ACUTE PAIN OF LEFT KNEE: ICD-10-CM

## 2019-08-01 PROCEDURE — 97110 THERAPEUTIC EXERCISES: CPT | Mod: PN

## 2019-08-01 PROCEDURE — 97140 MANUAL THERAPY 1/> REGIONS: CPT | Mod: PN

## 2019-08-01 PROCEDURE — 97112 NEUROMUSCULAR REEDUCATION: CPT | Mod: PN

## 2019-08-01 NOTE — PROGRESS NOTES
Physical Therapy Daily Treatment Note     Name: Sly Villarreal  Clinic Number: [id number]    Therapy Diagnosis:   Encounter Diagnoses   Name Primary?    Acute pain of left knee     Decreased strength     Decreased mobility and endurance      Physician: Marcus Bro MD    Visit Date: 8/1/2019    Physician Orders: PT Eval and Treat  Medical Diagnosis from Referral: M17.12 (ICD-10-CM) - Primary osteoarthritis of left knee  Evaluation Date: 7/23/2019  Authorization Period Expiration: 12/31/2019  Plan of Care Expiration: 9/17/19    Visit # / Visits authorized: 6/50  FOTO: 6/10  Gcode: 6/10  Visit:  119.57   Total: 1499.4     Time In: 1000  Time Out: 1105  Total Billable Time: 55 minutes (2 TE, 1 MT, 1 NM)  Precautions: Standard, DM II, past R TKA    Subjective     Pt reports: to PT today ambulating with RW.  Still with Long catheter until 08/07/2019.  Presents to PT with his adult daughter who helps interpret.   He was compliant with home exercise program.  Response to previous treatment: no complaints  Functional change: no change  Pain: 7-8/10  Location: left knee      Objective   O:  L knee AROM: (-) 6 - 90 degrees   PROM: (-) 3 - 110 degrees        Quad set quality:  good    Sly received therapeutic exercises to develop strength, endurance, ROM, flexibility and posture for 30 minutes with PT 1:1 including assessment:  -Quad sets:     2x10 L 5'' holds, towel under heel  -SAQs     2x10 B  -SL hip abd:     held today due to Long catheter bag attached non-op RLE  -HSS:      3x30'' L c/ strap  -Heel slides:     Not performed today  -SLR:      2x10 L  -Seated marching:    2x10 B  -LAQ:      2x10 B  - bike     5' partial revolutions    Sly received the following manual therapy techniques: Joint mobilizations, Manual traction, Myofacial release, Manual Lymphatic Drainage and Soft tissue Mobilization were applied to the: L knee for 15 minutes:  - MLD and STM/MFR to L lower leg and upper leg  - L patellar mobs  Grade II-III  - grade II/III/IV L knee passive physiological flexion/extension mobs in short-sitting  - PROM L hip all planes    Sly received the following neuromuscular re-education for balance, kinesthesic, and proprioception training: total time 10 minutes:  - lateral stepping B 4 x 10 reps  - mini-squats/quarter squats; 2x10; cueing for depth and even weight bearing  - standing marching with single UE support; 3x10      Sly received cold pack for 10 minutes to L knee with foot propped on traction stool to decrease pain and edema.      Home Exercises Provided and Patient Education Provided   Education provided:   - contiue HEP  - do not submerse surgical wound in bath water.    - educate on steri-strips.    - work in standing and walking with good wieghtbearing using RW for now    Written Home Exercises Provided: yes.  Exercises were reviewed and Sly was able to demonstrate them prior to the end of the session.  Sly demonstrated good  understanding of the education provided.     See EMR under Media for exercises provided 7/24/2019.    Assessment   A:  Pt still with Long catheter.  S/p L TKA.  Post-op week 2.     Sly is progressing well towards his goals.   Pt prognosis is Excellent.     Pt will continue to benefit from skilled outpatient physical therapy to address the deficits listed in the problem list box on initial evaluation, provide pt/family education and to maximize pt's level of independence in the home and community environment.   Pt's spiritual, cultural and educational needs considered and pt agreeable to plan of care and goals.     Anticipated barriers to physical therapy: adverse reactions to pain medications and anesthesia    Goals:  Short Term Goals: (3 weeks)  1. Pt will be independent with HEP supplement PT in improving functional mobility. - progressing  2. Pt will improve LLE strength to at least 75% of R LE strength as measured via [employer] handheld dynamometer in  order to improve functional mobility - progressing  3. Pt will improve L knee AROM to at least 0-90 degrees in order to improve gait - progressing     Long Term Goals: (6 weeks)  1. Pt will be independent with updated HEP supplement PT in improving functional mobility. - progressing  2. Pt will improve L LE strength to at least 90% of R LE strength as measured via [employer] handheld dynamometer in order to improve functional mobility - progressing  3. Pt will improve L knee AROM to at least 0-115 degrees in order to improve gait and ability to perform ADLs - progressing  4. Pt will improve FOTO knee survey score to </= 48% limited in order to demo improved functional mobility - progressing  5. Pt will improve score on Function,Daily Living section of KOOS to at least 34/68 - progressing   (50% limited) in order to demo improved functional mobility - progressing  6. Pt will perform TUG in < 10 seconds without AD in order to demo improved gait speed - progressing  7. Pt will perform at least 10 sit to stands without UE support on 30 second sit to stand test in order to demo improved ability to perform transfers - progressing    Plan   Dasa protocol TKA.  Phase I -> II.  Straight cane gait training trial.     Jonh Escudero, PT

## 2019-08-02 ENCOUNTER — CLINICAL SUPPORT (OUTPATIENT)
Dept: REHABILITATION | Facility: HOSPITAL | Age: 79
End: 2019-08-02
Payer: MEDICARE

## 2019-08-02 DIAGNOSIS — R53.1 DECREASED STRENGTH: ICD-10-CM

## 2019-08-02 DIAGNOSIS — Z74.09 DECREASED MOBILITY AND ENDURANCE: ICD-10-CM

## 2019-08-02 DIAGNOSIS — M25.562 ACUTE PAIN OF LEFT KNEE: ICD-10-CM

## 2019-08-02 PROCEDURE — 97112 NEUROMUSCULAR REEDUCATION: CPT | Mod: PN

## 2019-08-02 PROCEDURE — 97116 GAIT TRAINING THERAPY: CPT | Mod: PN

## 2019-08-02 PROCEDURE — 97110 THERAPEUTIC EXERCISES: CPT | Mod: PN

## 2019-08-02 NOTE — PROGRESS NOTES
Physical Therapy Daily Treatment Note     Name: Sly Villarreal  Clinic Number: [id number]    Therapy Diagnosis:   Encounter Diagnoses   Name Primary?    Acute pain of left knee     Decreased strength     Decreased mobility and endurance      Physician: Marcus Bro MD    Visit Date: 8/2/2019    Physician Orders: PT Eval and Treat  Medical Diagnosis from Referral: M17.12 (ICD-10-CM) - Primary osteoarthritis of left knee  Evaluation Date: 7/23/2019  Authorization Period Expiration: 12/31/2019  Plan of Care Expiration: 9/17/19    Visit # / Visits authorized: 7/50  FOTO: 7/10  Gcode: 7/10  Visit: 94.77    Total: 1594.17     Time In: 1000  Time Out: 1110  Total Billable Time: 45minutes (1 TE, 1 NMR, 1 GT)  Precautions: Standard, DM II, past R TKA    Subjective     Pt reports: to PT using RW and SPC with daughter. His l knee has been feeling better and no issues with nausea and vomiting.  He was compliant with home exercise program.  Response to previous treatment: no complaints  Functional change: no change  Pain: 6/10  Location: left knee      Objective   O:  L knee AROM: (-) 4-110 degrees   PROM: (-) 2-112 degrees        Quad set quality:  good    Sly received therapeutic exercises to develop strength, endurance, ROM, flexibility and posture for 15 minutes supervised and 15 mins 1:1 with PT:  -Bike     5' full revolutions  -Quad sets:     2x10 L 5'' holds, towel under heel  -SAQs     3x10 B 1# L  -SL hip abd:     held today due to Long catheter bag attached non-op RLE  -HSS:      4x30'' L c/ strap  -knee flexion stretch   6x10'' L c/ double strap  -SLR:      3x10 L  -Seated marching:    2x10 B - not performed today  -LAQ:      2x10 B - not performed today  -Step ups    2x10 L, L1, 2 HR use    Syl received the following manual therapy techniques: Joint mobilizations, Manual traction, Myofacial release, Manual Lymphatic Drainage and Soft tissue Mobilization were applied to the: L knee for 0 minutes: Not  performed today  - MLD and STM/MFR to L lower leg and upper leg  - L patellar mobs Grade II-III  - grade II/III/IV L knee passive physiological flexion/extension mobs in short-sitting  - PROM L hip all planes    Munira received the following gait training for 15 minutes consisting of:  - forward walking with SPC for 40' x 2 in 3 point gait pattern   Cues for upright posture, increased right step length, increase chantal    Sly received the following neuromuscular re-education for balance, kinesthesic, and proprioception training: total time 10 minutes:  - lateral stepping B 4 x 10 reps  - mini-squats/quarter squats; 2x10; cueing for depth and even weight bearing  - standing marching with single UE support; 3x10      Sly received cold pack for 10 minutes to L knee with foot propped on traction stool to decrease pain and edema.      Home Exercises Provided and Patient Education Provided   Education provided:   - contiue HEP  - do not submerse surgical wound in bath water.    - educate on steri-strips.    - work in standing and walking with good wieghtbearing using RW for now    Written Home Exercises Provided: yes.  Exercises were reviewed and Sly was able to demonstrate them prior to the end of the session.  Sly demonstrated good  understanding of the education provided.     See EMR under Media for exercises provided 7/24/2019.    Assessment   A:  Doing very well and pt responded to cueing fairly well demonstrating near equal step length and chantal. Continue gait training and upright mobility/strengthening.     Sly is progressing well towards his goals.   Pt prognosis is Excellent.     Pt will continue to benefit from skilled outpatient physical therapy to address the deficits listed in the problem list box on initial evaluation, provide pt/family education and to maximize pt's level of independence in the home and community environment.   Pt's spiritual, cultural and educational needs  considered and pt agreeable to plan of care and goals.     Anticipated barriers to physical therapy: adverse reactions to pain medications and anesthesia    Goals:  Short Term Goals: (3 weeks)  1. Pt will be independent with HEP supplement PT in improving functional mobility. - progressing  2. Pt will improve LLE strength to at least 75% of R LE strength as measured via MicroFet handheld dynamometer in order to improve functional mobility - progressing  3. Pt will improve L knee AROM to at least 0-90 degrees in order to improve gait - progressing     Long Term Goals: (6 weeks)  1. Pt will be independent with updated HEP supplement PT in improving functional mobility. - progressing  2. Pt will improve L LE strength to at least 90% of R LE strength as measured via MicroFet handheld dynamometer in order to improve functional mobility - progressing  3. Pt will improve L knee AROM to at least 0-115 degrees in order to improve gait and ability to perform ADLs - progressing  4. Pt will improve FOTO knee survey score to </= 48% limited in order to demo improved functional mobility - progressing  5. Pt will improve score on Function,Daily Living section of KOOS to at least 34/68 - progressing   (50% limited) in order to demo improved functional mobility - progressing  6. Pt will perform TUG in < 10 seconds without AD in order to demo improved gait speed - progressing  7. Pt will perform at least 10 sit to stands without UE support on 30 second sit to stand test in order to demo improved ability to perform transfers - progressing    Plan   Dasa protocol TKA.  Phase I -> II.  Straight cane gait training trial.     Rufus Santa, PT

## 2019-08-09 ENCOUNTER — TELEPHONE (OUTPATIENT)
Dept: REHABILITATION | Facility: HOSPITAL | Age: 79
End: 2019-08-09

## 2019-08-09 DIAGNOSIS — M25.562 ACUTE PAIN OF LEFT KNEE: ICD-10-CM

## 2019-08-09 DIAGNOSIS — R53.1 DECREASED STRENGTH: ICD-10-CM

## 2019-08-09 DIAGNOSIS — Z74.09 DECREASED MOBILITY AND ENDURANCE: ICD-10-CM

## 2019-08-12 ENCOUNTER — CLINICAL SUPPORT (OUTPATIENT)
Dept: REHABILITATION | Facility: HOSPITAL | Age: 79
End: 2019-08-12
Payer: MEDICARE

## 2019-08-12 DIAGNOSIS — R53.1 DECREASED STRENGTH: ICD-10-CM

## 2019-08-12 DIAGNOSIS — Z74.09 DECREASED MOBILITY AND ENDURANCE: ICD-10-CM

## 2019-08-12 DIAGNOSIS — M25.562 ACUTE PAIN OF LEFT KNEE: ICD-10-CM

## 2019-08-12 PROCEDURE — 97110 THERAPEUTIC EXERCISES: CPT | Mod: PN

## 2019-08-12 NOTE — PROGRESS NOTES
Physical Therapy Daily Treatment Note     Name: Sly Villarreal  Clinic Number: [id number]    Therapy Diagnosis:   Encounter Diagnoses   Name Primary?    Acute pain of left knee     Decreased strength     Decreased mobility and endurance      Physician: Marcus Bro MD    Visit Date: 8/12/2019    Physician Orders: PT Eval and Treat  Medical Diagnosis from Referral: M17.12 (ICD-10-CM) - Primary osteoarthritis of left knee  Evaluation Date: 7/23/2019  Authorization Period Expiration: 12/31/2019  Plan of Care Expiration: 9/17/19    Visit # / Visits authorized: 8/50  FOTO: 8/10  Gcode: 8/10  Visit: 60.64    Total: 1654.81     Time In: 1005  Time Out: 1110  Total Billable Time: 30 minutes (2 TE)  Precautions: Standard, DM II, past R TKA    Subjective     Pt reports: he was admitted to [facility] last Wednesday and was discharged last Friday night due to low sodium levels and passing out last Wednesday while at a restaurant. They missed his ortho appoitment with Dr. Bro, but have been doing some exercises when they can. Listed cell phone number that was called last week is still good, pt unable to call back due to time of day when review message.   He was compliant with home exercise program.  Response to previous treatment: no complaints  Functional change: no change  Pain: 6/10  Location: left knee      Objective     Sly received therapeutic exercises to develop strength, endurance, ROM, flexibility and posture for 30 minutes supervised and 30 mins 1:1 with PT:  -Bike     5' full revolutions  -Quad sets:     10x10 L 5'' holds, towel under heel  -SL hip abd:     held today due to Long catheter bag attached non-op RLE  -HSS:      4x30'' L c/ strap  -knee flexion stretch   6x10'' L c/ double strap - not performed today  -SLR:      3x10 L  -LAQ:      2x10 L 1#  -Step ups    2x10 L, L1, 2 HR use  -Lateral step downs   10x L, L1, 2 HR use  -Heel raises    2x10 DL  -Calf fitter stretch   3x30'' DL  -Stair negotiation   2x8  steps with SPC and 1 HR use at SBA-SPC at step to gait pattern.    Sly received the following manual therapy techniques: Joint mobilizations, Manual traction, Myofacial release, Manual Lymphatic Drainage and Soft tissue Mobilization were applied to the: L knee for 0 minutes: Not performed today  - MLD and STM/MFR to L lower leg and upper leg  - L patellar mobs Grade II-III  - grade II/III/IV L knee passive physiological flexion/extension mobs in short-sitting  - PROM L hip all planes    Munira received the following gait training for 0 minutes consisting of:  - forward walking with SPC for 40' x 2 in 3 point gait pattern   Cues for upright posture, increased right step length, increase chantal    Sly received the following neuromuscular re-education for balance, kinesthesic, and proprioception training: total time 0 minutes:  - lateral stepping B 4 x 10 reps  - mini-squats/quarter squats; 2x10; cueing for depth and even weight bearing  - standing marching with single UE support; 3x10      Sly received cold pack for 10 minutes to L knee with foot propped on traction stool to decrease pain and edema.    L knee ROM/MMT  AROM: 2-113  PROM: 0-116    L/E Strength w/ MicroFET Muscle Letty Dynamometer Right Left Pain/Dysfunction with Movement   (approx 4 sec hold w/ max contraction)   Hip Flexion 10.3 kg  10.3 kg     Hip Abduction 17.5 kg  11.5 kg     Quadriceps 18.0 kg  10.3 kg     Hamstrings 11.0 kg  9.9 kg       TU seconds c/ SPC  30 second sit-to-stand test (without U/E support): 6 with BUE support    Home Exercises Provided and Patient Education Provided   Education provided:   - contiue HEP  - do not submerse surgical wound in bath water.    - educate on steri-strips.    - work in standing and walking with good wieghtbearing using RW for now    Written Home Exercises Provided: yes.  Exercises were reviewed and Sly was able to demonstrate them prior to the end of the session.  Sly  demonstrated good  understanding of the education provided.     See EMR under Media for exercises provided 7/24/2019.    Assessment   A:  Pt demonstrated alight improved L knee flexion and extension despite missing all of last week therapy appointments. Pt still on track and doing well overall, pt appeared a little more fatigued than normal throughout session as well, and monitor response during and after for any abnormalities: take vitals as needed. Advance standing activities and focus on completing L knee extension.    Sly is progressing well towards his goals.   Pt prognosis is Excellent.     Pt will continue to benefit from skilled outpatient physical therapy to address the deficits listed in the problem list box on initial evaluation, provide pt/family education and to maximize pt's level of independence in the home and community environment.   Pt's spiritual, cultural and educational needs considered and pt agreeable to plan of care and goals.     Anticipated barriers to physical therapy: adverse reactions to pain medications and anesthesia    Goals:  Short Term Goals: (3 weeks)  1. Pt will be independent with HEP supplement PT in improving functional mobility. - progressing  2. Pt will improve LLE strength to at least 75% of R LE strength as measured via MicroFet handheld dynamometer in order to improve functional mobility - progressing  3. Pt will improve L knee AROM to at least 0-90 degrees in order to improve gait - progressing     Long Term Goals: (6 weeks)  1. Pt will be independent with updated HEP supplement PT in improving functional mobility. - progressing  2. Pt will improve L LE strength to at least 90% of R LE strength as measured via MicroFet handheld dynamometer in order to improve functional mobility - progressing  3. Pt will improve L knee AROM to at least 0-115 degrees in order to improve gait and ability to perform ADLs - progressing  4. Pt will improve FOTO knee survey score to </=  48% limited in order to demo improved functional mobility - progressing  5. Pt will improve score on Function,Daily Living section of KOOS to at least 34/68 - progressing   (50% limited) in order to demo improved functional mobility - progressing  6. Pt will perform TUG in < 10 seconds without AD in order to demo improved gait speed - progressing  7. Pt will perform at least 10 sit to stands without UE support on 30 second sit to stand test in order to demo improved ability to perform transfers - progressing    Plan   Dasa protocol TKA.  Phase I -> II.  Straight cane gait training trial.     Rufus Santa, PT

## 2019-08-14 ENCOUNTER — CLINICAL SUPPORT (OUTPATIENT)
Dept: REHABILITATION | Facility: HOSPITAL | Age: 79
End: 2019-08-14
Payer: MEDICARE

## 2019-08-14 DIAGNOSIS — M25.562 ACUTE PAIN OF LEFT KNEE: ICD-10-CM

## 2019-08-14 DIAGNOSIS — R53.1 DECREASED STRENGTH: ICD-10-CM

## 2019-08-14 DIAGNOSIS — Z74.09 DECREASED MOBILITY AND ENDURANCE: ICD-10-CM

## 2019-08-14 PROCEDURE — 97110 THERAPEUTIC EXERCISES: CPT | Mod: PN

## 2019-08-14 NOTE — PROGRESS NOTES
Physical Therapy Daily Treatment Note     Name: Sly Villarreal  Clinic Number: [id number]    Therapy Diagnosis:   Encounter Diagnoses   Name Primary?    Acute pain of left knee     Decreased strength     Decreased mobility and endurance      Physician: Marcus Bro MD    Visit Date: 8/14/2019    Physician Orders: PT Eval and Treat  Medical Diagnosis from Referral: M17.12 (ICD-10-CM) - Primary osteoarthritis of left knee  Evaluation Date: 7/23/2019  Authorization Period Expiration: 12/31/2019  Plan of Care Expiration: 9/17/19    Visit # / Visits authorized: 9/50  FOTO: 9/10  Gcode: 9/10  Visit: 60.64    Total: 1715.45     Time In: 1020  Time Out: 1110  Total Billable Time: 30 minutes (2 TE)  Precautions: Standard, DM II, past R TKA    Subjective     Pt reports: pt and daughter arrived 20 minutes late today and she said she had to convince him to come to therapy today as he is not feeling well along with some increased L knee pain. Feels warm, but was negative for a fever or any increased swelling or redness at incision site.  He was compliant with home exercise program.  Response to previous treatment: no complaints  Functional change: no change  Pain: 8/10  Location: left knee      Objective     Sly received therapeutic exercises to develop strength, endurance, ROM, flexibility and posture for 10 minutes supervised and 30 mins 1:1 with PT:  -Bike     5' full revolutions  -Quad sets:     10x10 L 5'' holds, towel under heel - not performed today  -SL hip abd:     held today due to Long catheter bag attached non-op RLE - not performed today  -HSS:      4x30'' L c/ strap - not performed today  -knee flexion stretch   6x10'' L c/ double strap - not performed today - not performed today  -SLR:      3x10 L - not performed today  -LAQ:      2x10 L 1# - not performed today  -Step ups    2x10 L, L1, 2 HR use  -Lateral step downs   10x L, L1, 2 HR use  -Heel raises    2x10 DL  -Calf fitter stretch   3x30'' DL  -Leg  press    2x10 DL, 4 plates    Heart received the following manual therapy techniques: Joint mobilizations, Manual traction, Myofacial release, Manual Lymphatic Drainage and Soft tissue Mobilization were applied to the: L knee for 0 minutes: Not performed today  - MLD and STM/MFR to L lower leg and upper leg  - L patellar mobs Grade II-III  - grade II/III/IV L knee passive physiological flexion/extension mobs in short-sitting  - PROM L hip all planes    Muhammed received the following gait training for 0 minutes consisting of:  - forward walking with SPC for 40' x 2 in 3 point gait pattern   Cues for upright posture, increased right step length, increase chantal    Heart received the following neuromuscular re-education for balance, kinesthesic, and proprioception training: total time 0 minutes:  - lateral stepping B 4 x 10 reps  - mini-squats/quarter squats; 2x10; cueing for depth and even weight bearing  - standing marching with single UE support; 3x10      Heart received cold pack for 10 minutes to L knee with foot propped on traction stool to decrease pain and edema.    L knee ROM/MMT  AROM: 2-113  PROM: 0-116    L/E Strength w/ MicroFET Muscle Letty Dynamometer Right Left Pain/Dysfunction with Movement   (approx 4 sec hold w/ max contraction)   Hip Flexion 10.3 kg  10.3 kg     Hip Abduction 17.5 kg  11.5 kg     Quadriceps 18.0 kg  10.3 kg     Hamstrings 11.0 kg  9.9 kg       TU seconds c/ SPC  30 second sit-to-stand test (without U/E support): 6 with BUE support    Home Exercises Provided and Patient Education Provided   Education provided:   - contiue HEP  - do not submerse surgical wound in bath water.    - educate on steri-strips.    - work in standing and walking with good wieghtbearing using RW for now    Written Home Exercises Provided: yes.  Exercises were reviewed and Sly was able to demonstrate them prior to the end of the session.  Sly demonstrated good  understanding of the  education provided.     See EMR under Media for exercises provided 7/24/2019.    Assessment   A:  Pt stayed for an extra 10 minutes to rest after leg press as pt was dizzy after getting off the leg press. Pt was given water jamal crackers, and apple sauce; pt and daughter confirmed that he has not been eating much and his appetite has been down. Pt did well and standing activities was focused on today as pt still on track for protocol. Resume exercises next visit and assess vitals as needed.    Sly is progressing well towards his goals.   Pt prognosis is Excellent.     Pt will continue to benefit from skilled outpatient physical therapy to address the deficits listed in the problem list box on initial evaluation, provide pt/family education and to maximize pt's level of independence in the home and community environment.   Pt's spiritual, cultural and educational needs considered and pt agreeable to plan of care and goals.     Anticipated barriers to physical therapy: adverse reactions to pain medications and anesthesia    Goals:  Short Term Goals: (3 weeks)  1. Pt will be independent with HEP supplement PT in improving functional mobility. - progressing  2. Pt will improve LLE strength to at least 75% of R LE strength as measured via MicroFet handheld dynamometer in order to improve functional mobility - progressing  3. Pt will improve L knee AROM to at least 0-90 degrees in order to improve gait - progressing     Long Term Goals: (6 weeks)  1. Pt will be independent with updated HEP supplement PT in improving functional mobility. - progressing  2. Pt will improve L LE strength to at least 90% of R LE strength as measured via MicroFet handheld dynamometer in order to improve functional mobility - progressing  3. Pt will improve L knee AROM to at least 0-115 degrees in order to improve gait and ability to perform ADLs - progressing  4. Pt will improve FOTO knee survey score to </= 48% limited in order to demo  improved functional mobility - progressing  5. Pt will improve score on Function,Daily Living section of KOOS to at least 34/68 - progressing   (50% limited) in order to demo improved functional mobility - progressing  6. Pt will perform TUG in < 10 seconds without AD in order to demo improved gait speed - progressing  7. Pt will perform at least 10 sit to stands without UE support on 30 second sit to stand test in order to demo improved ability to perform transfers - progressing    Plan   Dasa protocol TKA.  Phase I -> II.  Straight cane gait training trial.     Rufus Santa, PT

## 2019-08-16 ENCOUNTER — CLINICAL SUPPORT (OUTPATIENT)
Dept: REHABILITATION | Facility: HOSPITAL | Age: 79
End: 2019-08-16
Payer: MEDICARE

## 2019-08-16 DIAGNOSIS — M25.562 ACUTE PAIN OF LEFT KNEE: ICD-10-CM

## 2019-08-16 DIAGNOSIS — Z74.09 DECREASED MOBILITY AND ENDURANCE: ICD-10-CM

## 2019-08-16 DIAGNOSIS — R53.1 DECREASED STRENGTH: ICD-10-CM

## 2019-08-16 PROCEDURE — 97110 THERAPEUTIC EXERCISES: CPT | Mod: PN

## 2019-08-16 PROCEDURE — G8979 MOBILITY GOAL STATUS: HCPCS | Mod: CK,PN

## 2019-08-16 PROCEDURE — G8978 MOBILITY CURRENT STATUS: HCPCS | Mod: CK,PN

## 2019-08-16 NOTE — PROGRESS NOTES
Physical Therapy Daily Treatment Note     Name: Sly Villarreal  Clinic Number: [id number]    Therapy Diagnosis:   Encounter Diagnoses   Name Primary?    Acute pain of left knee     Decreased strength     Decreased mobility and endurance      Physician: Marcus Bro MD    Visit Date: 8/16/2019    Physician Orders: PT Eval and Treat  Medical Diagnosis from Referral: M17.12 (ICD-10-CM) - Primary osteoarthritis of left knee  Evaluation Date: 7/23/2019  Authorization Period Expiration: 12/31/2019  Plan of Care Expiration: 9/17/19    Visit # / Visits authorized: 10/50  FOTO: 10/10 DONE  Gcode: 10/10 DONE  Visit: 60.64    Total: 1776.09     Time In: 1005  Time Out: 1110  Total Billable Time: 30 minutes (2 TE)  Precautions: Standard, DM II, past R TKA    Subjective     Pt reports: Pt reports some moderate L knee pain and trouble sleeping lately. Pt appears tired.  He was compliant with home exercise program.  Response to previous treatment: no complaints  Functional change: no change  Pain: 8/10  Location: left knee      Objective     Sly received therapeutic exercises to develop strength, endurance, ROM, flexibility and posture for 25 minutes supervised and 30 mins 1:1 with PT:  -Bike     5' full revolutions  -Quad sets:     10x10 L 5'' holds, towel under heel  -SL hip abd:     held today due to Long catheter bag attached non-op RLE  -HSS:      4x30'' L c/ strap  -knee flexion stretch   6x10'' L c/ double strap  -SLR:      3x10 L  -LAQ:      2x10 L 1#  -Step ups    2x10 L, L1, 2 HR use  -Lateral step downs   10x L, L1, 2 HR use  -Heel raises    2x10 DL  -Calf fitter stretch   3x30'' DL  -Leg press    2x10 DL, 4 plates    Sly received the following manual therapy techniques: Joint mobilizations, Manual traction, Myofacial release, Manual Lymphatic Drainage and Soft tissue Mobilization were applied to the: L knee for 0 minutes: Not performed today  - MLD and STM/MFR to L lower leg and upper leg  - L patellar  mobs Grade II-III  - grade II/III/IV L knee passive physiological flexion/extension mobs in short-sitting  - PROM L hip all planes    Munira received the following gait training for 0 minutes consisting of:  - forward walking with SPC for 40' x 2 in 3 point gait pattern   Cues for upright posture, increased right step length, increase chantal    Heart received the following neuromuscular re-education for balance, kinesthesic, and proprioception training: total time 0 minutes:  - lateral stepping B 4 x 10 reps  - mini-squats/quarter squats; 2x10; cueing for depth and even weight bearing  - standing marching with single UE support; 3x10      Heart received cold pack for 10 minutes to L knee with foot propped on traction stool to decrease pain and edema.    L knee ROM/MMT  AROM: 2-113  PROM: 0-116    L/E Strength w/ MicroFET Muscle Letty Dynamometer Right Left Pain/Dysfunction with Movement   (approx 4 sec hold w/ max contraction)   Hip Flexion 10.3 kg  10.3 kg     Hip Abduction 17.5 kg  11.5 kg     Quadriceps 18.0 kg  10.3 kg     Hamstrings 11.0 kg  9.9 kg       TU seconds c/ SPC  30 second sit-to-stand test (without U/E support): 6 with BUE support    KOOS:  = 41.2 % disability level    Home Exercises Provided and Patient Education Provided   Education provided:   - contiue HEP  - do not submerse surgical wound in bath water.    - educate on steri-strips.    - work in standing and walking with good wieghtbearing using RW for now    Written Home Exercises Provided: yes.  Exercises were reviewed and Sly was able to demonstrate them prior to the end of the session.  Sly demonstrated good  understanding of the education provided.     See EMR under Media for exercises provided 2019.    Assessment   A:  Pt appeared fairly fatigued after session and requires supervision for transitions due to pt dizziness and blood pressure drops. Pt doing well overall meeting 2 week goal for L knee AROM.  Progress knee flexion and finish off extension. Progress gait training without SPC and standing exercise endurance.     Sly is progressing well towards his goals.   Pt prognosis is Excellent.     Pt will continue to benefit from skilled outpatient physical therapy to address the deficits listed in the problem list box on initial evaluation, provide pt/family education and to maximize pt's level of independence in the home and community environment.   Pt's spiritual, cultural and educational needs considered and pt agreeable to plan of care and goals.     Anticipated barriers to physical therapy: adverse reactions to pain medications and anesthesia    Goals:  Short Term Goals: (3 weeks)  1. Pt will be independent with HEP supplement PT in improving functional mobility. - progressing  2. Pt will improve LLE strength to at least 75% of R LE strength as measured via MicroFet handheld dynamometer in order to improve functional mobility - progressing  3. Pt will improve L knee AROM to at least 0-90 degrees in order to improve gait - progressing     Long Term Goals: (6 weeks)  1. Pt will be independent with updated HEP supplement PT in improving functional mobility. - progressing  2. Pt will improve L LE strength to at least 90% of R LE strength as measured via MicroFet handheld dynamometer in order to improve functional mobility - progressing  3. Pt will improve L knee AROM to at least 0-115 degrees in order to improve gait and ability to perform ADLs - progressing  4. Pt will improve FOTO knee survey score to </= 48% limited in order to demo improved functional mobility - progressing  5. Pt will improve score on Function,Daily Living section of KOOS to at least 34/68 - progressing   (50% limited) in order to demo improved functional mobility - progressing  6. Pt will perform TUG in < 10 seconds without AD in order to demo improved gait speed - progressing  7. Pt will perform at least 10 sit to stands without UE  support on 30 second sit to stand test in order to demo improved ability to perform transfers - progressing    Plan   Dasa protocol TKA.     Rufus Santa, PT

## 2019-08-19 ENCOUNTER — CLINICAL SUPPORT (OUTPATIENT)
Dept: REHABILITATION | Facility: HOSPITAL | Age: 79
End: 2019-08-19
Payer: MEDICARE

## 2019-08-19 DIAGNOSIS — R53.1 DECREASED STRENGTH: ICD-10-CM

## 2019-08-19 DIAGNOSIS — Z74.09 DECREASED MOBILITY AND ENDURANCE: ICD-10-CM

## 2019-08-19 DIAGNOSIS — M25.562 ACUTE PAIN OF LEFT KNEE: ICD-10-CM

## 2019-08-19 PROCEDURE — 97110 THERAPEUTIC EXERCISES: CPT | Mod: PN

## 2019-08-19 PROCEDURE — 97116 GAIT TRAINING THERAPY: CPT | Mod: PN

## 2019-08-19 NOTE — PROGRESS NOTES
Physical Therapy Daily Treatment Note     Name: Sly Villarreal  Clinic Number: [id number]    Therapy Diagnosis:   Encounter Diagnoses   Name Primary?    Acute pain of left knee     Decreased strength     Decreased mobility and endurance      Physician: Marcus Bro MD    Visit Date: 8/19/2019    Physician Orders: PT Eval and Treat  Medical Diagnosis from Referral: M17.12 (ICD-10-CM) - Primary osteoarthritis of left knee  Evaluation Date: 7/23/2019  Authorization Period Expiration: 12/31/2019  Plan of Care Expiration: 9/17/19    Visit # / Visits authorized: 11/50  FOTO: 1/10  Gcode: 1/10  Visit: 120.42   Total: 1897.09     Time In: 1000  Time Out: 1110  Total Billable Time: 60 minutes (3 TE, 1 GT)  Precautions: Standard, DM II, past R TKA    Subjective     Pt reports: his knee is feeling better and doing well overall today, daughter present as always with patient for translation purposes.  He was compliant with home exercise program.  Response to previous treatment: no complaints  Functional change: no change  Pain: 8/10  Location: left knee      Objective     Sly received therapeutic exercises to develop strength, endurance, ROM, flexibility and posture for 45 minutes 1:1 with PT:  -Bike     5' full revolutions  -HSS:      4x30'' L c/ strap - not performed today  -Knee flexion stretch   8x10'' L c/ double strap c/ PT overpressure  -SLR:      3x10 L - not performed today  -Step ups    2x10 L, Blue, 1 HR use  -Lateral step downs   3x10 L, L1, 1 HR use  -Heel raises    2x20 DL  -Calf fitter stretch   3x30'' DL - not performed today  -Standing hip abd   Next   -Leg press    3x10 DL, 4 plates    Sly received the following manual therapy techniques: Joint mobilizations, Manual traction, Myofacial release, Manual Lymphatic Drainage and Soft tissue Mobilization were applied to the: L knee for 0 minutes: Not performed today  - MLD and STM/MFR to L lower leg and upper leg  - L patellar mobs Grade II-III  - grade  II/III/IV L knee passive physiological flexion/extension mobs in short-sitting  - PROM L hip all planes    Munira received the following gait training for 15 minutes consisting of:  - lateral walking in // bars, 5 laps, no UE use  - walking over hurdles in // bars, 5 laps, minimal UE use  - walking 1 lap around gym (140') without AD    Sly received cold pack for 10 minutes to L knee with foot propped on traction stool to decrease pain and edema.    L knee ROM/MMT: 19  AROM: 1-110  PROM: 0-115    L/E Strength w/ MicroFET Muscle Letty Dynamometer Right Left Pain/Dysfunction with Movement   (approx 4 sec hold w/ max contraction)   Hip Flexion 12.2 kg  13.6 kg    Hip Abduction 13.4 kg  10.7 kg    Quadriceps 16.0 kg  13.5 kg     Hamstrings 14.5 kg  10.8 kg       TU seconds without AD  30 second sit-to-stand test (without U/E support): 5 with BUE support    Home Exercises Provided and Patient Education Provided   Education provided:   - contiue HEP  - do not submerse surgical wound in bath water.    - educate on steri-strips.    - work in standing and walking with good wieghtbearing using RW for now    Written Home Exercises Provided: yes.  Exercises were reviewed and Sly was able to demonstrate them prior to the end of the session.  Sly demonstrated good  understanding of the education provided.     See EMR under Media for exercises provided 2019.    Assessment   A:  Pt did well today and tolerated all increased reps/weight very well today. Pt still needs monitoring for orthostatic hypotension especially when getting off of leg press. Progress to standing exercises and finish off knee extension active ROM.    Sly is progressing well towards his goals.   Pt prognosis is Excellent.     Pt will continue to benefit from skilled outpatient physical therapy to address the deficits listed in the problem list box on initial evaluation, provide pt/family education and to maximize pt's level  of independence in the home and community environment.   Pt's spiritual, cultural and educational needs considered and pt agreeable to plan of care and goals.     Anticipated barriers to physical therapy: adverse reactions to pain medications and anesthesia    Goals:  Short Term Goals: (3 weeks)  1. Pt will be independent with HEP supplement PT in improving functional mobility. - progressing  2. Pt will improve LLE strength to at least 75% of R LE strength as measured via MicroFet handheld dynamometer in order to improve functional mobility - progressing  3. Pt will improve L knee AROM to at least 0-90 degrees in order to improve gait - progressing     Long Term Goals: (6 weeks)  1. Pt will be independent with updated HEP supplement PT in improving functional mobility. - progressing  2. Pt will improve L LE strength to at least 90% of R LE strength as measured via MicroFet handheld dynamometer in order to improve functional mobility - progressing  3. Pt will improve L knee AROM to at least 0-115 degrees in order to improve gait and ability to perform ADLs - progressing  4. Pt will improve FOTO knee survey score to </= 48% limited in order to demo improved functional mobility - progressing  5. Pt will improve score on Function,Daily Living section of KOOS to at least 34/68 - progressing   (50% limited) in order to demo improved functional mobility - progressing  6. Pt will perform TUG in < 10 seconds without AD in order to demo improved gait speed - progressing  7. Pt will perform at least 10 sit to stands without UE support on 30 second sit to stand test in order to demo improved ability to perform transfers - progressing    Plan   Dasa protocol TKA.  Phase I -> II.  Straight cane gait training trial.     Rufus Santa, PT

## 2019-08-22 ENCOUNTER — TELEPHONE (OUTPATIENT)
Dept: REHABILITATION | Facility: HOSPITAL | Age: 79
End: 2019-08-22

## 2019-08-22 DIAGNOSIS — M25.562 ACUTE PAIN OF LEFT KNEE: ICD-10-CM

## 2019-08-22 DIAGNOSIS — Z74.09 DECREASED MOBILITY AND ENDURANCE: ICD-10-CM

## 2019-08-22 DIAGNOSIS — R53.1 DECREASED STRENGTH: ICD-10-CM

## 2019-08-22 NOTE — TELEPHONE ENCOUNTER
PT spoke to daughter about missed appointment today.Tal stated that he had a urologist appointment yesterday because he has some blood in his urine. Today he is having some chills and flu-like symptoms so he is resting at home. PT encouraged pt to call tomorrow to try to get an appointment if pt is feeling much better or to rest more if he still is not feeling well. PT encouraged maintaining HEP compliance to pt tolerance as well. Daughter is aware of appointments next week.

## 2019-08-26 ENCOUNTER — CLINICAL SUPPORT (OUTPATIENT)
Dept: REHABILITATION | Facility: HOSPITAL | Age: 79
End: 2019-08-26
Payer: MEDICARE

## 2019-08-26 DIAGNOSIS — Z74.09 DECREASED MOBILITY AND ENDURANCE: ICD-10-CM

## 2019-08-26 DIAGNOSIS — R53.1 DECREASED STRENGTH: ICD-10-CM

## 2019-08-26 DIAGNOSIS — M25.562 ACUTE PAIN OF LEFT KNEE: ICD-10-CM

## 2019-08-26 PROCEDURE — 97110 THERAPEUTIC EXERCISES: CPT | Mod: PN

## 2019-08-26 NOTE — PROGRESS NOTES
Physical Therapy Daily Treatment Note     Name: Sly Villarreal  Clinic Number: [id number]    Therapy Diagnosis:   Encounter Diagnoses   Name Primary?    Acute pain of left knee     Decreased strength     Decreased mobility and endurance      Physician: Marcus Bro MD    Visit Date: 8/26/2019    Physician Orders: PT Eval and Treat  Medical Diagnosis from Referral: M17.12 (ICD-10-CM) - Primary osteoarthritis of left knee  Evaluation Date: 7/23/2019  Authorization Period Expiration: 12/31/2019  Plan of Care Expiration: 9/17/19  Visit # / Visits authorized: 12/50  FOTO: 2/10  Gcode: 2/10    Visit: 60.64     Total: 1957.73     Time In: 1006  Time Out: 1030  Total Billable Time: 24 minutes (TE-2)  Precautions: Standard, DM II, past R TKA    Subjective     Daughter present for translation    Pt reports: he has been ill.   He was compliant with home exercise program.  Response to previous treatment: no complaints  Functional change: no change  Pain: 8/10  Location: left knee      Objective     Sly received therapeutic exercises to develop strength, endurance, ROM, flexibility and posture for 24 minutes including:    -Bike     5' full revolutions  -HSS:      4x30'' L c/ strap - not performed today  -Knee flexion stretch   8x10'' L c/ double strap c/ PT overpressure NOT PERFORMED THIS TREATMENT  -SLR:      3x10 L - not performed today NOT PERFORMED THIS TREATMENT  -Step ups    2x10 L, Blue, 1 HR use  -Lateral step downs   3x10 L, L1, 1 HR use NOT PERFORMED THIS TREATMENT  -Heel raises    2x20 DL NOT PERFORMED THIS TREATMENT  -Calf fitter stretch   3x30'' DL - not performed today  -Steamboats    2x10 B  -Leg press    3x10 DL, 4 plates NOT PERFORMED THIS TREATMENT        Home Exercises Provided and Patient Education Provided   Education provided:   - contiue HEP  - do not submerse surgical wound in bath water.    - educate on steri-strips.    - work in standing and walking with good wieghtbearing using RW for  now    Written Home Exercises Provided: yes.  Exercises were reviewed and Sly was able to demonstrate them prior to the end of the session.  Sly demonstrated good  understanding of the education provided.     See EMR under Media for exercises provided 7/24/2019.    Assessment     Patient daughter requested blood pressure to be taken due to patient with low blood pressure the last several days due to being ill. At this this time she also told PT that her father was running a fever. BP was taken in sitting at rest and was 113/84. Therapy was ended and PT told patient daughter that if patient is running fever in the future to call and let us know and we will reschedule his appointment.      Sly is progressing well towards his goals.   Pt prognosis is Excellent.     Pt will continue to benefit from skilled outpatient physical therapy to address the deficits listed in the problem list box on initial evaluation, provide pt/family education and to maximize pt's level of independence in the home and community environment.   Pt's spiritual, cultural and educational needs considered and pt agreeable to plan of care and goals.     Anticipated barriers to physical therapy: adverse reactions to pain medications and anesthesia    Goals:  Short Term Goals: (3 weeks)  1. Pt will be independent with HEP supplement PT in improving functional mobility. - progressing  2. Pt will improve LLE strength to at least 75% of R LE strength as measured via MicroFet handheld dynamometer in order to improve functional mobility - progressing  3. Pt will improve L knee AROM to at least 0-90 degrees in order to improve gait - progressing     Long Term Goals: (6 weeks)  1. Pt will be independent with updated HEP supplement PT in improving functional mobility. - progressing  2. Pt will improve L LE strength to at least 90% of R LE strength as measured via MicroFet handheld dynamometer in order to improve functional mobility -  progressing  3. Pt will improve L knee AROM to at least 0-115 degrees in order to improve gait and ability to perform ADLs - progressing  4. Pt will improve FOTO knee survey score to </= 48% limited in order to demo improved functional mobility - progressing  5. Pt will improve score on Function,Daily Living section of KOOS to at least 34/68 - progressing   (50% limited) in order to demo improved functional mobility - progressing  6. Pt will perform TUG in < 10 seconds without AD in order to demo improved gait speed - progressing  7. Pt will perform at least 10 sit to stands without UE support on 30 second sit to stand test in order to demo improved ability to perform transfers - progressing    Plan     Take measurements next.    Rachelle Hirsch, PT

## 2021-05-04 ENCOUNTER — PATIENT MESSAGE (OUTPATIENT)
Dept: RESEARCH | Facility: HOSPITAL | Age: 81
End: 2021-05-04

## 2022-12-13 ENCOUNTER — LAB VISIT (OUTPATIENT)
Dept: LAB | Facility: HOSPITAL | Age: 82
End: 2022-12-13
Attending: INTERNAL MEDICINE
Payer: MEDICARE

## 2022-12-13 DIAGNOSIS — E53.8 BIOTIN-(PROPIONYL-COA-CARBOXYLASE) LIGASE DEFICIENCY: ICD-10-CM

## 2022-12-13 DIAGNOSIS — R73.9 BLOOD GLUCOSE ELEVATED: ICD-10-CM

## 2022-12-13 DIAGNOSIS — Z11.3 SCREENING EXAMINATION FOR VENEREAL DISEASE: ICD-10-CM

## 2022-12-13 DIAGNOSIS — N40.0 BENIGN ENLARGEMENT OF PROSTATE: ICD-10-CM

## 2022-12-13 DIAGNOSIS — E78.2 MIXED HYPERLIPIDEMIA: Primary | ICD-10-CM

## 2022-12-13 LAB
ALBUMIN SERPL BCP-MCNC: 3.9 G/DL (ref 3.5–5.2)
ALP SERPL-CCNC: 71 U/L (ref 55–135)
ALT SERPL W/O P-5'-P-CCNC: 14 U/L (ref 10–44)
ANION GAP SERPL CALC-SCNC: 13 MMOL/L (ref 8–16)
AST SERPL-CCNC: 26 U/L (ref 10–40)
BASOPHILS # BLD AUTO: 0.1 K/UL (ref 0–0.2)
BASOPHILS NFR BLD: 1.4 % (ref 0–1.9)
BILIRUB SERPL-MCNC: 0.4 MG/DL (ref 0.1–1)
BNP SERPL-MCNC: 24 PG/ML (ref 0–99)
BUN SERPL-MCNC: 18 MG/DL (ref 8–23)
CALCIUM SERPL-MCNC: 9.1 MG/DL (ref 8.7–10.5)
CHLORIDE SERPL-SCNC: 105 MMOL/L (ref 95–110)
CO2 SERPL-SCNC: 21 MMOL/L (ref 23–29)
CREAT SERPL-MCNC: 1.1 MG/DL (ref 0.5–1.4)
DIFFERENTIAL METHOD: ABNORMAL
EOSINOPHIL # BLD AUTO: 0.2 K/UL (ref 0–0.5)
EOSINOPHIL NFR BLD: 2 % (ref 0–8)
ERYTHROCYTE [DISTWIDTH] IN BLOOD BY AUTOMATED COUNT: 14.7 % (ref 11.5–14.5)
EST. GFR  (NO RACE VARIABLE): >60 ML/MIN/1.73 M^2
ESTIMATED AVG GLUCOSE: 120 MG/DL (ref 68–131)
GLUCOSE SERPL-MCNC: 85 MG/DL (ref 70–110)
HBA1C MFR BLD: 5.8 % (ref 4–5.6)
HCT VFR BLD AUTO: 33.2 % (ref 40–54)
HGB BLD-MCNC: 9.8 G/DL (ref 14–18)
IMM GRANULOCYTES # BLD AUTO: 0.02 K/UL (ref 0–0.04)
IMM GRANULOCYTES NFR BLD AUTO: 0.3 % (ref 0–0.5)
LYMPHOCYTES # BLD AUTO: 1.8 K/UL (ref 1–4.8)
LYMPHOCYTES NFR BLD: 24.2 % (ref 18–48)
MCH RBC QN AUTO: 21.7 PG (ref 27–31)
MCHC RBC AUTO-ENTMCNC: 29.5 G/DL (ref 32–36)
MCV RBC AUTO: 74 FL (ref 82–98)
MONOCYTES # BLD AUTO: 0.6 K/UL (ref 0.3–1)
MONOCYTES NFR BLD: 7.8 % (ref 4–15)
NEUTROPHILS # BLD AUTO: 4.7 K/UL (ref 1.8–7.7)
NEUTROPHILS NFR BLD: 64.3 % (ref 38–73)
NRBC BLD-RTO: 0 /100 WBC
PLATELET # BLD AUTO: 353 K/UL (ref 150–450)
PMV BLD AUTO: 9.8 FL (ref 9.2–12.9)
POTASSIUM SERPL-SCNC: 4.4 MMOL/L (ref 3.5–5.1)
PROSTATE SPECIFIC ANTIGEN, TOTAL: 4.2 NG/ML (ref 0–4)
PROT SERPL-MCNC: 8.4 G/DL (ref 6–8.4)
PSA FREE MFR SERPL: 30.95 %
PSA FREE SERPL-MCNC: 1.3 NG/ML (ref 0–1.5)
RBC # BLD AUTO: 4.51 M/UL (ref 4.6–6.2)
SODIUM SERPL-SCNC: 139 MMOL/L (ref 136–145)
T4 FREE SERPL-MCNC: 0.94 NG/DL (ref 0.71–1.51)
TSH SERPL DL<=0.005 MIU/L-ACNC: 6.07 UIU/ML (ref 0.4–4)
URATE SERPL-MCNC: 6 MG/DL (ref 3.4–7)
WBC # BLD AUTO: 7.35 K/UL (ref 3.9–12.7)

## 2022-12-13 PROCEDURE — 84439 ASSAY OF FREE THYROXINE: CPT | Performed by: INTERNAL MEDICINE

## 2022-12-13 PROCEDURE — 86592 SYPHILIS TEST NON-TREP QUAL: CPT | Performed by: INTERNAL MEDICINE

## 2022-12-13 PROCEDURE — 84550 ASSAY OF BLOOD/URIC ACID: CPT | Performed by: INTERNAL MEDICINE

## 2022-12-13 PROCEDURE — 84443 ASSAY THYROID STIM HORMONE: CPT | Performed by: INTERNAL MEDICINE

## 2022-12-13 PROCEDURE — 83036 HEMOGLOBIN GLYCOSYLATED A1C: CPT | Performed by: INTERNAL MEDICINE

## 2022-12-13 PROCEDURE — 82607 VITAMIN B-12: CPT | Performed by: INTERNAL MEDICINE

## 2022-12-13 PROCEDURE — 83880 ASSAY OF NATRIURETIC PEPTIDE: CPT | Performed by: INTERNAL MEDICINE

## 2022-12-13 PROCEDURE — 84153 ASSAY OF PSA TOTAL: CPT | Performed by: INTERNAL MEDICINE

## 2022-12-13 PROCEDURE — 80053 COMPREHEN METABOLIC PANEL: CPT | Performed by: INTERNAL MEDICINE

## 2022-12-13 PROCEDURE — 36415 COLL VENOUS BLD VENIPUNCTURE: CPT | Performed by: INTERNAL MEDICINE

## 2022-12-13 PROCEDURE — 85025 COMPLETE CBC W/AUTO DIFF WBC: CPT | Performed by: INTERNAL MEDICINE

## 2022-12-14 LAB
RPR SER QL: NORMAL
VIT B12 SERPL-MCNC: 148 PG/ML (ref 210–950)

## 2023-05-04 ENCOUNTER — HOSPITAL ENCOUNTER (OUTPATIENT)
Facility: HOSPITAL | Age: 83
Discharge: HOME | End: 2023-05-06
Attending: STUDENT IN AN ORGANIZED HEALTH CARE EDUCATION/TRAINING PROGRAM | Admitting: FAMILY MEDICINE
Payer: MEDICARE

## 2023-05-04 DIAGNOSIS — R10.11 RUQ PAIN: ICD-10-CM

## 2023-05-04 DIAGNOSIS — K85.00 IDIOPATHIC ACUTE PANCREATITIS WITHOUT INFECTION OR NECROSIS: Primary | ICD-10-CM

## 2023-05-04 DIAGNOSIS — I50.32 CHRONIC HEART FAILURE WITH PRESERVED EJECTION FRACTION: ICD-10-CM

## 2023-05-04 DIAGNOSIS — E11.9 TYPE 2 DIABETES MELLITUS WITHOUT COMPLICATION, WITHOUT LONG-TERM CURRENT USE OF INSULIN: ICD-10-CM

## 2023-05-04 DIAGNOSIS — R10.13 EPIGASTRIC PAIN: ICD-10-CM

## 2023-05-04 DIAGNOSIS — K85.90 ACUTE PANCREATITIS: ICD-10-CM

## 2023-05-04 DIAGNOSIS — N40.0 BENIGN PROSTATIC HYPERPLASIA, UNSPECIFIED WHETHER LOWER URINARY TRACT SYMPTOMS PRESENT: ICD-10-CM

## 2023-05-04 DIAGNOSIS — E03.9 HYPOTHYROIDISM, UNSPECIFIED TYPE: ICD-10-CM

## 2023-05-04 PROBLEM — K85.10 ACUTE BILIARY PANCREATITIS WITHOUT INFECTION OR NECROSIS: Status: ACTIVE | Noted: 2023-05-04

## 2023-05-04 PROBLEM — I15.9 SECONDARY HYPERTENSION: Status: ACTIVE | Noted: 2023-05-04

## 2023-05-04 LAB
ALBUMIN SERPL BCP-MCNC: 3.9 G/DL (ref 3.5–5.2)
ALP SERPL-CCNC: 102 U/L (ref 55–135)
ALT SERPL W/O P-5'-P-CCNC: 30 U/L (ref 10–44)
ANION GAP SERPL CALC-SCNC: 12 MMOL/L (ref 8–16)
AST SERPL-CCNC: 64 U/L (ref 10–40)
BASOPHILS # BLD AUTO: 0.03 K/UL (ref 0–0.2)
BASOPHILS NFR BLD: 0.4 % (ref 0–1.9)
BILIRUB SERPL-MCNC: 1.8 MG/DL (ref 0.1–1)
BILIRUB UR QL STRIP: NEGATIVE
BUN SERPL-MCNC: 22 MG/DL (ref 8–23)
CALCIUM SERPL-MCNC: 8.8 MG/DL (ref 8.7–10.5)
CHLORIDE SERPL-SCNC: 106 MMOL/L (ref 95–110)
CHOLEST SERPL-MCNC: 200 MG/DL (ref 120–199)
CHOLEST/HDLC SERPL: 4.9 {RATIO} (ref 2–5)
CLARITY UR: CLEAR
CO2 SERPL-SCNC: 22 MMOL/L (ref 23–29)
COLOR UR: YELLOW
CREAT SERPL-MCNC: 1.4 MG/DL (ref 0.5–1.4)
DIFFERENTIAL METHOD: ABNORMAL
EOSINOPHIL # BLD AUTO: 0 K/UL (ref 0–0.5)
EOSINOPHIL NFR BLD: 0.1 % (ref 0–8)
ERYTHROCYTE [DISTWIDTH] IN BLOOD BY AUTOMATED COUNT: 14.5 % (ref 11.5–14.5)
EST. GFR  (NO RACE VARIABLE): 49 ML/MIN/1.73 M^2
ESTIMATED AVG GLUCOSE: 123 MG/DL (ref 68–131)
GLUCOSE SERPL-MCNC: 102 MG/DL (ref 70–110)
GLUCOSE UR QL STRIP: NEGATIVE
HBA1C MFR BLD: 5.9 % (ref 4–5.6)
HCT VFR BLD AUTO: 41.4 % (ref 40–54)
HDLC SERPL-MCNC: 41 MG/DL (ref 40–75)
HDLC SERPL: 20.5 % (ref 20–50)
HGB BLD-MCNC: 12.9 G/DL (ref 14–18)
HGB UR QL STRIP: NEGATIVE
IMM GRANULOCYTES # BLD AUTO: 0.04 K/UL (ref 0–0.04)
IMM GRANULOCYTES NFR BLD AUTO: 0.5 % (ref 0–0.5)
KETONES UR QL STRIP: NEGATIVE
LACTATE SERPL-SCNC: 2.2 MMOL/L (ref 0.5–2.2)
LDLC SERPL CALC-MCNC: 133.6 MG/DL (ref 63–159)
LEUKOCYTE ESTERASE UR QL STRIP: NEGATIVE
LIPASE SERPL-CCNC: >1000 U/L (ref 4–60)
LYMPHOCYTES # BLD AUTO: 0.3 K/UL (ref 1–4.8)
LYMPHOCYTES NFR BLD: 3.4 % (ref 18–48)
MCH RBC QN AUTO: 25.3 PG (ref 27–31)
MCHC RBC AUTO-ENTMCNC: 31.2 G/DL (ref 32–36)
MCV RBC AUTO: 81 FL (ref 82–98)
MONOCYTES # BLD AUTO: 0 K/UL (ref 0.3–1)
MONOCYTES NFR BLD: 0.5 % (ref 4–15)
NEUTROPHILS # BLD AUTO: 7.4 K/UL (ref 1.8–7.7)
NEUTROPHILS NFR BLD: 95.1 % (ref 38–73)
NITRITE UR QL STRIP: NEGATIVE
NONHDLC SERPL-MCNC: 159 MG/DL
NRBC BLD-RTO: 0 /100 WBC
PH UR STRIP: 7 [PH] (ref 5–8)
PLATELET # BLD AUTO: 237 K/UL (ref 150–450)
PMV BLD AUTO: 9.1 FL (ref 9.2–12.9)
POCT GLUCOSE: 101 MG/DL (ref 70–110)
POCT GLUCOSE: 80 MG/DL (ref 70–110)
POCT GLUCOSE: 91 MG/DL (ref 70–110)
POTASSIUM SERPL-SCNC: 3.6 MMOL/L (ref 3.5–5.1)
PROT SERPL-MCNC: 7.4 G/DL (ref 6–8.4)
PROT UR QL STRIP: NEGATIVE
RBC # BLD AUTO: 5.09 M/UL (ref 4.6–6.2)
SODIUM SERPL-SCNC: 140 MMOL/L (ref 136–145)
SP GR UR STRIP: >1.03 (ref 1–1.03)
T4 FREE SERPL-MCNC: 1.03 NG/DL (ref 0.71–1.51)
TRIGL SERPL-MCNC: 127 MG/DL (ref 30–150)
TSH SERPL DL<=0.005 MIU/L-ACNC: 18.29 UIU/ML (ref 0.4–4)
URN SPEC COLLECT METH UR: ABNORMAL
UROBILINOGEN UR STRIP-ACNC: NEGATIVE EU/DL
WBC # BLD AUTO: 7.74 K/UL (ref 3.9–12.7)

## 2023-05-04 PROCEDURE — 96361 HYDRATE IV INFUSION ADD-ON: CPT

## 2023-05-04 PROCEDURE — 84439 ASSAY OF FREE THYROXINE: CPT | Performed by: STUDENT IN AN ORGANIZED HEALTH CARE EDUCATION/TRAINING PROGRAM

## 2023-05-04 PROCEDURE — 80053 COMPREHEN METABOLIC PANEL: CPT | Performed by: STUDENT IN AN ORGANIZED HEALTH CARE EDUCATION/TRAINING PROGRAM

## 2023-05-04 PROCEDURE — 63600175 PHARM REV CODE 636 W HCPCS: Performed by: STUDENT IN AN ORGANIZED HEALTH CARE EDUCATION/TRAINING PROGRAM

## 2023-05-04 PROCEDURE — G0378 HOSPITAL OBSERVATION PER HR: HCPCS

## 2023-05-04 PROCEDURE — 25000003 PHARM REV CODE 250: Performed by: STUDENT IN AN ORGANIZED HEALTH CARE EDUCATION/TRAINING PROGRAM

## 2023-05-04 PROCEDURE — 96375 TX/PRO/DX INJ NEW DRUG ADDON: CPT

## 2023-05-04 PROCEDURE — 96376 TX/PRO/DX INJ SAME DRUG ADON: CPT

## 2023-05-04 PROCEDURE — 84443 ASSAY THYROID STIM HORMONE: CPT | Performed by: STUDENT IN AN ORGANIZED HEALTH CARE EDUCATION/TRAINING PROGRAM

## 2023-05-04 PROCEDURE — 83036 HEMOGLOBIN GLYCOSYLATED A1C: CPT | Performed by: STUDENT IN AN ORGANIZED HEALTH CARE EDUCATION/TRAINING PROGRAM

## 2023-05-04 PROCEDURE — 93005 ELECTROCARDIOGRAM TRACING: CPT

## 2023-05-04 PROCEDURE — 83690 ASSAY OF LIPASE: CPT | Performed by: STUDENT IN AN ORGANIZED HEALTH CARE EDUCATION/TRAINING PROGRAM

## 2023-05-04 PROCEDURE — 99285 EMERGENCY DEPT VISIT HI MDM: CPT | Mod: 25

## 2023-05-04 PROCEDURE — 63600175 PHARM REV CODE 636 W HCPCS: Performed by: EMERGENCY MEDICINE

## 2023-05-04 PROCEDURE — 80061 LIPID PANEL: CPT | Performed by: STUDENT IN AN ORGANIZED HEALTH CARE EDUCATION/TRAINING PROGRAM

## 2023-05-04 PROCEDURE — 85025 COMPLETE CBC W/AUTO DIFF WBC: CPT | Performed by: STUDENT IN AN ORGANIZED HEALTH CARE EDUCATION/TRAINING PROGRAM

## 2023-05-04 PROCEDURE — 93010 ELECTROCARDIOGRAM REPORT: CPT | Mod: ,,, | Performed by: INTERNAL MEDICINE

## 2023-05-04 PROCEDURE — 93010 EKG 12-LEAD: ICD-10-PCS | Mod: ,,, | Performed by: INTERNAL MEDICINE

## 2023-05-04 PROCEDURE — 83605 ASSAY OF LACTIC ACID: CPT | Performed by: STUDENT IN AN ORGANIZED HEALTH CARE EDUCATION/TRAINING PROGRAM

## 2023-05-04 PROCEDURE — 25500020 PHARM REV CODE 255: Performed by: STUDENT IN AN ORGANIZED HEALTH CARE EDUCATION/TRAINING PROGRAM

## 2023-05-04 PROCEDURE — 81003 URINALYSIS AUTO W/O SCOPE: CPT | Performed by: STUDENT IN AN ORGANIZED HEALTH CARE EDUCATION/TRAINING PROGRAM

## 2023-05-04 PROCEDURE — 96374 THER/PROPH/DIAG INJ IV PUSH: CPT | Mod: 59

## 2023-05-04 RX ORDER — LEVOTHYROXINE SODIUM 25 UG/1
25 TABLET ORAL
Status: DISCONTINUED | OUTPATIENT
Start: 2023-05-05 | End: 2023-05-06 | Stop reason: HOSPADM

## 2023-05-04 RX ORDER — DEXTROSE 40 %
30 GEL (GRAM) ORAL
Status: DISCONTINUED | OUTPATIENT
Start: 2023-05-04 | End: 2023-05-06 | Stop reason: HOSPADM

## 2023-05-04 RX ORDER — MORPHINE SULFATE 4 MG/ML
4 INJECTION, SOLUTION INTRAMUSCULAR; INTRAVENOUS
Status: COMPLETED | OUTPATIENT
Start: 2023-05-04 | End: 2023-05-04

## 2023-05-04 RX ORDER — SODIUM CHLORIDE 0.9 % (FLUSH) 0.9 %
5 SYRINGE (ML) INJECTION
Status: DISCONTINUED | OUTPATIENT
Start: 2023-05-04 | End: 2023-05-06 | Stop reason: HOSPADM

## 2023-05-04 RX ORDER — INSULIN ASPART 100 [IU]/ML
1-10 INJECTION, SOLUTION INTRAVENOUS; SUBCUTANEOUS
Status: DISCONTINUED | OUTPATIENT
Start: 2023-05-04 | End: 2023-05-06 | Stop reason: HOSPADM

## 2023-05-04 RX ORDER — AMOXICILLIN 250 MG
1 CAPSULE ORAL 2 TIMES DAILY PRN
Status: DISCONTINUED | OUTPATIENT
Start: 2023-05-04 | End: 2023-05-06 | Stop reason: HOSPADM

## 2023-05-04 RX ORDER — GLUCAGON 1 MG
1 KIT INJECTION
Status: DISCONTINUED | OUTPATIENT
Start: 2023-05-04 | End: 2023-05-06 | Stop reason: HOSPADM

## 2023-05-04 RX ORDER — PROCHLORPERAZINE EDISYLATE 5 MG/ML
5 INJECTION INTRAMUSCULAR; INTRAVENOUS EVERY 6 HOURS PRN
Status: DISCONTINUED | OUTPATIENT
Start: 2023-05-04 | End: 2023-05-06 | Stop reason: HOSPADM

## 2023-05-04 RX ORDER — ONDANSETRON 2 MG/ML
4 INJECTION INTRAMUSCULAR; INTRAVENOUS
Status: COMPLETED | OUTPATIENT
Start: 2023-05-04 | End: 2023-05-04

## 2023-05-04 RX ORDER — NALOXONE HCL 0.4 MG/ML
0.02 VIAL (ML) INJECTION
Status: DISCONTINUED | OUTPATIENT
Start: 2023-05-04 | End: 2023-05-06 | Stop reason: HOSPADM

## 2023-05-04 RX ORDER — TALC
9 POWDER (GRAM) TOPICAL NIGHTLY PRN
Status: DISCONTINUED | OUTPATIENT
Start: 2023-05-04 | End: 2023-05-06 | Stop reason: HOSPADM

## 2023-05-04 RX ORDER — HYDROMORPHONE HYDROCHLORIDE 1 MG/ML
0.5 INJECTION, SOLUTION INTRAMUSCULAR; INTRAVENOUS; SUBCUTANEOUS EVERY 4 HOURS PRN
Status: DISCONTINUED | OUTPATIENT
Start: 2023-05-04 | End: 2023-05-06 | Stop reason: HOSPADM

## 2023-05-04 RX ORDER — ACETAMINOPHEN 325 MG/1
650 TABLET ORAL EVERY 6 HOURS PRN
Status: DISCONTINUED | OUTPATIENT
Start: 2023-05-04 | End: 2023-05-06 | Stop reason: HOSPADM

## 2023-05-04 RX ORDER — DEXTROSE 40 %
15 GEL (GRAM) ORAL
Status: DISCONTINUED | OUTPATIENT
Start: 2023-05-04 | End: 2023-05-06 | Stop reason: HOSPADM

## 2023-05-04 RX ORDER — SODIUM CHLORIDE, SODIUM LACTATE, POTASSIUM CHLORIDE, CALCIUM CHLORIDE 600; 310; 30; 20 MG/100ML; MG/100ML; MG/100ML; MG/100ML
INJECTION, SOLUTION INTRAVENOUS CONTINUOUS
Status: DISCONTINUED | OUTPATIENT
Start: 2023-05-04 | End: 2023-05-05

## 2023-05-04 RX ORDER — ONDANSETRON 2 MG/ML
8 INJECTION INTRAMUSCULAR; INTRAVENOUS EVERY 6 HOURS PRN
Status: DISCONTINUED | OUTPATIENT
Start: 2023-05-04 | End: 2023-05-06 | Stop reason: HOSPADM

## 2023-05-04 RX ORDER — CARVEDILOL 6.25 MG/1
6.25 TABLET ORAL 2 TIMES DAILY WITH MEALS
Status: DISCONTINUED | OUTPATIENT
Start: 2023-05-04 | End: 2023-05-06 | Stop reason: HOSPADM

## 2023-05-04 RX ORDER — FENTANYL CITRATE 50 UG/ML
50 INJECTION, SOLUTION INTRAMUSCULAR; INTRAVENOUS
Status: COMPLETED | OUTPATIENT
Start: 2023-05-04 | End: 2023-05-04

## 2023-05-04 RX ORDER — ENOXAPARIN SODIUM 100 MG/ML
40 INJECTION SUBCUTANEOUS EVERY 24 HOURS
Status: DISCONTINUED | OUTPATIENT
Start: 2023-05-04 | End: 2023-05-04

## 2023-05-04 RX ORDER — GABAPENTIN 100 MG/1
100 CAPSULE ORAL 3 TIMES DAILY
Status: DISCONTINUED | OUTPATIENT
Start: 2023-05-04 | End: 2023-05-06 | Stop reason: HOSPADM

## 2023-05-04 RX ADMIN — CARVEDILOL 6.25 MG: 6.25 TABLET, FILM COATED ORAL at 05:05

## 2023-05-04 RX ADMIN — FENTANYL CITRATE 50 MCG: 50 INJECTION, SOLUTION INTRAMUSCULAR; INTRAVENOUS at 07:05

## 2023-05-04 RX ADMIN — SODIUM CHLORIDE, POTASSIUM CHLORIDE, SODIUM LACTATE AND CALCIUM CHLORIDE: 600; 310; 30; 20 INJECTION, SOLUTION INTRAVENOUS at 08:05

## 2023-05-04 RX ADMIN — SODIUM CHLORIDE, POTASSIUM CHLORIDE, SODIUM LACTATE AND CALCIUM CHLORIDE: 600; 310; 30; 20 INJECTION, SOLUTION INTRAVENOUS at 11:05

## 2023-05-04 RX ADMIN — ONDANSETRON HYDROCHLORIDE 4 MG: 2 SOLUTION INTRAMUSCULAR; INTRAVENOUS at 05:05

## 2023-05-04 RX ADMIN — IOHEXOL 100 ML: 350 INJECTION, SOLUTION INTRAVENOUS at 06:05

## 2023-05-04 RX ADMIN — SODIUM CHLORIDE 1000 ML: 0.9 INJECTION, SOLUTION INTRAVENOUS at 06:05

## 2023-05-04 RX ADMIN — HYDROMORPHONE HYDROCHLORIDE 0.5 MG: 1 INJECTION, SOLUTION INTRAMUSCULAR; INTRAVENOUS; SUBCUTANEOUS at 09:05

## 2023-05-04 RX ADMIN — HYDROMORPHONE HYDROCHLORIDE 0.5 MG: 1 INJECTION, SOLUTION INTRAMUSCULAR; INTRAVENOUS; SUBCUTANEOUS at 11:05

## 2023-05-04 RX ADMIN — GABAPENTIN 100 MG: 100 CAPSULE ORAL at 09:05

## 2023-05-04 RX ADMIN — GABAPENTIN 100 MG: 100 CAPSULE ORAL at 05:05

## 2023-05-04 RX ADMIN — MORPHINE SULFATE 4 MG: 4 INJECTION INTRAVENOUS at 05:05

## 2023-05-04 RX ADMIN — SODIUM CHLORIDE, POTASSIUM CHLORIDE, SODIUM LACTATE AND CALCIUM CHLORIDE: 600; 310; 30; 20 INJECTION, SOLUTION INTRAVENOUS at 09:05

## 2023-05-04 NOTE — HPI
Sly Villarreal is a 83 y.o. male with PMH HTN, T2DM, HFpEF (EF 60%, grade I diastolic on last echo), hypothyroidism, BPH who presented for epigastric abdominal pain. Grandson at bedside assisted with history and translation per patient preference. Pt fasted day prior to admission and last ate at 8PM night before. Around 1AM on day of admission started to develop epigastric abdominal pain that progressively worsened. Also had multiple episodes of emesis. No fevers or chills. Pain does not radiate to the back. No hematemesis. Denies EtOH use. No history of pancreatitis. Prior to morning of admission had been feeling well. Pt previously on multiple medications, currently only takes gabapentin and coreg, however does not take these daily. Per grandson at bedside pt lives with him and his family. Surrogate decision maker would be his daughter, Kimberly.    In the ED VSS. CBC unremarkable. CMP with bili of 1.8 and lipase >1000. CT abd/pelvi with findings consistent with pancreatitis and mild dilation of CBD thought to be reactive inflammation. No signs of cholecystitis or choledocholithiasis. [facility] then consulted for admission for pancreatitis.

## 2023-05-04 NOTE — ED NOTES
Patient identifiers for Sly Villarreal checked and correct.  LOC: The patient is awake, alert and aware of environment.  APPEARANCE: Patient uncomfortable and in no acute distress.  SKIN: The skin is warm and dry.  MUSKULOSKELETAL: Patient moving all extremities well, no obvious swelling or deformities noted.  RESPIRATORY: Airway is open and patent, respirations are spontaneous, patient has a normal effort and rate.  CARDIAC: Patient has a normal rate and rhythm, no periphreal noted.  ABDOMEN: Tender to palpation.

## 2023-05-04 NOTE — ASSESSMENT & PLAN NOTE
Lipase >1000 on admission  CT scan with pancreatitis and mild CBD dilation to 13mm thought to be reactive. No signs of cholelithiasis or choledocolithiasis.    Plan:  - LR 150cc/hr IVF  - Clear liquid diet, ADAT  - Nausea control with zofran and promethazine  - Pain control with tylenol and dilaudid PRN  - RUQ US pending. Plan to consult general surgery for possible cholecystectomy pending results  - Plan to obtain lipid panel to evaluate triglyceride level

## 2023-05-04 NOTE — ASSESSMENT & PLAN NOTE
No longer taking lasix or bumex. Not fluid overloaded on exam.   Will monitor fluid status closely while getting IVF  Continue home coreg

## 2023-05-04 NOTE — PROGRESS NOTES
Pt arrived to unit. Introduced self as VN for this shift. Admission questions completed by VN with assist of  Luciana #[id number] and patient grandsoledad Heart. Educated pt on safety precautions, and VN's role in pt care. Opportunity given for pt's questions. All questions answered.    05/04/23 0936   Nurse Notification   Bedside Nurse Notified? Yes   Name of Bedside Nurse Garrett Wright LPN   Nurse Notfication Method Secure Chat   Nurse Notified Of Medications;Patient Request  (pain 9/10 and nausea)   Admission   Initial VN Admission Questions Complete   Communication Issues? Patient Hearing   Shift   Virtual Nurse - Patient Verbalized Approval Of Camera Use   Type of Frequent Check   Type Other (see comments)  (Admission)   Safety/Activity   Patient Rounds bed in low position;placement of personal items at bedside;visualized patient;call light in patient/parent reach   Safety Promotion/Fall Prevention assistive device/personal item within reach;Fall Risk reviewed with patient/family;side rails raised x 2;instructed to call staff for mobility   Positioning   Body Position position changed independently   Head of Bed (HOB) Positioning HOB at 30-45 degrees   Pain/Comfort/Sleep   Preferred Pain Scale number (Numeric Rating Pain Scale)   Pain Body Location abdomen   Pain Rating (0-10): Rest 9

## 2023-05-04 NOTE — ASSESSMENT & PLAN NOTE
Previously on metformin and repaglinide, no longer taking  Will obtain Hgb A1C and start MDSSI while inpatient  Continue home gabapentin 100mg TID

## 2023-05-04 NOTE — H&P
[facility] Medicine  History & Physical    Patient Name: Sly Villarreal  MRN: [id number]  Patient Class: OP- Observation  Admission Date: 5/4/2023  Attending Physician: Moriah Vargas MD   Primary Care Provider: Joe Bunn MD         Patient information was obtained from patient, relative(s) and ER records.     Subjective:     Principal Problem:Acute biliary pancreatitis without infection or necrosis    Chief Complaint:   Chief Complaint   Patient presents with    Abdominal Pain     Patient brought in by grandsons. Started with epigastric pain around 1-2 am. Reports N/V x 6 episodes. Reports cough and chills. Last BM was tonight and normal.         HPI: Sly Villarreal is a 83 y.o. male with PMH HTN, T2DM, HFpEF (EF 60%, grade I diastolic on last echo), hypothyroidism, BPH who presented for epigastric abdominal pain. Grandson at bedside assisted with history and translation per patient preference. Pt fasted day prior to admission and last ate at 8PM night before. Around 1AM on day of admission started to develop epigastric abdominal pain that progressively worsened. Also had multiple episodes of emesis. No fevers or chills. Pain does not radiate to the back. No hematemesis. Denies EtOH use. No history of pancreatitis. Prior to morning of admission had been feeling well. Pt previously on multiple medications, currently only takes gabapentin and coreg, however does not take these daily. Per grandson at bedside pt lives with him and his family. Surrogate decision maker would be his daughter, Kimberly.    In the ED VSS. CBC unremarkable. CMP with bili of 1.8 and lipase >1000. CT abd/pelvi with findings consistent with pancreatitis and mild dilation of CBD thought to be reactive inflammation. No signs of cholecystitis or choledocholithiasis. [facility] then consulted for admission for pancreatitis.       Past Medical History:   Diagnosis Date    Hypothyroid 2019    Type 2 diabetes mellitus 2019        Past Surgical History:   Procedure Laterality Date    JOINT REPLACEMENT Right     knee    PERCUTANEOUS CRYOTHERAPY OF PERIPHERAL NERVE USING LIQUID NITROUS OXIDE IN CLOSED NEEDLE DEVICE Right 1/24/2019    Procedure: CRYOTHERAPY, NERVE, PERIPHERAL, PERCUTANEOUS, USING LIQUID NITROUS OXIDE IN CLOSED NEEDLE DEVICE;  Surgeon: MONTANA Sutherland;  Location: [facility];  Service: Orthopedics;  Laterality: Right;       Review of patient's allergies indicates:   Allergen Reactions    Augmentin [amoxicillin-pot clavulanate] Anaphylaxis       Current Facility-Administered Medications on File Prior to Encounter   Medication    tranexamic acid (LYSTEDA) tablet 1,950 mg     Current Outpatient Medications on File Prior to Encounter   Medication Sig    gabapentin (NEURONTIN) 100 MG capsule Take 1 capsule by mouth every 8 hours.    aspirin (ECOTRIN) 81 MG EC tablet Take 1 tablet (81 mg total) by mouth 2 (two) times daily.    carvediloL (COREG) 6.25 MG tablet Take 1 tablet by mouth every 12 hours.    clobetasoL (TEMOVATE) 0.05 % cream Apply topically to affected area daily.    famotidine (PEPCID) 20 MG tablet Take 1 tablet (20 mg total) by mouth 2 (two) times daily.    timolol maleate 0.5% (TIMOPTIC) 0.5 % Drop INSTILL 1 DROP INTO EACH EYE TWICE DAILY    [DISCONTINUED] bumetanide (BUMEX) 1 MG tablet Take 1 tablet (1 mg total) by mouth every morning.    [DISCONTINUED] cephALEXin (KEFLEX) 500 MG capsule Take 500 mg by mouth every 6 (six) hours.    [DISCONTINUED] cholecalciferol, vitamin D3, 50,000 unit capsule Take 1 capsule by mouth once weekly.    [DISCONTINUED] ciprofloxacin HCl (CIPRO) 500 MG tablet TAKE 1 TABLET BY MOUTH EVERY 12 HOURS    [DISCONTINUED] doxycycline (VIBRA-TABS) 100 MG tablet Take 1 tablet by mouth every 12 hours.    [DISCONTINUED] ergocalciferol (ERGOCALCIFEROL) 50,000 unit Cap Take 1 capsule (50,000 Units total) by mouth every 7 days.    [DISCONTINUED] furosemide (LASIX) 20 MG tablet Take 1 tablet by mouth  everyday.    [DISCONTINUED] levothyroxine (SYNTHROID) 25 MCG tablet Take 25 mcg by mouth once daily.    [DISCONTINUED] meloxicam (MOBIC) 15 MG tablet TAKE 1 TABLET BY MOUTH DAILY WITH FOOD  FOR PAIN    [DISCONTINUED] metFORMIN (GLUCOPHAGE) 500 MG tablet Take 500 mg by mouth 2 (two) times daily with meals.    [DISCONTINUED] repaglinide (PRANDIN) 1 MG tablet TAKE 1 TABLET BY MOUTH DAILY    [DISCONTINUED] tamsulosin (FLOMAX) 0.4 mg Cap Take 1 capsule (0.4 mg total) by mouth once daily.     Family History    None       Tobacco Use    Smoking status: Never    Smokeless tobacco: Never   Substance and Sexual Activity    Alcohol use: No    Drug use: Never    Sexual activity: Not on file     Review of Systems   Constitutional:  Positive for appetite change. Negative for fatigue and fever.   HENT:  Negative for congestion, ear pain and sore throat.    Eyes:  Negative for visual disturbance.   Respiratory:  Negative for chest tightness, shortness of breath and wheezing.    Cardiovascular:  Negative for chest pain, palpitations and leg swelling.   Gastrointestinal:  Positive for abdominal pain, nausea and vomiting. Negative for constipation and diarrhea.   Endocrine: Negative for polydipsia and polyuria.   Genitourinary:  Negative for decreased urine volume and difficulty urinating.   Musculoskeletal:  Negative for back pain and gait problem.   Skin:  Negative for rash.   Allergic/Immunologic: Negative for environmental allergies.   Neurological:  Negative for dizziness, light-headedness and headaches.   Psychiatric/Behavioral:  Negative for behavioral problems and confusion.    Objective:     Vital Signs (Most Recent):  Temp: 97.7 °F (36.5 °C) (05/04/23 0439)  Pulse: 81 (05/04/23 0802)  Resp: (!) 22 (05/04/23 0700)  BP: 121/67 (05/04/23 0802)  SpO2: 95 % (05/04/23 0802)   Vital Signs (24h Range):  Temp:  [97.7 °F (36.5 °C)] 97.7 °F (36.5 °C)  Pulse:  [77-83] 81  Resp:  [17-22] 22  SpO2:  [95 %-99 %] 95 %  BP: ()/(53-71)  121/67        There is no height or weight on file to calculate BMI.     Physical Exam  Vitals and nursing note reviewed.   Constitutional:       General: He is not in acute distress.     Appearance: Normal appearance. He is not diaphoretic.   HENT:      Head: Normocephalic and atraumatic.      Right Ear: External ear normal.      Left Ear: External ear normal.      Nose: Nose normal. No congestion.      Mouth/Throat:      Mouth: Mucous membranes are dry.      Pharynx: Oropharynx is clear.   Eyes:      Extraocular Movements: Extraocular movements intact.      Conjunctiva/sclera: Conjunctivae normal.   Cardiovascular:      Rate and Rhythm: Normal rate and regular rhythm.      Pulses: Normal pulses.   Pulmonary:      Effort: Pulmonary effort is normal.      Breath sounds: Normal breath sounds. No wheezing.   Abdominal:      General: Abdomen is flat. Bowel sounds are normal.      Palpations: Abdomen is soft.      Tenderness: There is abdominal tenderness (epigastric severe and RUQ mild). There is no guarding or rebound.   Musculoskeletal:         General: Normal range of motion.      Cervical back: Normal range of motion.      Right lower leg: No edema.      Left lower leg: No edema.   Lymphadenopathy:      Cervical: No cervical adenopathy.   Skin:     General: Skin is warm and dry.      Capillary Refill: Capillary refill takes 2 to 3 seconds.      Findings: No rash.   Neurological:      General: No focal deficit present.      Mental Status: He is alert and oriented to person, place, and time.      Sensory: No sensory deficit.      Coordination: Coordination normal.   Psychiatric:         Mood and Affect: Mood normal.         Behavior: Behavior normal.              Significant Labs: All pertinent labs within the past 24 hours have been reviewed.  CBC:   Recent Labs   Lab 05/04/23  0459   WBC 7.74   HGB 12.9*   HCT 41.4        CMP:   Recent Labs   Lab 05/04/23  0459      K 3.6      CO2 22*       BUN 22   CREATININE 1.4   CALCIUM 8.8   PROT 7.4   ALBUMIN 3.9   BILITOT 1.8*   ALKPHOS 102   AST 64*   ALT 30   ANIONGAP 12       Significant Imaging: I have reviewed all pertinent imaging results/findings within the past 24 hours.    Imaging Results              US Abdomen Limited (Gallbladder) (In process)  Result time 05/04/23 08:25:08                     CT Abdomen Pelvis With Contrast (Final result)  Result time 05/04/23 06:39:16      Final result by Sandip Woo MD (05/04/23 06:39:16)                   Impression:      1. Constellation of findings in keeping with acute pancreatitis, as described.  Peripancreatic inflammation and unencapsulated free fluid without discrete drainable fluid collection or abscess appreciated.  2. Wall thickening and mucosal hyperemia of the distal stomach, entirety of the duodenum, and likely proximal duodenum may be reactive to the above.  3. Additionally noted is intrahepatic and extrahepatic biliary dilatation, again felt likely reactive to the above.  Correlation with LFTs would be recommended.  No definite radiodense cholelithiasis or choledocholithiasis appreciated at the present time.  4. Nonspecific pulmonary nodules measuring up to 7 mm.  For multiple solid nodules with any 6 mm or greater, Fleischner Society guidelines recommend follow up with non-contrast chest CT at 3-6 months and 18-24 months after discovery.  5. Nonspecific geographic focus of relative hyperattenuation/hyperenhancement in the liver measuring up to 31 mm.  Correlation with any outside imaging to establish stability be recommended.  If none is available, further characterization with dedicated hepatic mass protocol MRI or CT would be recommended.  6. Additional details, as provided in the body of report.      Electronically signed by: Sandip Woo  Date:    05/04/2023  Time:    06:39               Narrative:    EXAMINATION:  CT ABDOMEN PELVIS WITH CONTRAST    CLINICAL  HISTORY:  Nausea/vomiting;Epigastric pain;    TECHNIQUE:  Low dose axial images, sagittal and coronal reformations were obtained from the lung bases to the pubic symphysis following the IV administration of 100 mL of Omnipaque 350    COMPARISON:  None.    FINDINGS:  Lower chest: Assessment of the lower chest limited due to motion.  7 mm solid nodule right middle lobe marginating the fissure (axial series 2, image 6).  6 mm solid nodule left lower lobe (series 2, image 22).  3-4 mm solid nodule lingula (series 2, image 12).  Dependent atelectasis.    Liver: Normal contour.  Geographic focus of relative hyperattenuation/hyperenhancement anterior right hepatic lobe/medial left hepatic lobe measuring up to 31 mm AP dimension (axial series 2, image 46)..    Gallbladder and bile ducts: Hydropic appearance of the gallbladder without definite focal pericholecystic inflammation or radiodense gallstones.  Mild intrahepatic biliary duct dilatation may shin.  Common duct dilated to least 13 mm as measured in its mid aspect.    Pancreas: Homogeneous appearance of the pancreatic parenchyma.  Peripancreatic inflammation and unencapsulated free fluid.    Spleen: Unremarkable.    Adrenals: Unremarkable.    Kidneys: Subcentimeter hypodense lesions, too small to definitely characterize.  Minor nonspecific bilateral perinephric stranding.    Lymph nodes: Prominent in number mesenteric and retroperitoneal lymph nodes, nonspecific but potentially reactive in etiology.    Bowel and mesentery: No definite evidence of acute bowel obstruction.  Wall thickening and mucosal hyperattenuation/hyperenhancement are noted involving distal stomach in essentially the entirety of the duodenum and likely proximal aspects of the jejunum with adjoining inflammatory changes.    Small hiatal hernia.  Normal appendix.    Abdominal aorta: Nonaneurysmal.  Mild-to-moderate atherosclerotic calcification.    Inferior vena cava: Unremarkable.    Free fluid or  free air: As above.  No definite free air.    Pelvis: Enlarged prostate gland.  Circumferential urinary bladder wall thickening could relate to bladder outlet obstruction.    Body wall: Unremarkable.    Bones: No acute abnormality.  Chronic bilateral L5 pars defects with anterolisthesis of L5 on S1 at least moderate to severe narrowing of bilateral neural foramina and likely impingement of the exiting L5 nerve roots.                                        Assessment/Plan:     * Acute idiopathic pancreatitis without infection or necrosis  Lipase >1000 on admission  CT scan with pancreatitis and mild CBD dilation to 13mm thought to be reactive. No signs of cholelithiasis or choledocolithiasis.    Plan:  - LR 150cc/hr IVF  - Clear liquid diet, ADAT  - Nausea control with zofran and promethazine  - Pain control with tylenol and dilaudid PRN  - RUQ US pending. Plan to consult general surgery for possible cholecystectomy pending results  - Plan to obtain lipid panel to evaluate triglyceride level      Secondary hypertension  Continue home coreg 6.25mg BID      Chronic heart failure with preserved ejection fraction  No longer taking lasix or bumex. Not fluid overloaded on exam.   Will monitor fluid status closely while getting IVF  Continue home coreg      BPH (benign prostatic hyperplasia)  No longer taking flomax. Will monitor for UOP and re-start if needed      Hypothyroidism  Not currently taking levothyroxine. Last TSH in December was 6  Will obtain TSH and FT4      Type 2 diabetes mellitus, without long-term current use of insulin  Previously on metformin and repaglinide, no longer taking  Will obtain Hgb A1C and start MDSSI while inpatient  Continue home gabapentin 100mg TID    VTE Risk Mitigation (From admission, onward)           Ordered     enoxaparin injection 40 mg  Daily         05/04/23 0753     IP VTE HIGH RISK PATIENT  Once         05/04/23 0753     Place sequential compression device  Until discontinued          05/04/23 0753                           On 05/04/2023, patient should be placed in hospital observation services under my care in collaboration with Dr. Vargas.    Osman Gomez, DO  [facility], PGY-3

## 2023-05-04 NOTE — ACP (ADVANCE CARE PLANNING)
Advance Care Planning  Code Status  In light of the patients hospital admission, we reviewed what the patients preferences for care would be at the very end of life.  The patient wishes to have full treatment including CPR and other invasive treatments performed when his heart and/or breathing stops.  I communicated to the patient that a FULL CODE order would be placed in his medical record to reflect this preference. His daughter Kimberly is surrogate decision maker in the event he is unable to make decisions for himself. I spent a total of 15 minutes engaging the patient in this advance care planning discussion.     Osman Gomez,   \[facility]\"" Family Medicine, PGY-3

## 2023-05-04 NOTE — ED PROVIDER NOTES
Encounter Date: 5/4/2023       History     Chief Complaint   Patient presents with    Abdominal Pain     Patient brought in by grandsons. Started with epigastric pain around 1-2 am. Reports N/V x 6 episodes. Reports cough and chills. Last BM was tonight and normal.      83-year-old male with history of diabetes, hypothyroid presenting for evaluation due to epigastric pain nausea and vomiting starting approximately 2 hours ago.  No constipation or diarrhea.  No fever but did describe chills.  No history of prior abdominal surgeries.  Normal bowel movement last night.  Last meal approximally 9:30 p.m.    The history is provided by the patient.   Review of patient's allergies indicates:   Allergen Reactions    Augmentin [amoxicillin-pot clavulanate] Anaphylaxis     Past Medical History:   Diagnosis Date    Hypothyroid 2019    Type 2 diabetes mellitus 2019     Past Surgical History:   Procedure Laterality Date    JOINT REPLACEMENT Right     knee    PERCUTANEOUS CRYOTHERAPY OF PERIPHERAL NERVE USING LIQUID NITROUS OXIDE IN CLOSED NEEDLE DEVICE Right 1/24/2019    Procedure: CRYOTHERAPY, NERVE, PERIPHERAL, PERCUTANEOUS, USING LIQUID NITROUS OXIDE IN CLOSED NEEDLE DEVICE;  Surgeon: MONTANA Sutherland;  Location: [facility] OR;  Service: Orthopedics;  Laterality: Right;     No family history on file.  Social History     Tobacco Use    Smoking status: Never    Smokeless tobacco: Never   Substance Use Topics    Alcohol use: No    Drug use: Never     Review of Systems   All other systems reviewed and are negative.    Physical Exam     Initial Vitals [05/04/23 0439]   BP Pulse Resp Temp SpO2   (!) 116/55 83 17 97.7 °F (36.5 °C) 99 %      MAP       --         Physical Exam    Nursing note and vitals reviewed.  Constitutional: He appears well-developed and well-nourished. No distress.   HENT:   Head: Normocephalic and atraumatic.   Right Ear: External ear normal.   Left Ear: External ear normal.   Nose: Nose normal.   Mouth/Throat:  Oropharynx is clear and moist.   Eyes: Conjunctivae and EOM are normal. Pupils are equal, round, and reactive to light.   Neck: Neck supple.   Normal range of motion.  Cardiovascular:  Normal rate and regular rhythm.           No murmur heard.  Pulmonary/Chest: Breath sounds normal. No stridor. No respiratory distress. He has no wheezes. He has no rhonchi. He has no rales.   Abdominal: Abdomen is soft. Bowel sounds are normal. There is abdominal tenderness in the right upper quadrant and epigastric area. No hernia. There is guarding. There is no rebound.   Musculoskeletal:         General: No edema. Normal range of motion.      Cervical back: Normal range of motion and neck supple.     Neurological: He is alert and oriented to person, place, and time. He has normal strength. No cranial nerve deficit or sensory deficit.   Skin: Skin is warm and dry. No rash noted.   Psychiatric: He has a normal mood and affect. Thought content normal.       ED Course   Procedures  Labs Reviewed   CBC W/ AUTO DIFFERENTIAL - Abnormal; Notable for the following components:       Result Value    Hemoglobin 12.9 (*)     MCV 81 (*)     MCH 25.3 (*)     MCHC 31.2 (*)     MPV 9.1 (*)     Lymph # 0.3 (*)     Mono # 0.0 (*)     Gran % 95.1 (*)     Lymph % 3.4 (*)     Mono % 0.5 (*)     All other components within normal limits   COMPREHENSIVE METABOLIC PANEL - Abnormal; Notable for the following components:    CO2 22 (*)     Total Bilirubin 1.8 (*)     AST 64 (*)     eGFR 49 (*)     All other components within normal limits   LIPASE - Abnormal; Notable for the following components:    Lipase >1000 (*)     All other components within normal limits   URINALYSIS, REFLEX TO URINE CULTURE - Abnormal; Notable for the following components:    Specific Gravity, UA >1.030 (*)     All other components within normal limits    Narrative:     Specimen Source->Urine   LIPID PANEL - Abnormal; Notable for the following components:    Cholesterol 200 (*)      All other components within normal limits   HEMOGLOBIN A1C - Abnormal; Notable for the following components:    Hemoglobin A1C 5.9 (*)     All other components within normal limits   LACTIC ACID, PLASMA   HEMOGLOBIN A1C   LIPID PANEL   T4, FREE   TSH   POCT GLUCOSE, HAND-HELD DEVICE     EKG Readings: (Independently Interpreted)   Initial Reading: No STEMI. Rhythm: Normal Sinus Rhythm. Heart Rate: 80. Ectopy: No Ectopy. Conduction: Normal. ST Segments: Normal ST Segments. T Waves: Normal. Clinical Impression: Normal Sinus Rhythm   ECG Results              EKG 12-lead (In process)  Result time 05/04/23 10:52:38      In process by Interface, Lab In [facility] (05/04/23 10:52:38)                   Narrative:    Test Reason : R10.13,    Vent. Rate : 080 BPM     Atrial Rate : 080 BPM     P-R Int : 154 ms          QRS Dur : 086 ms      QT Int : 368 ms       P-R-T Axes : 069 029 017 degrees     QTc Int : 424 ms    Normal sinus rhythm  Low voltage QRS  Borderline Abnormal ECG  When compared with ECG of 04-MAY-2023 04:54,  No significant change was found    Referred By: [email or web address]   SELF           Confirmed By:                                   Imaging Results              US Abdomen Limited (Gallbladder) (Final result)  Result time 05/04/23 08:30:47      Final result by Sandip Woo MD (05/04/23 08:30:47)                   Impression:      1. No evidence of cholelithiasis or acute cholecystitis.  2. Intrahepatic and extrahepatic bile duct dilatation.  No definite sonographic evidence of choledocholithiasis at present time.  Correlation with LFTs would be recommended.  3. Additional details, as provided in the body of report.      Electronically signed by: Sandip Woo  Date:    05/04/2023  Time:    08:30               Narrative:    EXAMINATION:  US ABDOMEN LIMITED    CLINICAL HISTORY:  Right upper quadrant pain    TECHNIQUE:  Limited ultrasound of the right upper quadrant of the abdomen (including pancreas, liver,  gallbladder, common bile duct, and spleen) was performed.    COMPARISON:  CT of the abdomen pelvis performed 05/04/2023.    FINDINGS:  Gallbladder: No calculi, wall thickening, or pericholecystic fluid.  No sonographic Rich's sign.    Biliary system: The common duct is mildly prominent for age, measuring 9-10 mm.  Mild intrahepatic biliary duct dilatation.    Miscellaneous: Inflammatory changes about the pancreas, as seen on earlier same day CT of the abdomen and pelvis.  No evidence of right-sided hydronephrosis.                                       CT Abdomen Pelvis With Contrast (Final result)  Result time 05/04/23 06:39:16      Final result by Sandip Woo MD (05/04/23 06:39:16)                   Impression:      1. Constellation of findings in keeping with acute pancreatitis, as described.  Peripancreatic inflammation and unencapsulated free fluid without discrete drainable fluid collection or abscess appreciated.  2. Wall thickening and mucosal hyperemia of the distal stomach, entirety of the duodenum, and likely proximal duodenum may be reactive to the above.  3. Additionally noted is intrahepatic and extrahepatic biliary dilatation, again felt likely reactive to the above.  Correlation with LFTs would be recommended.  No definite radiodense cholelithiasis or choledocholithiasis appreciated at the present time.  4. Nonspecific pulmonary nodules measuring up to 7 mm.  For multiple solid nodules with any 6 mm or greater, Fleischner Society guidelines recommend follow up with non-contrast chest CT at 3-6 months and 18-24 months after discovery.  5. Nonspecific geographic focus of relative hyperattenuation/hyperenhancement in the liver measuring up to 31 mm.  Correlation with any outside imaging to establish stability be recommended.  If none is available, further characterization with dedicated hepatic mass protocol MRI or CT would be recommended.  6. Additional details, as provided in the body of  report.      Electronically signed by: Sandip Helmmerrick  Date:    05/04/2023  Time:    06:39               Narrative:    EXAMINATION:  CT ABDOMEN PELVIS WITH CONTRAST    CLINICAL HISTORY:  Nausea/vomiting;Epigastric pain;    TECHNIQUE:  Low dose axial images, sagittal and coronal reformations were obtained from the lung bases to the pubic symphysis following the IV administration of 100 mL of Omnipaque 350    COMPARISON:  None.    FINDINGS:  Lower chest: Assessment of the lower chest limited due to motion.  7 mm solid nodule right middle lobe marginating the fissure (axial series 2, image 6).  6 mm solid nodule left lower lobe (series 2, image 22).  3-4 mm solid nodule lingula (series 2, image 12).  Dependent atelectasis.    Liver: Normal contour.  Geographic focus of relative hyperattenuation/hyperenhancement anterior right hepatic lobe/medial left hepatic lobe measuring up to 31 mm AP dimension (axial series 2, image 46)..    Gallbladder and bile ducts: Hydropic appearance of the gallbladder without definite focal pericholecystic inflammation or radiodense gallstones.  Mild intrahepatic biliary duct dilatation may shin.  Common duct dilated to least 13 mm as measured in its mid aspect.    Pancreas: Homogeneous appearance of the pancreatic parenchyma.  Peripancreatic inflammation and unencapsulated free fluid.    Spleen: Unremarkable.    Adrenals: Unremarkable.    Kidneys: Subcentimeter hypodense lesions, too small to definitely characterize.  Minor nonspecific bilateral perinephric stranding.    Lymph nodes: Prominent in number mesenteric and retroperitoneal lymph nodes, nonspecific but potentially reactive in etiology.    Bowel and mesentery: No definite evidence of acute bowel obstruction.  Wall thickening and mucosal hyperattenuation/hyperenhancement are noted involving distal stomach in essentially the entirety of the duodenum and likely proximal aspects of the jejunum with adjoining inflammatory  changes.    Small hiatal hernia.  Normal appendix.    Abdominal aorta: Nonaneurysmal.  Mild-to-moderate atherosclerotic calcification.    Inferior vena cava: Unremarkable.    Free fluid or free air: As above.  No definite free air.    Pelvis: Enlarged prostate gland.  Circumferential urinary bladder wall thickening could relate to bladder outlet obstruction.    Body wall: Unremarkable.    Bones: No acute abnormality.  Chronic bilateral L5 pars defects with anterolisthesis of L5 on S1 at least moderate to severe narrowing of bilateral neural foramina and likely impingement of the exiting L5 nerve roots.                                       Medications   sodium chloride 0.9% flush 5 mL (has no administration in time range)   melatonin tablet 9 mg (has no administration in time range)   senna-docusate 8.6-50 mg per tablet 1 tablet (has no administration in time range)   acetaminophen tablet 650 mg (has no administration in time range)   naloxone 0.4 mg/mL injection 0.02 mg (has no administration in time range)   insulin aspart U-100 pen 1-10 Units (has no administration in time range)   glucagon (human recombinant) injection 1 mg (has no administration in time range)   dextrose 10% bolus 125 mL 125 mL (has no administration in time range)   dextrose 10% bolus 250 mL 250 mL (has no administration in time range)   dextrose 40 % gel 15,000 mg (has no administration in time range)   dextrose 40 % gel 30,000 mg (has no administration in time range)   HYDROmorphone injection 0.5 mg (0.5 mg Intravenous Given 5/4/23 2107)   ondansetron injection 8 mg (has no administration in time range)   prochlorperazine injection Soln 5 mg (has no administration in time range)   lactated ringers infusion ( Intravenous New Bag 5/4/23 2310)   carvediloL tablet 6.25 mg (6.25 mg Oral Given 5/4/23 1721)   gabapentin capsule 100 mg (100 mg Oral Given 5/4/23 2100)   levothyroxine tablet 25 mcg (has no administration in time range)   morphine  injection 4 mg (4 mg Intravenous Given 5/4/23 0509)   ondansetron injection 4 mg (4 mg Intravenous Given 5/4/23 0508)   sodium chloride 0.9% bolus 1,000 mL 1,000 mL (0 mLs Intravenous Stopped 5/4/23 0746)   iohexoL (OMNIPAQUE 350) injection 100 mL (100 mLs Intravenous Given 5/4/23 0623)   fentaNYL 50 mcg/mL injection 50 mcg (50 mcg Intravenous Given 5/4/23 0700)     Medical Decision Making:   Independently Interpreted Test(s):   I have ordered and independently interpreted EKG Reading(s) - see prior notes  Clinical Tests:   Lab Tests: Ordered and Reviewed  Radiological Study: Ordered and Reviewed  Medical Tests: Ordered and Reviewed  ED Management:  83-year-old male presenting with epigastric pain, nausea, vomiting.  Workup was initiated by myself and then care was transitioned to oncoming physician, Dr. Ba, pending return of lab work and CT imaging.  Other:   I have discussed this case with another health care provider.                        Clinical Impression:   Final diagnoses:  [R10.13] Epigastric pain  [R10.11] RUQ pain  [K85.90] Acute pancreatitis        ED Disposition Condition    Observation Stable                Thompson Oleary MD  05/05/23 3942

## 2023-05-04 NOTE — SUBJECTIVE & OBJECTIVE
Past Medical History:   Diagnosis Date    Hypothyroid 2019    Type 2 diabetes mellitus 2019       Past Surgical History:   Procedure Laterality Date    JOINT REPLACEMENT Right     knee    PERCUTANEOUS CRYOTHERAPY OF PERIPHERAL NERVE USING LIQUID NITROUS OXIDE IN CLOSED NEEDLE DEVICE Right 1/24/2019    Procedure: CRYOTHERAPY, NERVE, PERIPHERAL, PERCUTANEOUS, USING LIQUID NITROUS OXIDE IN CLOSED NEEDLE DEVICE;  Surgeon: MONTANA Sutherland;  Location: [facility];  Service: Orthopedics;  Laterality: Right;       Review of patient's allergies indicates:   Allergen Reactions    Augmentin [amoxicillin-pot clavulanate] Anaphylaxis       Current Facility-Administered Medications on File Prior to Encounter   Medication    tranexamic acid (LYSTEDA) tablet 1,950 mg     Current Outpatient Medications on File Prior to Encounter   Medication Sig    gabapentin (NEURONTIN) 100 MG capsule Take 1 capsule by mouth every 8 hours.    aspirin (ECOTRIN) 81 MG EC tablet Take 1 tablet (81 mg total) by mouth 2 (two) times daily.    carvediloL (COREG) 6.25 MG tablet Take 1 tablet by mouth every 12 hours.    clobetasoL (TEMOVATE) 0.05 % cream Apply topically to affected area daily.    famotidine (PEPCID) 20 MG tablet Take 1 tablet (20 mg total) by mouth 2 (two) times daily.    timolol maleate 0.5% (TIMOPTIC) 0.5 % Drop INSTILL 1 DROP INTO EACH EYE TWICE DAILY    [DISCONTINUED] bumetanide (BUMEX) 1 MG tablet Take 1 tablet (1 mg total) by mouth every morning.    [DISCONTINUED] cephALEXin (KEFLEX) 500 MG capsule Take 500 mg by mouth every 6 (six) hours.    [DISCONTINUED] cholecalciferol, vitamin D3, 50,000 unit capsule Take 1 capsule by mouth once weekly.    [DISCONTINUED] ciprofloxacin HCl (CIPRO) 500 MG tablet TAKE 1 TABLET BY MOUTH EVERY 12 HOURS    [DISCONTINUED] doxycycline (VIBRA-TABS) 100 MG tablet Take 1 tablet by mouth every 12 hours.    [DISCONTINUED] ergocalciferol (ERGOCALCIFEROL) 50,000 unit Cap Take 1 capsule (50,000 Units total) by  mouth every 7 days.    [DISCONTINUED] furosemide (LASIX) 20 MG tablet Take 1 tablet by mouth everyday.    [DISCONTINUED] levothyroxine (SYNTHROID) 25 MCG tablet Take 25 mcg by mouth once daily.    [DISCONTINUED] meloxicam (MOBIC) 15 MG tablet TAKE 1 TABLET BY MOUTH DAILY WITH FOOD  FOR PAIN    [DISCONTINUED] metFORMIN (GLUCOPHAGE) 500 MG tablet Take 500 mg by mouth 2 (two) times daily with meals.    [DISCONTINUED] repaglinide (PRANDIN) 1 MG tablet TAKE 1 TABLET BY MOUTH DAILY    [DISCONTINUED] tamsulosin (FLOMAX) 0.4 mg Cap Take 1 capsule (0.4 mg total) by mouth once daily.     Family History    None       Tobacco Use    Smoking status: Never    Smokeless tobacco: Never   Substance and Sexual Activity    Alcohol use: No    Drug use: Never    Sexual activity: Not on file     Review of Systems   Constitutional:  Positive for appetite change. Negative for fatigue and fever.   HENT:  Negative for congestion, ear pain and sore throat.    Eyes:  Negative for visual disturbance.   Respiratory:  Negative for chest tightness, shortness of breath and wheezing.    Cardiovascular:  Negative for chest pain, palpitations and leg swelling.   Gastrointestinal:  Positive for abdominal pain, nausea and vomiting. Negative for constipation and diarrhea.   Endocrine: Negative for polydipsia and polyuria.   Genitourinary:  Negative for decreased urine volume and difficulty urinating.   Musculoskeletal:  Negative for back pain and gait problem.   Skin:  Negative for rash.   Allergic/Immunologic: Negative for environmental allergies.   Neurological:  Negative for dizziness, light-headedness and headaches.   Psychiatric/Behavioral:  Negative for behavioral problems and confusion.    Objective:     Vital Signs (Most Recent):  Temp: 97.7 °F (36.5 °C) (05/04/23 0439)  Pulse: 81 (05/04/23 0802)  Resp: (!) 22 (05/04/23 0700)  BP: 121/67 (05/04/23 0802)  SpO2: 95 % (05/04/23 0802)   Vital Signs (24h Range):  Temp:  [97.7 °F (36.5 °C)] 97.7 °F  (36.5 °C)  Pulse:  [77-83] 81  Resp:  [17-22] 22  SpO2:  [95 %-99 %] 95 %  BP: ()/(53-71) 121/67        There is no height or weight on file to calculate BMI.     Physical Exam  Vitals and nursing note reviewed.   Constitutional:       General: He is not in acute distress.     Appearance: Normal appearance. He is not diaphoretic.   HENT:      Head: Normocephalic and atraumatic.      Right Ear: External ear normal.      Left Ear: External ear normal.      Nose: Nose normal. No congestion.      Mouth/Throat:      Mouth: Mucous membranes are dry.      Pharynx: Oropharynx is clear.   Eyes:      Extraocular Movements: Extraocular movements intact.      Conjunctiva/sclera: Conjunctivae normal.   Cardiovascular:      Rate and Rhythm: Normal rate and regular rhythm.      Pulses: Normal pulses.   Pulmonary:      Effort: Pulmonary effort is normal.      Breath sounds: Normal breath sounds. No wheezing.   Abdominal:      General: Abdomen is flat. Bowel sounds are normal.      Palpations: Abdomen is soft.      Tenderness: There is abdominal tenderness (epigastric severe and RUQ mild). There is no guarding or rebound.   Musculoskeletal:         General: Normal range of motion.      Cervical back: Normal range of motion.      Right lower leg: No edema.      Left lower leg: No edema.   Lymphadenopathy:      Cervical: No cervical adenopathy.   Skin:     General: Skin is warm and dry.      Capillary Refill: Capillary refill takes 2 to 3 seconds.      Findings: No rash.   Neurological:      General: No focal deficit present.      Mental Status: He is alert and oriented to person, place, and time.      Sensory: No sensory deficit.      Coordination: Coordination normal.   Psychiatric:         Mood and Affect: Mood normal.         Behavior: Behavior normal.              Significant Labs: All pertinent labs within the past 24 hours have been reviewed.  CBC:   Recent Labs   Lab 05/04/23  0459   WBC 7.74   HGB 12.9*   HCT 41.4   PLT  237     CMP:   Recent Labs   Lab 05/04/23  0459      K 3.6      CO2 22*      BUN 22   CREATININE 1.4   CALCIUM 8.8   PROT 7.4   ALBUMIN 3.9   BILITOT 1.8*   ALKPHOS 102   AST 64*   ALT 30   ANIONGAP 12       Significant Imaging: I have reviewed all pertinent imaging results/findings within the past 24 hours.    Imaging Results              US Abdomen Limited (Gallbladder) (In process)  Result time 05/04/23 08:25:08                     CT Abdomen Pelvis With Contrast (Final result)  Result time 05/04/23 06:39:16      Final result by Sandip Woo MD (05/04/23 06:39:16)                   Impression:      1. Constellation of findings in keeping with acute pancreatitis, as described.  Peripancreatic inflammation and unencapsulated free fluid without discrete drainable fluid collection or abscess appreciated.  2. Wall thickening and mucosal hyperemia of the distal stomach, entirety of the duodenum, and likely proximal duodenum may be reactive to the above.  3. Additionally noted is intrahepatic and extrahepatic biliary dilatation, again felt likely reactive to the above.  Correlation with LFTs would be recommended.  No definite radiodense cholelithiasis or choledocholithiasis appreciated at the present time.  4. Nonspecific pulmonary nodules measuring up to 7 mm.  For multiple solid nodules with any 6 mm or greater, Fleischner Society guidelines recommend follow up with non-contrast chest CT at 3-6 months and 18-24 months after discovery.  5. Nonspecific geographic focus of relative hyperattenuation/hyperenhancement in the liver measuring up to 31 mm.  Correlation with any outside imaging to establish stability be recommended.  If none is available, further characterization with dedicated hepatic mass protocol MRI or CT would be recommended.  6. Additional details, as provided in the body of report.      Electronically signed by: Sandip Woo  Date:    05/04/2023  Time:    06:39                Narrative:    EXAMINATION:  CT ABDOMEN PELVIS WITH CONTRAST    CLINICAL HISTORY:  Nausea/vomiting;Epigastric pain;    TECHNIQUE:  Low dose axial images, sagittal and coronal reformations were obtained from the lung bases to the pubic symphysis following the IV administration of 100 mL of Omnipaque 350    COMPARISON:  None.    FINDINGS:  Lower chest: Assessment of the lower chest limited due to motion.  7 mm solid nodule right middle lobe marginating the fissure (axial series 2, image 6).  6 mm solid nodule left lower lobe (series 2, image 22).  3-4 mm solid nodule lingula (series 2, image 12).  Dependent atelectasis.    Liver: Normal contour.  Geographic focus of relative hyperattenuation/hyperenhancement anterior right hepatic lobe/medial left hepatic lobe measuring up to 31 mm AP dimension (axial series 2, image 46)..    Gallbladder and bile ducts: Hydropic appearance of the gallbladder without definite focal pericholecystic inflammation or radiodense gallstones.  Mild intrahepatic biliary duct dilatation may shin.  Common duct dilated to least 13 mm as measured in its mid aspect.    Pancreas: Homogeneous appearance of the pancreatic parenchyma.  Peripancreatic inflammation and unencapsulated free fluid.    Spleen: Unremarkable.    Adrenals: Unremarkable.    Kidneys: Subcentimeter hypodense lesions, too small to definitely characterize.  Minor nonspecific bilateral perinephric stranding.    Lymph nodes: Prominent in number mesenteric and retroperitoneal lymph nodes, nonspecific but potentially reactive in etiology.    Bowel and mesentery: No definite evidence of acute bowel obstruction.  Wall thickening and mucosal hyperattenuation/hyperenhancement are noted involving distal stomach in essentially the entirety of the duodenum and likely proximal aspects of the jejunum with adjoining inflammatory changes.    Small hiatal hernia.  Normal appendix.    Abdominal aorta: Nonaneurysmal.  Mild-to-moderate atherosclerotic  calcification.    Inferior vena cava: Unremarkable.    Free fluid or free air: As above.  No definite free air.    Pelvis: Enlarged prostate gland.  Circumferential urinary bladder wall thickening could relate to bladder outlet obstruction.    Body wall: Unremarkable.    Bones: No acute abnormality.  Chronic bilateral L5 pars defects with anterolisthesis of L5 on S1 at least moderate to severe narrowing of bilateral neural foramina and likely impingement of the exiting L5 nerve roots.

## 2023-05-04 NOTE — PROVIDER PROGRESS NOTES - EMERGENCY DEPT.
Encounter Date: 5/4/2023    ED Physician Progress Notes        Physician Note:   This is an assumption of care note.     Upon shift change, the patient was transferred to me from Dr. Oleary  @ 6:00 AM in stable condition.     Patient presented to ED with chief complaint of  abdominal pain     Update:   No acute events during shift.  Plan is shift change was to follow-up on labs pending including lipase and CT imaging.  Patient's lipase is greater than a 1000.  CT imaging demonstrates findings consistent with acute pancreatitis with hepatic duct dilation without gallstones.  Will plan to order a right upper quadrant ultrasound given patient's location of right upper quadrant/epigastric pain.  On reassessment patient reports improvement of his pain however he is still moderately tender on exam to his epigastrium and right quadrant he will be admitted to [facility] for further  treatment of acute pancreatitis      IMAGING:  Imaging Results          CT Abdomen Pelvis With Contrast (Final result)  Result time 05/04/23   06:39:16    Final result by Sandip Woo MD (05/04/23 06:39:16)                 Impression:      1. Constellation of findings in keeping with acute pancreatitis, as   described.  Peripancreatic inflammation and unencapsulated free fluid   without discrete drainable fluid collection or abscess appreciated.  2. Wall thickening and mucosal hyperemia of the distal stomach, entirety   of the duodenum, and likely proximal duodenum may be reactive to the   above.  3. Additionally noted is intrahepatic and extrahepatic biliary dilatation,   again felt likely reactive to the above.  Correlation with LFTs would be   recommended.  No definite radiodense cholelithiasis or choledocholithiasis   appreciated at the present time.  4. Nonspecific pulmonary nodules measuring up to 7 mm.  For multiple solid   nodules with any 6 mm or greater, Fleischner Society guidelines recommend   follow up with  non-contrast chest CT at 3-6 months and 18-24 months after   discovery.  5. Nonspecific geographic focus of relative   hyperattenuation/hyperenhancement in the liver measuring up to 31 mm.    Correlation with any outside imaging to establish stability be   recommended.  If none is available, further characterization with   dedicated hepatic mass protocol MRI or CT would be recommended.  6. Additional details, as provided in the body of report.      Electronically signed by: Sandip Woo  Date:    05/04/2023  Time:    06:39             Narrative:    EXAMINATION:  CT ABDOMEN PELVIS WITH CONTRAST    CLINICAL HISTORY:  Nausea/vomiting;Epigastric pain;    TECHNIQUE:  Low dose axial images, sagittal and coronal reformations were obtained   from the lung bases to the pubic symphysis following the IV administration   of 100 mL of Omnipaque 350    COMPARISON:  None.    FINDINGS:  Lower chest: Assessment of the lower chest limited due to motion.  7 mm   solid nodule right middle lobe marginating the fissure (axial series 2,   image 6).  6 mm solid nodule left lower lobe (series 2, image 22).  3-4 mm   solid nodule lingula (series 2, image 12).  Dependent atelectasis.    Liver: Normal contour.  Geographic focus of relative   hyperattenuation/hyperenhancement anterior right hepatic lobe/medial left   hepatic lobe measuring up to 31 mm AP dimension (axial series 2, image   46)..    Gallbladder and bile ducts: Hydropic appearance of the gallbladder without   definite focal pericholecystic inflammation or radiodense gallstones.    Mild intrahepatic biliary duct dilatation may shin.  Common duct dilated   to least 13 mm as measured in its mid aspect.    Pancreas: Homogeneous appearance of the pancreatic parenchyma.    Peripancreatic inflammation and unencapsulated free fluid.    Spleen: Unremarkable.    Adrenals: Unremarkable.    Kidneys: Subcentimeter hypodense lesions, too small to definitely   characterize.  Minor nonspecific  bilateral perinephric stranding.    Lymph nodes: Prominent in number mesenteric and retroperitoneal lymph   nodes, nonspecific but potentially reactive in etiology.    Bowel and mesentery: No definite evidence of acute bowel obstruction.    Wall thickening and mucosal hyperattenuation/hyperenhancement are noted   involving distal stomach in essentially the entirety of the duodenum and   likely proximal aspects of the jejunum with adjoining inflammatory   changes.    Small hiatal hernia.  Normal appendix.    Abdominal aorta: Nonaneurysmal.  Mild-to-moderate atherosclerotic   calcification.    Inferior vena cava: Unremarkable.    Free fluid or free air: As above.  No definite free air.    Pelvis: Enlarged prostate gland.  Circumferential urinary bladder wall   thickening could relate to bladder outlet obstruction.    Body wall: Unremarkable.    Bones: No acute abnormality.  Chronic bilateral L5 pars defects with   anterolisthesis of L5 on S1 at least moderate to severe narrowing of   bilateral neural foramina and likely impingement of the exiting L5 nerve   roots.                                LABS:  Labs Reviewed  CBC W/ AUTO DIFFERENTIAL - Abnormal; Notable for the following components:     Hemoglobin                    12.9 (*)               MCV                           81 (*)                 MCH                           25.3 (*)               MCHC                          31.2 (*)               MPV                           9.1 (*)                Lymph #                       0.3 (*)                Mono #                        0.0 (*)                Gran %                        95.1 (*)               Lymph %                       3.4 (*)                Mono %                        0.5 (*)             All other components within normal limits  COMPREHENSIVE METABOLIC PANEL - Abnormal; Notable for the following components:     CO2                           22 (*)                 Total Bilirubin                1.8 (*)                AST                           64 (*)                 eGFR                          49 (*)              All other components within normal limits  LIPASE - Abnormal; Notable for the following components:     Lipase                        >1000 (*)            All other components within normal limits  LACTIC ACID, PLASMA  URINALYSIS, REFLEX TO URINE CULTURE      MEDICATIONS:  Medications  sodium chloride 0.9% bolus 1,000 mL 1,000 mL (1,000 mLs Intravenous New Bag 5/4/23 0664)  fentaNYL 50 mcg/mL injection 50 mcg (has no administration in time range)  morphine injection 4 mg (4 mg Intravenous Given 5/4/23 9629)  ondansetron injection 4 mg (4 mg Intravenous Given 5/4/23 7718)  iohexoL (OMNIPAQUE 350) injection 100 mL (100 mLs Intravenous Given 5/4/23 6370)       IMPRESSION:  Epigastric pain  RUQ pain       DISCHARGE MEDICATIONS:  New Prescriptions  No medications on file      DISPOSITION:  Observation

## 2023-05-04 NOTE — ED NOTES
Received report from Darline FIELD; pain med admin due to 10/10 pain w/o relief from Morphine, siderails x2, family at bs, given water/ice per MD approval, IVF noted, cardiac monitoring devices in place, call light in reach and instructed how to use.

## 2023-05-05 PROBLEM — N17.9 AKI (ACUTE KIDNEY INJURY): Status: ACTIVE | Noted: 2023-05-05

## 2023-05-05 LAB
ALBUMIN SERPL BCP-MCNC: 2.8 G/DL (ref 3.5–5.2)
ALP SERPL-CCNC: 59 U/L (ref 55–135)
ALT SERPL W/O P-5'-P-CCNC: 31 U/L (ref 10–44)
ANION GAP SERPL CALC-SCNC: 8 MMOL/L (ref 8–16)
ANION GAP SERPL CALC-SCNC: 9 MMOL/L (ref 8–16)
ANISOCYTOSIS BLD QL SMEAR: SLIGHT
AST SERPL-CCNC: 49 U/L (ref 10–40)
BASOPHILS NFR BLD: 0 % (ref 0–1.9)
BILIRUB SERPL-MCNC: 1.5 MG/DL (ref 0.1–1)
BUN SERPL-MCNC: 26 MG/DL (ref 8–23)
BUN SERPL-MCNC: 30 MG/DL (ref 8–23)
BURR CELLS BLD QL SMEAR: ABNORMAL
CALCIUM SERPL-MCNC: 7.8 MG/DL (ref 8.7–10.5)
CALCIUM SERPL-MCNC: 7.9 MG/DL (ref 8.7–10.5)
CHLORIDE SERPL-SCNC: 106 MMOL/L (ref 95–110)
CHLORIDE SERPL-SCNC: 107 MMOL/L (ref 95–110)
CO2 SERPL-SCNC: 20 MMOL/L (ref 23–29)
CO2 SERPL-SCNC: 20 MMOL/L (ref 23–29)
CREAT SERPL-MCNC: 1.3 MG/DL (ref 0.5–1.4)
CREAT SERPL-MCNC: 1.5 MG/DL (ref 0.5–1.4)
DACRYOCYTES BLD QL SMEAR: ABNORMAL
DIFFERENTIAL METHOD: ABNORMAL
EOSINOPHIL NFR BLD: 2 % (ref 0–8)
ERYTHROCYTE [DISTWIDTH] IN BLOOD BY AUTOMATED COUNT: 14.9 % (ref 11.5–14.5)
EST. GFR  (NO RACE VARIABLE): 45 ML/MIN/1.73 M^2
EST. GFR  (NO RACE VARIABLE): 54 ML/MIN/1.73 M^2
GLUCOSE SERPL-MCNC: 76 MG/DL (ref 70–110)
GLUCOSE SERPL-MCNC: 80 MG/DL (ref 70–110)
HCT VFR BLD AUTO: 37.3 % (ref 40–54)
HGB BLD-MCNC: 11.9 G/DL (ref 14–18)
IMM GRANULOCYTES # BLD AUTO: ABNORMAL K/UL (ref 0–0.04)
IMM GRANULOCYTES NFR BLD AUTO: ABNORMAL % (ref 0–0.5)
LYMPHOCYTES NFR BLD: 9 % (ref 18–48)
MAGNESIUM SERPL-MCNC: 1.5 MG/DL (ref 1.6–2.6)
MCH RBC QN AUTO: 25.6 PG (ref 27–31)
MCHC RBC AUTO-ENTMCNC: 31.9 G/DL (ref 32–36)
MCV RBC AUTO: 80 FL (ref 82–98)
MONOCYTES NFR BLD: 6 % (ref 4–15)
NEUTROPHILS NFR BLD: 83 % (ref 38–73)
NRBC BLD-RTO: 0 /100 WBC
OVALOCYTES BLD QL SMEAR: ABNORMAL
PHOSPHATE SERPL-MCNC: 4.4 MG/DL (ref 2.7–4.5)
PLATELET # BLD AUTO: 184 K/UL (ref 150–450)
PLATELET BLD QL SMEAR: ABNORMAL
PMV BLD AUTO: 9.4 FL (ref 9.2–12.9)
POCT GLUCOSE: 54 MG/DL (ref 70–110)
POCT GLUCOSE: 70 MG/DL (ref 70–110)
POCT GLUCOSE: 70 MG/DL (ref 70–110)
POCT GLUCOSE: 75 MG/DL (ref 70–110)
POCT GLUCOSE: 75 MG/DL (ref 70–110)
POIKILOCYTOSIS BLD QL SMEAR: SLIGHT
POTASSIUM SERPL-SCNC: 5.3 MMOL/L (ref 3.5–5.1)
POTASSIUM SERPL-SCNC: 5.4 MMOL/L (ref 3.5–5.1)
PROT SERPL-MCNC: 5.7 G/DL (ref 6–8.4)
RBC # BLD AUTO: 4.65 M/UL (ref 4.6–6.2)
SODIUM SERPL-SCNC: 134 MMOL/L (ref 136–145)
SODIUM SERPL-SCNC: 136 MMOL/L (ref 136–145)
WBC # BLD AUTO: 17.2 K/UL (ref 3.9–12.7)

## 2023-05-05 PROCEDURE — 36415 COLL VENOUS BLD VENIPUNCTURE: CPT | Performed by: STUDENT IN AN ORGANIZED HEALTH CARE EDUCATION/TRAINING PROGRAM

## 2023-05-05 PROCEDURE — 85027 COMPLETE CBC AUTOMATED: CPT | Performed by: STUDENT IN AN ORGANIZED HEALTH CARE EDUCATION/TRAINING PROGRAM

## 2023-05-05 PROCEDURE — 84100 ASSAY OF PHOSPHORUS: CPT | Performed by: STUDENT IN AN ORGANIZED HEALTH CARE EDUCATION/TRAINING PROGRAM

## 2023-05-05 PROCEDURE — 96375 TX/PRO/DX INJ NEW DRUG ADDON: CPT

## 2023-05-05 PROCEDURE — G0378 HOSPITAL OBSERVATION PER HR: HCPCS

## 2023-05-05 PROCEDURE — 80048 BASIC METABOLIC PNL TOTAL CA: CPT | Mod: XB | Performed by: STUDENT IN AN ORGANIZED HEALTH CARE EDUCATION/TRAINING PROGRAM

## 2023-05-05 PROCEDURE — 25000003 PHARM REV CODE 250: Performed by: STUDENT IN AN ORGANIZED HEALTH CARE EDUCATION/TRAINING PROGRAM

## 2023-05-05 PROCEDURE — 96365 THER/PROPH/DIAG IV INF INIT: CPT

## 2023-05-05 PROCEDURE — 80053 COMPREHEN METABOLIC PANEL: CPT | Performed by: STUDENT IN AN ORGANIZED HEALTH CARE EDUCATION/TRAINING PROGRAM

## 2023-05-05 PROCEDURE — 63600175 PHARM REV CODE 636 W HCPCS: Performed by: STUDENT IN AN ORGANIZED HEALTH CARE EDUCATION/TRAINING PROGRAM

## 2023-05-05 PROCEDURE — 83735 ASSAY OF MAGNESIUM: CPT | Performed by: STUDENT IN AN ORGANIZED HEALTH CARE EDUCATION/TRAINING PROGRAM

## 2023-05-05 PROCEDURE — 85007 BL SMEAR W/DIFF WBC COUNT: CPT | Mod: NCS | Performed by: STUDENT IN AN ORGANIZED HEALTH CARE EDUCATION/TRAINING PROGRAM

## 2023-05-05 PROCEDURE — 96366 THER/PROPH/DIAG IV INF ADDON: CPT

## 2023-05-05 PROCEDURE — 96361 HYDRATE IV INFUSION ADD-ON: CPT

## 2023-05-05 PROCEDURE — 96376 TX/PRO/DX INJ SAME DRUG ADON: CPT

## 2023-05-05 RX ORDER — FUROSEMIDE 10 MG/ML
20 INJECTION INTRAMUSCULAR; INTRAVENOUS ONCE
Status: COMPLETED | OUTPATIENT
Start: 2023-05-05 | End: 2023-05-05

## 2023-05-05 RX ORDER — MAGNESIUM SULFATE HEPTAHYDRATE 40 MG/ML
2 INJECTION, SOLUTION INTRAVENOUS ONCE
Status: COMPLETED | OUTPATIENT
Start: 2023-05-05 | End: 2023-05-05

## 2023-05-05 RX ADMIN — ACETAMINOPHEN 650 MG: 325 TABLET ORAL at 09:05

## 2023-05-05 RX ADMIN — CARVEDILOL 6.25 MG: 6.25 TABLET, FILM COATED ORAL at 08:05

## 2023-05-05 RX ADMIN — MAGNESIUM SULFATE 2 G: 2 INJECTION INTRAVENOUS at 11:05

## 2023-05-05 RX ADMIN — GABAPENTIN 100 MG: 100 CAPSULE ORAL at 08:05

## 2023-05-05 RX ADMIN — GABAPENTIN 100 MG: 100 CAPSULE ORAL at 09:05

## 2023-05-05 RX ADMIN — FUROSEMIDE 20 MG: 10 INJECTION, SOLUTION INTRAMUSCULAR; INTRAVENOUS at 09:05

## 2023-05-05 RX ADMIN — SODIUM CHLORIDE, POTASSIUM CHLORIDE, SODIUM LACTATE AND CALCIUM CHLORIDE: 600; 310; 30; 20 INJECTION, SOLUTION INTRAVENOUS at 06:05

## 2023-05-05 RX ADMIN — LEVOTHYROXINE SODIUM 25 MCG: 25 TABLET ORAL at 05:05

## 2023-05-05 RX ADMIN — HYDROMORPHONE HYDROCHLORIDE 0.5 MG: 1 INJECTION, SOLUTION INTRAMUSCULAR; INTRAVENOUS; SUBCUTANEOUS at 05:05

## 2023-05-05 RX ADMIN — DEXTROSE MONOHYDRATE 125 ML: 100 INJECTION, SOLUTION INTRAVENOUS at 05:05

## 2023-05-05 NOTE — SUBJECTIVE & OBJECTIVE
Interval History: NAEON. Patient reports feeling good this AM, abdominal pain better. Tolerated fluids well overnight.    Review of Systems   Constitutional:  Positive for appetite change. Negative for fatigue and fever.   HENT:  Negative for congestion, ear pain and sore throat.    Eyes:  Negative for visual disturbance.   Respiratory:  Negative for chest tightness, shortness of breath and wheezing.    Cardiovascular:  Negative for chest pain, palpitations and leg swelling.   Gastrointestinal:  Positive for abdominal pain, nausea and vomiting. Negative for constipation and diarrhea.   Endocrine: Negative for polydipsia and polyuria.   Genitourinary:  Negative for decreased urine volume and difficulty urinating.   Musculoskeletal:  Negative for back pain and gait problem.   Skin:  Negative for rash.   Allergic/Immunologic: Negative for environmental allergies.   Neurological:  Negative for dizziness, light-headedness and headaches.   Psychiatric/Behavioral:  Negative for behavioral problems and confusion.    Objective:     Vital Signs (Most Recent):  Temp: 97.4 °F (36.3 °C) (05/05/23 1210)  Pulse: 83 (05/05/23 1210)  Resp: 20 (05/05/23 1210)  BP: (!) 81/51 (05/05/23 1210)  SpO2: 96 % (05/05/23 1210) Vital Signs (24h Range):  Temp:  [96.2 °F (35.7 °C)-97.6 °F (36.4 °C)] 97.4 °F (36.3 °C)  Pulse:  [78-89] 83  Resp:  [16-20] 20  SpO2:  [92 %-96 %] 96 %  BP: ()/(49-59) 81/51     Weight: 67.4 kg (148 lb 9.4 oz)  Body mass index is 24.73 kg/m².    Intake/Output Summary (Last 24 hours) at 5/5/2023 1419  Last data filed at 5/5/2023 1210  Gross per 24 hour   Intake --   Output 400 ml   Net -400 ml         Physical Exam  Vitals and nursing note reviewed.   Constitutional:       General: He is not in acute distress.     Appearance: Normal appearance. He is not diaphoretic.   HENT:      Head: Normocephalic and atraumatic.      Right Ear: External ear normal.      Left Ear: External ear normal.      Nose: Nose normal. No  congestion.      Mouth/Throat:      Mouth: Mucous membranes are moist.      Pharynx: Oropharynx is clear.   Eyes:      Extraocular Movements: Extraocular movements intact.      Conjunctiva/sclera: Conjunctivae normal.   Cardiovascular:      Rate and Rhythm: Normal rate and regular rhythm.      Pulses: Normal pulses.   Pulmonary:      Effort: Pulmonary effort is normal.      Breath sounds: Normal breath sounds. No wheezing.   Abdominal:      General: Abdomen is flat. Bowel sounds are normal.      Palpations: Abdomen is soft.      Tenderness: There is abdominal tenderness. There is no guarding or rebound.   Musculoskeletal:         General: Normal range of motion.      Cervical back: Normal range of motion.      Right lower leg: No edema.      Left lower leg: No edema.   Lymphadenopathy:      Cervical: No cervical adenopathy.   Skin:     General: Skin is warm and dry.      Capillary Refill: Capillary refill takes less than 2 seconds.      Findings: No rash.   Neurological:      General: No focal deficit present.      Mental Status: He is alert and oriented to person, place, and time.      Sensory: No sensory deficit.      Coordination: Coordination normal.   Psychiatric:         Mood and Affect: Mood normal.         Behavior: Behavior normal.           Significant Labs: All pertinent labs within the past 24 hours have been reviewed.  CBC:   Recent Labs   Lab 05/04/23 0459 05/05/23  0321   WBC 7.74 17.20*   HGB 12.9* 11.9*   HCT 41.4 37.3*    184     CMP:   Recent Labs   Lab 05/04/23 0459 05/05/23  0321 05/05/23  1103    136 134*   K 3.6 5.4* 5.3*    107 106   CO2 22* 20* 20*    76 80   BUN 22 26* 30*   CREATININE 1.4 1.3 1.5*   CALCIUM 8.8 7.9* 7.8*   PROT 7.4 5.7*  --    ALBUMIN 3.9 2.8*  --    BILITOT 1.8* 1.5*  --    ALKPHOS 102 59  --    AST 64* 49*  --    ALT 30 31  --    ANIONGAP 12 9 8     Magnesium:   Recent Labs   Lab 05/05/23  0321   MG 1.5*       Significant Imaging: I have  reviewed all pertinent imaging results/findings within the past 24 hours.

## 2023-05-05 NOTE — PLAN OF CARE
SW met with pt at bedside to complete assessment. Pt is supported by his adult grandson Sly at bedside. Pt is AxO x3 and able to verbally answer assessment questions however asked that SW speak with grandson. Pt denied the need for  right now. SW confirmed demographic information with pt and family and will return to confirm  need. Pt is reported to be independent of ADL's and pt confirmed. Pt only uses a cane at baseline. Pt family to transport home. ADE updated whiteboard with [facility] name and contact information. ADE confirmed pt understanding of Observation unit and expected discharge plan. ADE will continue to follow pt throughout care and assist with any discharge needs.         05/05/23 0980   Discharge Planning   Assessment Type Discharge Planning Brief Assessment   Resource/Environmental Concerns none   Support Systems Family members   Equipment Currently Used at Home none   Current Living Arrangements home   Patient/Family Anticipates Transition to home with family   DME Needed Upon Discharge  none   Discharge Plan A Home with family     No future appointments.    PADMAJA Horn Case Management  [phone]\

## 2023-05-05 NOTE — PROGRESS NOTES
[facility] Medicine  Progress Note    Patient Name: Sly Villarreal  MRN: [id number]  Patient Class: OP- Observation   Admission Date: 5/4/2023  Length of Stay: 0 days  Attending Physician: Moriah Vargas MD  Primary Care Provider: Joe Bunn MD        Subjective:     Principal Problem:Idiopathic acute pancreatitis without infection or necrosis        HPI:  Sly Villarreal is a 83 y.o. male with PMH HTN, T2DM, HFpEF (EF 60%, grade I diastolic on last echo), hypothyroidism, BPH who presented for epigastric abdominal pain. Grandson at bedside assisted with history and translation per patient preference. Pt fasted day prior to admission and last ate at 8PM night before. Around 1AM on day of admission started to develop epigastric abdominal pain that progressively worsened. Also had multiple episodes of emesis. No fevers or chills. Pain does not radiate to the back. No hematemesis. Denies EtOH use. No history of pancreatitis. Prior to morning of admission had been feeling well. Pt previously on multiple medications, currently only takes gabapentin and coreg, however does not take these daily. Per grandson at bedside pt lives with him and his family. Surrogate decision maker would be his daughter, Kimberly.    In the ED VSS. CBC unremarkable. CMP with bili of 1.8 and lipase >1000. CT abd/pelvi with findings consistent with pancreatitis and mild dilation of CBD thought to be reactive inflammation. No signs of cholecystitis or choledocholithiasis. [facility] then consulted for admission for pancreatitis.       Overview/Hospital Course:  No notes on file    Interval History: NAEON. Patient reports feeling good this AM, abdominal pain better. Tolerated fluids well overnight.    Review of Systems   Constitutional:  Positive for appetite change. Negative for fatigue and fever.   HENT:  Negative for congestion, ear pain and sore throat.    Eyes:  Negative for visual disturbance.   Respiratory:   Negative for chest tightness, shortness of breath and wheezing.    Cardiovascular:  Negative for chest pain, palpitations and leg swelling.   Gastrointestinal:  Positive for abdominal pain, nausea and vomiting. Negative for constipation and diarrhea.   Endocrine: Negative for polydipsia and polyuria.   Genitourinary:  Negative for decreased urine volume and difficulty urinating.   Musculoskeletal:  Negative for back pain and gait problem.   Skin:  Negative for rash.   Allergic/Immunologic: Negative for environmental allergies.   Neurological:  Negative for dizziness, light-headedness and headaches.   Psychiatric/Behavioral:  Negative for behavioral problems and confusion.    Objective:     Vital Signs (Most Recent):  Temp: 97.4 °F (36.3 °C) (05/05/23 1210)  Pulse: 83 (05/05/23 1210)  Resp: 20 (05/05/23 1210)  BP: (!) 81/51 (05/05/23 1210)  SpO2: 96 % (05/05/23 1210) Vital Signs (24h Range):  Temp:  [96.2 °F (35.7 °C)-97.6 °F (36.4 °C)] 97.4 °F (36.3 °C)  Pulse:  [78-89] 83  Resp:  [16-20] 20  SpO2:  [92 %-96 %] 96 %  BP: ()/(49-59) 81/51     Weight: 67.4 kg (148 lb 9.4 oz)  Body mass index is 24.73 kg/m².    Intake/Output Summary (Last 24 hours) at 5/5/2023 1419  Last data filed at 5/5/2023 1210  Gross per 24 hour   Intake --   Output 400 ml   Net -400 ml         Physical Exam  Vitals and nursing note reviewed.   Constitutional:       General: He is not in acute distress.     Appearance: Normal appearance. He is not diaphoretic.   HENT:      Head: Normocephalic and atraumatic.      Right Ear: External ear normal.      Left Ear: External ear normal.      Nose: Nose normal. No congestion.      Mouth/Throat:      Mouth: Mucous membranes are moist.      Pharynx: Oropharynx is clear.   Eyes:      Extraocular Movements: Extraocular movements intact.      Conjunctiva/sclera: Conjunctivae normal.   Cardiovascular:      Rate and Rhythm: Normal rate and regular rhythm.      Pulses: Normal pulses.   Pulmonary:       Effort: Pulmonary effort is normal.      Breath sounds: Normal breath sounds. No wheezing.   Abdominal:      General: Abdomen is flat. Bowel sounds are normal.      Palpations: Abdomen is soft.      Tenderness: There is abdominal tenderness. There is no guarding or rebound.   Musculoskeletal:         General: Normal range of motion.      Cervical back: Normal range of motion.      Right lower leg: No edema.      Left lower leg: No edema.   Lymphadenopathy:      Cervical: No cervical adenopathy.   Skin:     General: Skin is warm and dry.      Capillary Refill: Capillary refill takes less than 2 seconds.      Findings: No rash.   Neurological:      General: No focal deficit present.      Mental Status: He is alert and oriented to person, place, and time.      Sensory: No sensory deficit.      Coordination: Coordination normal.   Psychiatric:         Mood and Affect: Mood normal.         Behavior: Behavior normal.           Significant Labs: All pertinent labs within the past 24 hours have been reviewed.  CBC:   Recent Labs   Lab 05/04/23 0459 05/05/23  0321   WBC 7.74 17.20*   HGB 12.9* 11.9*   HCT 41.4 37.3*    184     CMP:   Recent Labs   Lab 05/04/23 0459 05/05/23 0321 05/05/23  1103    136 134*   K 3.6 5.4* 5.3*    107 106   CO2 22* 20* 20*    76 80   BUN 22 26* 30*   CREATININE 1.4 1.3 1.5*   CALCIUM 8.8 7.9* 7.8*   PROT 7.4 5.7*  --    ALBUMIN 3.9 2.8*  --    BILITOT 1.8* 1.5*  --    ALKPHOS 102 59  --    AST 64* 49*  --    ALT 30 31  --    ANIONGAP 12 9 8     Magnesium:   Recent Labs   Lab 05/05/23  0321   MG 1.5*       Significant Imaging: I have reviewed all pertinent imaging results/findings within the past 24 hours.    Assessment/Plan:      * Idiopathic acute pancreatitis without infection or necrosis  Lipase >1000 on admission  CT scan with pancreatitis and mild CBD dilation to 13mm thought to be reactive.   No signs of cholelithiasis or choledocolithiasis.  US no signs of  geni, BD dilation  Lipid panel unremarkable  S/p fluid resus, Stable improving    Plan:  - Clear liquid diet, ADAT, IVF prn  - Nausea control with zofran and promethazine  - Pain control with tylenol and dilaudid PRN, will plan to transition to PO  - WBC increased, Serial abdominal signs to monitor for signs of worsening clinical status      BENEDICT (acute kidney injury)  Associated with hyperkalemia  - Pos pre-renal, will f/u urine studies  - Monitor renal function      Chronic heart failure with preserved ejection fraction  No longer taking lasix or bumex. Not fluid overloaded on presentation  Slightly fluid overloaded following morning s/p IVF    Plan:  - IVF discontinued s/p Lasix  - Continue home coreg  - Monitor volume status      Secondary hypertension  Continue home coreg 6.25mg BID      BPH (benign prostatic hyperplasia)  No longer taking flomax. Will monitor for UOP and re-start if needed      Hypothyroidism  Not currently taking levothyroxine. Last TSH in December was 6  Will obtain TSH and FT4      Type 2 diabetes mellitus, without long-term current use of insulin  Previously on metformin and repaglinide, no longer taking  Will obtain Hgb A1C and start MDSSI while inpatient  Continue home gabapentin 100mg TID      VTE Risk Mitigation (From admission, onward)           Ordered     IP VTE HIGH RISK PATIENT  Once         05/04/23 0753     Place sequential compression device  Until discontinued         05/04/23 0753                    Discharge Planning   JAYNE:      Code Status: Full Code   Is the patient medically ready for discharge?:     Reason for patient still in hospital (select all that apply): Patient trending condition and Laboratory test  Discharge Plan A: Home with family                  Fernie Hurtado MD  Department of Hospital Medicine   [place] [facility]

## 2023-05-05 NOTE — ASSESSMENT & PLAN NOTE
No longer taking lasix or bumex. Not fluid overloaded on presentation  Slightly fluid overloaded following morning s/p IVF    Plan:  - IVF discontinued s/p Lasix  - Continue home coreg  - Monitor volume status

## 2023-05-05 NOTE — PLAN OF CARE
End of Week Note:     Pt is clear from CM perspective once medically clear; no further CM needs noted. Pt to have PCP follow up. Pharmacist will go over home medications and reasons for medications. VN and bedside nurse to reiterate final discharge instructions.       At time of discharge pt will be transported home by family.     DME at discharge: none  Home Health: none    Pt has follow up appointments added to AVS.       05/05/23 1502   Final Note   Assessment Type Final Discharge Note   Anticipated Discharge Disposition Home   What phone number can be called within the next 1-3 days to see how you are doing after discharge? [phone]   Hospital Resources/Appts/Education Provided Appointments scheduled by Navigator/Coordinator   Post-Acute Status   Discharge Delays None known at this time     SCHED PCP w/Dr Joe Bunn on 5/15 at 1:15    PADMAJA Horn Case Management  [phone]

## 2023-05-05 NOTE — ASSESSMENT & PLAN NOTE
Lipase >1000 on admission  CT scan with pancreatitis and mild CBD dilation to 13mm thought to be reactive.   No signs of cholelithiasis or choledocolithiasis.  US no signs of geni, BD dilation  Lipid panel unremarkable  S/p fluid resus, Stable improving    Plan:  - Clear liquid diet, ADAT, IVF prn  - Nausea control with zofran and promethazine  - Pain control with tylenol and dilaudid PRN, will plan to transition to PO  - WBC increased, Serial abdominal signs to monitor for signs of worsening clinical status

## 2023-05-06 VITALS
WEIGHT: 157.88 LBS | HEIGHT: 65 IN | BODY MASS INDEX: 26.3 KG/M2 | SYSTOLIC BLOOD PRESSURE: 123 MMHG | RESPIRATION RATE: 20 BRPM | TEMPERATURE: 97 F | DIASTOLIC BLOOD PRESSURE: 58 MMHG | OXYGEN SATURATION: 97 % | HEART RATE: 76 BPM

## 2023-05-06 LAB
ALBUMIN SERPL BCP-MCNC: 2.5 G/DL (ref 3.5–5.2)
ALP SERPL-CCNC: 64 U/L (ref 55–135)
ALT SERPL W/O P-5'-P-CCNC: 27 U/L (ref 10–44)
ANION GAP SERPL CALC-SCNC: 8 MMOL/L (ref 8–16)
ANISOCYTOSIS BLD QL SMEAR: SLIGHT
AST SERPL-CCNC: 42 U/L (ref 10–40)
BASO STIPL BLD QL SMEAR: ABNORMAL
BASOPHILS NFR BLD: 0 % (ref 0–1.9)
BILIRUB SERPL-MCNC: 1.1 MG/DL (ref 0.1–1)
BUN SERPL-MCNC: 32 MG/DL (ref 8–23)
BURR CELLS BLD QL SMEAR: ABNORMAL
CALCIUM SERPL-MCNC: 7.8 MG/DL (ref 8.7–10.5)
CHLORIDE SERPL-SCNC: 104 MMOL/L (ref 95–110)
CO2 SERPL-SCNC: 21 MMOL/L (ref 23–29)
CREAT SERPL-MCNC: 1.4 MG/DL (ref 0.5–1.4)
CREAT UR-MCNC: 64.2 MG/DL (ref 23–375)
DACRYOCYTES BLD QL SMEAR: ABNORMAL
DIFFERENTIAL METHOD: ABNORMAL
EOSINOPHIL NFR BLD: 0 % (ref 0–8)
ERYTHROCYTE [DISTWIDTH] IN BLOOD BY AUTOMATED COUNT: 14.8 % (ref 11.5–14.5)
EST. GFR  (NO RACE VARIABLE): 49 ML/MIN/1.73 M^2
GIANT PLATELETS BLD QL SMEAR: PRESENT
GLUCOSE SERPL-MCNC: 65 MG/DL (ref 70–110)
HCT VFR BLD AUTO: 34.6 % (ref 40–54)
HGB BLD-MCNC: 11 G/DL (ref 14–18)
IMM GRANULOCYTES # BLD AUTO: ABNORMAL K/UL (ref 0–0.04)
IMM GRANULOCYTES NFR BLD AUTO: ABNORMAL % (ref 0–0.5)
LYMPHOCYTES NFR BLD: 8 % (ref 18–48)
MAGNESIUM SERPL-MCNC: 2 MG/DL (ref 1.6–2.6)
MCH RBC QN AUTO: 25 PG (ref 27–31)
MCHC RBC AUTO-ENTMCNC: 31.8 G/DL (ref 32–36)
MCV RBC AUTO: 79 FL (ref 82–98)
MONOCYTES NFR BLD: 5 % (ref 4–15)
NEUTROPHILS NFR BLD: 87 % (ref 38–73)
NRBC BLD-RTO: 0 /100 WBC
OVALOCYTES BLD QL SMEAR: ABNORMAL
PHOSPHATE SERPL-MCNC: 2.9 MG/DL (ref 2.7–4.5)
PLATELET # BLD AUTO: 174 K/UL (ref 150–450)
PLATELET BLD QL SMEAR: ABNORMAL
PMV BLD AUTO: 9.8 FL (ref 9.2–12.9)
POCT GLUCOSE: 65 MG/DL (ref 70–110)
POCT GLUCOSE: 72 MG/DL (ref 70–110)
POCT GLUCOSE: 99 MG/DL (ref 70–110)
POIKILOCYTOSIS BLD QL SMEAR: SLIGHT
POLYCHROMASIA BLD QL SMEAR: ABNORMAL
POTASSIUM SERPL-SCNC: 3.8 MMOL/L (ref 3.5–5.1)
PROT SERPL-MCNC: 5.5 G/DL (ref 6–8.4)
RBC # BLD AUTO: 4.4 M/UL (ref 4.6–6.2)
SODIUM SERPL-SCNC: 133 MMOL/L (ref 136–145)
SODIUM UR-SCNC: 35 MMOL/L (ref 20–250)
UUN UR-MCNC: 657 MG/DL (ref 140–1050)
WBC # BLD AUTO: 15.07 K/UL (ref 3.9–12.7)

## 2023-05-06 PROCEDURE — 84540 ASSAY OF URINE/UREA-N: CPT | Performed by: STUDENT IN AN ORGANIZED HEALTH CARE EDUCATION/TRAINING PROGRAM

## 2023-05-06 PROCEDURE — 25000003 PHARM REV CODE 250: Performed by: STUDENT IN AN ORGANIZED HEALTH CARE EDUCATION/TRAINING PROGRAM

## 2023-05-06 PROCEDURE — 36415 COLL VENOUS BLD VENIPUNCTURE: CPT | Performed by: STUDENT IN AN ORGANIZED HEALTH CARE EDUCATION/TRAINING PROGRAM

## 2023-05-06 PROCEDURE — 82570 ASSAY OF URINE CREATININE: CPT | Performed by: STUDENT IN AN ORGANIZED HEALTH CARE EDUCATION/TRAINING PROGRAM

## 2023-05-06 PROCEDURE — 83735 ASSAY OF MAGNESIUM: CPT | Performed by: STUDENT IN AN ORGANIZED HEALTH CARE EDUCATION/TRAINING PROGRAM

## 2023-05-06 PROCEDURE — 84100 ASSAY OF PHOSPHORUS: CPT | Performed by: STUDENT IN AN ORGANIZED HEALTH CARE EDUCATION/TRAINING PROGRAM

## 2023-05-06 PROCEDURE — 85027 COMPLETE CBC AUTOMATED: CPT | Performed by: STUDENT IN AN ORGANIZED HEALTH CARE EDUCATION/TRAINING PROGRAM

## 2023-05-06 PROCEDURE — 84300 ASSAY OF URINE SODIUM: CPT | Performed by: STUDENT IN AN ORGANIZED HEALTH CARE EDUCATION/TRAINING PROGRAM

## 2023-05-06 PROCEDURE — 85007 BL SMEAR W/DIFF WBC COUNT: CPT | Mod: NCS | Performed by: STUDENT IN AN ORGANIZED HEALTH CARE EDUCATION/TRAINING PROGRAM

## 2023-05-06 PROCEDURE — 80053 COMPREHEN METABOLIC PANEL: CPT | Performed by: STUDENT IN AN ORGANIZED HEALTH CARE EDUCATION/TRAINING PROGRAM

## 2023-05-06 PROCEDURE — G0378 HOSPITAL OBSERVATION PER HR: HCPCS

## 2023-05-06 RX ORDER — POTASSIUM CHLORIDE 20 MEQ/1
40 TABLET, EXTENDED RELEASE ORAL EVERY 4 HOURS
Status: DISPENSED | OUTPATIENT
Start: 2023-05-06 | End: 2023-05-06

## 2023-05-06 RX ADMIN — DEXTROSE 15000 MG: 15 GEL ORAL at 04:05

## 2023-05-06 RX ADMIN — CARVEDILOL 6.25 MG: 6.25 TABLET, FILM COATED ORAL at 08:05

## 2023-05-06 RX ADMIN — POTASSIUM CHLORIDE 40 MEQ: 1500 TABLET, EXTENDED RELEASE ORAL at 06:05

## 2023-05-06 RX ADMIN — GABAPENTIN 100 MG: 100 CAPSULE ORAL at 08:05

## 2023-05-06 RX ADMIN — LEVOTHYROXINE SODIUM 25 MCG: 25 TABLET ORAL at 06:05

## 2023-05-06 NOTE — ASSESSMENT & PLAN NOTE
Lipase >1000 on admission. CT scan with pancreatitis and mild CBD dilation to 13mm thought to be reactive. No signs of cholelithiasis or choledocolithiasis. US no signs of geni, BD dilation. Lipid panel unremarkable. S/p fluid resus.    Plan:  - Currently on cardiac diet  - Nausea control with zofran and promethazine  - Pain control with tylenol and dilaudid PRN, will plan to transition to PO

## 2023-05-06 NOTE — PROGRESS NOTES
The sw spoke to the pt's son Sly who was in his room via phone. He states his other brother Sly will transport them home at d/c. The sw stressed the importance of going to the hospital follow up's and taking his medications. He acknowledged understanding and states the pt will comply. The pt has no further Case Management needs and is clear to d/c.     [place] - Telemetry  Discharge Final Note    Primary Care Provider: Joe Bunn MD    Expected Discharge Date: 5/6/2023    Final Discharge Note (most recent)       Final Note - 05/05/23 1502          Final Note    Assessment Type Final Discharge Note     Anticipated Discharge Disposition Home or Self Care     What phone number can be called within the next 1-3 days to see how you are doing after discharge? [phone]     Hospital Resources/Appts/Education Provided Appointments scheduled by Navigator/Coordinator        Post-Acute Status    Discharge Delays None known at this time                     Important Message from Medicare             Contact Info       Joe Bunn MD   Specialty: Internal Medicine   Relationship: PCP - General    [address]  [address]  [place] [address]   Phone: [phone]       Next Steps: Schedule an appointment as soon as possible for a visit in 2 day(s)    [place] - Emergency Dept   Specialty: Emergency Medicine    [address]  [place] [address]   Phone: [phone]       Next Steps: Go to    Instructions: If symptoms worsen

## 2023-05-06 NOTE — SUBJECTIVE & OBJECTIVE
Interval History: NAEON. Patient continues to report resolution of abdominal pain, though still decreased appetite.    Review of Systems   Constitutional:  Positive for appetite change. Negative for fatigue and fever.   HENT:  Negative for congestion, ear pain and sore throat.    Eyes:  Negative for visual disturbance.   Respiratory:  Negative for chest tightness, shortness of breath and wheezing.    Cardiovascular:  Negative for chest pain, palpitations and leg swelling.   Gastrointestinal:  Negative for abdominal pain, constipation, diarrhea, nausea and vomiting.   Endocrine: Negative for polydipsia and polyuria.   Genitourinary:  Negative for decreased urine volume and difficulty urinating.   Musculoskeletal:  Negative for back pain and gait problem.   Skin:  Negative for rash.   Allergic/Immunologic: Negative for environmental allergies.   Neurological:  Negative for dizziness, light-headedness and headaches.   Psychiatric/Behavioral:  Negative for behavioral problems and confusion.    Objective:     Vital Signs (Most Recent):  Temp: 97.6 °F (36.4 °C) (05/06/23 0742)  Pulse: 84 (05/06/23 0742)  Resp: 18 (05/06/23 0742)  BP: (!) 120/56 (05/06/23 0742)  SpO2: (!) 92 % (05/06/23 0742) Vital Signs (24h Range):  Temp:  [96.4 °F (35.8 °C)-99 °F (37.2 °C)] 97.6 °F (36.4 °C)  Pulse:  [80-91] 84  Resp:  [17-20] 18  SpO2:  [92 %-96 %] 92 %  BP: ()/(49-66) 120/56     Weight: 71.6 kg (157 lb 13.6 oz)  Body mass index is 26.27 kg/m².    Intake/Output Summary (Last 24 hours) at 5/6/2023 0813  Last data filed at 5/5/2023 1700  Gross per 24 hour   Intake --   Output 200 ml   Net -200 ml           Physical Exam  Vitals and nursing note reviewed.   Constitutional:       General: He is not in acute distress.     Appearance: Normal appearance. He is not diaphoretic.   HENT:      Head: Normocephalic and atraumatic.      Right Ear: External ear normal.      Left Ear: External ear normal.      Nose: Nose normal. No congestion.       Mouth/Throat:      Mouth: Mucous membranes are moist.      Pharynx: Oropharynx is clear.   Eyes:      Extraocular Movements: Extraocular movements intact.      Conjunctiva/sclera: Conjunctivae normal.   Cardiovascular:      Rate and Rhythm: Normal rate and regular rhythm.      Pulses: Normal pulses.   Pulmonary:      Effort: Pulmonary effort is normal.      Breath sounds: Normal breath sounds. No wheezing.   Abdominal:      General: Abdomen is flat. Bowel sounds are normal.      Palpations: Abdomen is soft.      Tenderness: There is abdominal tenderness. There is no guarding or rebound.   Musculoskeletal:         General: Normal range of motion.      Cervical back: Normal range of motion.      Right lower leg: No edema.      Left lower leg: No edema.   Lymphadenopathy:      Cervical: No cervical adenopathy.   Skin:     General: Skin is warm and dry.      Capillary Refill: Capillary refill takes less than 2 seconds.      Findings: No rash.   Neurological:      General: No focal deficit present.      Mental Status: He is alert and oriented to person, place, and time.      Sensory: No sensory deficit.      Coordination: Coordination normal.   Psychiatric:         Mood and Affect: Mood normal.         Behavior: Behavior normal.           Significant Labs: All pertinent labs within the past 24 hours have been reviewed.  CBC:   Recent Labs   Lab 05/05/23 0321 05/06/23  0316   WBC 17.20* 15.07*   HGB 11.9* 11.0*   HCT 37.3* 34.6*    174       CMP:   Recent Labs   Lab 05/05/23 0321 05/05/23  1103 05/06/23  0316    134* 133*   K 5.4* 5.3* 3.8    106 104   CO2 20* 20* 21*   GLU 76 80 65*   BUN 26* 30* 32*   CREATININE 1.3 1.5* 1.4   CALCIUM 7.9* 7.8* 7.8*   PROT 5.7*  --  5.5*   ALBUMIN 2.8*  --  2.5*   BILITOT 1.5*  --  1.1*   ALKPHOS 59  --  64   AST 49*  --  42*   ALT 31  --  27   ANIONGAP 9 8 8       Magnesium:   Recent Labs   Lab 05/05/23 0321 05/06/23  0316   MG 1.5* 2.0         Significant  Imaging: I have reviewed all pertinent imaging results/findings within the past 24 hours.

## 2023-05-06 NOTE — PROGRESS NOTES
[facility] Medicine  Progress Note    Patient Name: Sly Villarreal  MRN: [id number]  Patient Class: OP- Observation   Admission Date: 5/4/2023  Length of Stay: 0 days  Attending Physician: Moriah Vargas MD  Primary Care Provider: Joe Bunn MD        Subjective:     Principal Problem:Idiopathic acute pancreatitis without infection or necrosis        HPI:  Sly Villarreal is a 83 y.o. male with PMH HTN, T2DM, HFpEF (EF 60%, grade I diastolic on last echo), hypothyroidism, BPH who presented for epigastric abdominal pain. Grandson at bedside assisted with history and translation per patient preference. Pt fasted day prior to admission and last ate at 8PM night before. Around 1AM on day of admission started to develop epigastric abdominal pain that progressively worsened. Also had multiple episodes of emesis. No fevers or chills. Pain does not radiate to the back. No hematemesis. Denies EtOH use. No history of pancreatitis. Prior to morning of admission had been feeling well. Pt previously on multiple medications, currently only takes gabapentin and coreg, however does not take these daily. Per grandson at bedside pt lives with him and his family. Surrogate decision maker would be his daughter, Kimberly.    In the ED VSS. CBC unremarkable. CMP with bili of 1.8 and lipase >1000. CT abd/pelvi with findings consistent with pancreatitis and mild dilation of CBD thought to be reactive inflammation. No signs of cholecystitis or choledocholithiasis. [facility] then consulted for admission for pancreatitis.       Overview/Hospital Course:  No notes on file    Interval History: NAEON. Patient continues to report resolution of abdominal pain, though still decreased appetite.    Review of Systems   Constitutional:  Positive for appetite change. Negative for fatigue and fever.   HENT:  Negative for congestion, ear pain and sore throat.    Eyes:  Negative for visual disturbance.   Respiratory:  Negative  for chest tightness, shortness of breath and wheezing.    Cardiovascular:  Negative for chest pain, palpitations and leg swelling.   Gastrointestinal:  Negative for abdominal pain, constipation, diarrhea, nausea and vomiting.   Endocrine: Negative for polydipsia and polyuria.   Genitourinary:  Negative for decreased urine volume and difficulty urinating.   Musculoskeletal:  Negative for back pain and gait problem.   Skin:  Negative for rash.   Allergic/Immunologic: Negative for environmental allergies.   Neurological:  Negative for dizziness, light-headedness and headaches.   Psychiatric/Behavioral:  Negative for behavioral problems and confusion.    Objective:     Vital Signs (Most Recent):  Temp: 97.6 °F (36.4 °C) (05/06/23 0742)  Pulse: 84 (05/06/23 0742)  Resp: 18 (05/06/23 0742)  BP: (!) 120/56 (05/06/23 0742)  SpO2: (!) 92 % (05/06/23 0742) Vital Signs (24h Range):  Temp:  [96.4 °F (35.8 °C)-99 °F (37.2 °C)] 97.6 °F (36.4 °C)  Pulse:  [80-91] 84  Resp:  [17-20] 18  SpO2:  [92 %-96 %] 92 %  BP: ()/(49-66) 120/56     Weight: 71.6 kg (157 lb 13.6 oz)  Body mass index is 26.27 kg/m².    Intake/Output Summary (Last 24 hours) at 5/6/2023 0813  Last data filed at 5/5/2023 1700  Gross per 24 hour   Intake --   Output 200 ml   Net -200 ml           Physical Exam  Vitals and nursing note reviewed.   Constitutional:       General: He is not in acute distress.     Appearance: Normal appearance. He is not diaphoretic.   HENT:      Head: Normocephalic and atraumatic.      Right Ear: External ear normal.      Left Ear: External ear normal.      Nose: Nose normal. No congestion.      Mouth/Throat:      Mouth: Mucous membranes are moist.      Pharynx: Oropharynx is clear.   Eyes:      Extraocular Movements: Extraocular movements intact.      Conjunctiva/sclera: Conjunctivae normal.   Cardiovascular:      Rate and Rhythm: Normal rate and regular rhythm.      Pulses: Normal pulses.   Pulmonary:      Effort: Pulmonary  effort is normal.      Breath sounds: Normal breath sounds. No wheezing.   Abdominal:      General: Abdomen is flat. Bowel sounds are normal.      Palpations: Abdomen is soft.      Tenderness: There is abdominal tenderness. There is no guarding or rebound.   Musculoskeletal:         General: Normal range of motion.      Cervical back: Normal range of motion.      Right lower leg: No edema.      Left lower leg: No edema.   Lymphadenopathy:      Cervical: No cervical adenopathy.   Skin:     General: Skin is warm and dry.      Capillary Refill: Capillary refill takes less than 2 seconds.      Findings: No rash.   Neurological:      General: No focal deficit present.      Mental Status: He is alert and oriented to person, place, and time.      Sensory: No sensory deficit.      Coordination: Coordination normal.   Psychiatric:         Mood and Affect: Mood normal.         Behavior: Behavior normal.           Significant Labs: All pertinent labs within the past 24 hours have been reviewed.  CBC:   Recent Labs   Lab 05/05/23 0321 05/06/23  0316   WBC 17.20* 15.07*   HGB 11.9* 11.0*   HCT 37.3* 34.6*    174       CMP:   Recent Labs   Lab 05/05/23 0321 05/05/23  1103 05/06/23  0316    134* 133*   K 5.4* 5.3* 3.8    106 104   CO2 20* 20* 21*   GLU 76 80 65*   BUN 26* 30* 32*   CREATININE 1.3 1.5* 1.4   CALCIUM 7.9* 7.8* 7.8*   PROT 5.7*  --  5.5*   ALBUMIN 2.8*  --  2.5*   BILITOT 1.5*  --  1.1*   ALKPHOS 59  --  64   AST 49*  --  42*   ALT 31  --  27   ANIONGAP 9 8 8       Magnesium:   Recent Labs   Lab 05/05/23 0321 05/06/23  0316   MG 1.5* 2.0         Significant Imaging: I have reviewed all pertinent imaging results/findings within the past 24 hours.      Assessment/Plan:      * Idiopathic acute pancreatitis without infection or necrosis  Lipase >1000 on admission. CT scan with pancreatitis and mild CBD dilation to 13mm thought to be reactive. No signs of cholelithiasis or choledocolithiasis. US no  signs of geni, BD dilation. Lipid panel unremarkable. S/p fluid resus.    Plan:  - Currently on cardiac diet  - Nausea control with zofran and promethazine  - Pain control with tylenol and dilaudid PRN, will plan to transition to PO        BENEDICT (acute kidney injury)  Associated with hyperkalemia  - Pos pre-renal, will f/u urine studies  - Monitor renal function      Secondary hypertension  Continue home coreg 6.25mg BID      Chronic heart failure with preserved ejection fraction  No longer taking lasix or bumex. Not fluid overloaded on presentation  Slightly fluid overloaded following morning s/p IVF    Plan:  - IVF discontinued s/p Lasix  - Continue home coreg  - Monitor volume status      BPH (benign prostatic hyperplasia)  No longer taking flomax. Will monitor for UOP and re-start if needed      Type 2 diabetes mellitus, without long-term current use of insulin  Previously on metformin and repaglinide, no longer taking  Will obtain Hgb A1C and start MDSSI while inpatient  Continue home gabapentin 100mg TID      VTE Risk Mitigation (From admission, onward)           Ordered     IP VTE HIGH RISK PATIENT  Once         05/04/23 0753     Place sequential compression device  Until discontinued         05/04/23 0753                    Discharge Planning   JAYNE:      Code Status: Full Code   Is the patient medically ready for discharge?:     Reason for patient still in hospital (select all that apply): Patient trending condition  Discharge Plan A: Home with family   Discharge Delays: None known at this time              Rai Rivera MD  Department of Hospital Medicine   [place] - Telemetry

## 2023-05-06 NOTE — PLAN OF CARE
Problem: Adult Inpatient Plan of Care  Goal: Plan of Care Review  5/6/2023 1502 by Bianca Blanco RN  Outcome: Met  5/6/2023 7948 by Bianca Blanco RN  Outcome: Ongoing, Progressing  Goal: Patient-Specific Goal (Individualized)  Outcome: Met  Goal: Absence of Hospital-Acquired Illness or Injury  Outcome: Met  Goal: Optimal Comfort and Wellbeing  Outcome: Met  Goal: Readiness for Transition of Care  Outcome: Met     Problem: Diabetes Comorbidity  Goal: Blood Glucose Level Within Targeted Range  Outcome: Met     Problem: Fall Injury Risk  Goal: Absence of Fall and Fall-Related Injury  Outcome: Met     Problem: Pain Acute  Goal: Acceptable Pain Control and Functional Ability  Outcome: Met     Problem: Fluid Imbalance (Pancreatitis)  Goal: Fluid Balance  Outcome: Met     Problem: Infection (Pancreatitis)  Goal: Infection Symptom Resolution  Outcome: Met     Problem: Nutrition Impaired (Pancreatitis)  Goal: Optimal Nutrition Intake  Outcome: Met     Problem: Respiratory Compromise (Pancreatitis)  Goal: Effective Oxygenation and Ventilation  Outcome: Met     Problem: Fluid and Electrolyte Imbalance (Acute Kidney Injury/Impairment)  Goal: Fluid and Electrolyte Balance  Outcome: Met     Problem: Oral Intake Inadequate (Acute Kidney Injury/Impairment)  Goal: Optimal Nutrition Intake  Outcome: Met     Problem: Renal Function Impairment (Acute Kidney Injury/Impairment)  Goal: Effective Renal Function  Outcome: Met     Problem: Heart Failure Comorbidity  Goal: Maintenance of Heart Failure Symptom Control  Outcome: Met     Problem: Hypertension Comorbidity  Goal: Blood Pressure in Desired Range  Outcome: Met

## 2023-05-06 NOTE — PROGRESS NOTES
Discharge orders noted. Additional clinical references attached. Patient's discharge instructions given by bedside RN and reviewed via this VN.  Education provided on new and previous medications, diagnosis, and follow-up appointments.  Patient's son verbalized understanding and teach back method was used. Patient's ride/transportation home at bedside. All questions answered. Transport to [facility] requested. Floor nurse notified.              05/06/23 6413   Admission   Communication Issues? None   Shift   Virtual Nurse - Rounding Complete  (Discharge)   Virtual Nurse - Patient Verbalized Approval Of Camera Use   Safety/Activity   Patient Rounds bed in low position;call light in patient/parent reach;clutter free environment maintained;visualized patient   Safety Promotion/Fall Prevention side rails raised x 2   Activity Management Up in chair - L3

## 2023-05-06 NOTE — PROGRESS NOTES
[facility]                    Pharmacy       Discharge Medication Education    Patient and son ACCEPTED medication education. Pharmacy has provided education on the name, indication, and possible side effects of the medication(s) prescribed, using teach-back method.     The following medications have also been discussed, during this admission.        Medication List        CONTINUE taking these medications      carvediloL 6.25 MG tablet  Commonly known as: COREG  Take 1 tablet by mouth every 12 hours.     gabapentin 100 MG capsule  Commonly known as: NEURONTIN  Take 1 capsule by mouth every 8 hours.               Thank you  Susan Leung, PharmD  [phone]

## 2023-05-08 NOTE — DISCHARGE SUMMARY
[facility] Medicine  Discharge Summary      Patient Name: Sly Villarreal  MRN: [id number]  DAVID: [id number]  Patient Class: OP- Observation  Admission Date: 5/4/2023  Hospital Length of Stay: 0 days  Discharge Date and Time: 5/6/2023  3:35 PM  Attending Physician: Moriah Vargas MD   Discharging Provider: Radha Asecnio MD  Primary Care Provider: Joe Bunn MD    Primary Care Team: Networked reference to record PCT     HPI:   Sly Villarreal is a 83 y.o. male with PMH HTN, T2DM, HFpEF (EF 60%, grade I diastolic on last echo), hypothyroidism, BPH who presented for epigastric abdominal pain. Grandson at bedside assisted with history and translation per patient preference. Pt fasted day prior to admission and last ate at 8PM night before. Around 1AM on day of admission started to develop epigastric abdominal pain that progressively worsened. Also had multiple episodes of emesis. No fevers or chills. Pain does not radiate to the back. No hematemesis. Denies EtOH use. No history of pancreatitis. Prior to morning of admission had been feeling well. Pt previously on multiple medications, currently only takes gabapentin and coreg, however does not take these daily. Per grandson at bedside pt lives with him and his family. Surrogate decision maker would be his daughter, Kimberly.    In the ED VSS. CBC unremarkable. CMP with bili of 1.8 and lipase >1000. CT abd/pelvi with findings consistent with pancreatitis and mild dilation of CBD thought to be reactive inflammation. No signs of cholecystitis or choledocholithiasis. [facility] then consulted for admission for pancreatitis.       * No surgery found *      Hospital Course:   Pt admitted with idiopathic acute pancreatitis without infection or necrosis. Lipase >1000 on admission. CT scan with pancreatitis and mild CBD dilation to 13 mm thought to be reactive. No signs of cholelithiasis or choledocholithiasis. IVF given:  cc/hr. Pt on clear liquid  diet, advanced as tolerated. N/V controlled with Zofran and promethazine. Tylenol and dilaudid PRN for analgesia. Abdo US w/o signs of cholelithiasis or acute cholecystitis. Intrahepatic and extrahepatic bile duct dilatation. Pt's abdominal pain continued to improve and he was upgraded to cardiac diet.     Pt developed BENEDICT associated with hyperkalemia, pre-renal FENa score of 0.6% based on urine studies. Cr normalized to 1.4 on d/c. For HTN, Coreg was continued. IVF was d/c after Lasix given as pt was slightly fluid overloaded following IVF. Volume status monitored. For T2DM, pt put on SSI. HbA1c 5.9% here. Home gabapentin continued. For hypothyroidism, pt not taking levothyroxine. TSH 18.294 but free T4 wnl at 1.03 here. Will f/u with PCP. On day of d/c he no longer required IV pain meds. Pt continued to improve clinically and was able to tolerate PO. Pt stable at time of discharge, verbalized understanding of plan and was appropriate for discharge. All questions answered and ED return precautions provided. Pt was instructed to follow up with PCP Dr. Bunn within 1 week.              Goals of Care Treatment Preferences:  Code Status: Full Code      Consults:     No new Assessment & Plan notes have been filed under this hospital service since the last note was generated.  Service: Hospital Medicine    Final Active Diagnoses:    Diagnosis Date Noted POA    PRINCIPAL PROBLEM:  Idiopathic acute pancreatitis without infection or necrosis [K85.00] 05/04/2023 Yes    BENEDICT (acute kidney injury) [N17.9] 05/05/2023 No    Chronic heart failure with preserved ejection fraction [I50.32] 05/04/2023 Yes    Secondary hypertension [I15.9] 05/04/2023 Yes    BPH (benign prostatic hyperplasia) [N40.0] 07/17/2019 Yes    Type 2 diabetes mellitus, without long-term current use of insulin [E11.9] 07/16/2019 Yes    Hypothyroidism [E03.9] 07/16/2019 Yes      Problems Resolved During this Admission:       Discharged Condition:  good    Disposition: Home or Self Care    Follow Up:   Follow-up Information     Joe Bunn MD. Schedule an appointment as soon as possible for a visit in 2 day(s).    Specialty: Internal Medicine  Contact information:  [address]  [address]  [place] [address]  [phone]             [facility] Emergency Dept. Go to.    Specialty: Emergency Medicine  Why: If symptoms worsen  Contact information:  [address]  [facility] [address] [phone]                     Patient Instructions:      Notify your health care provider if you experience any of the following:  temperature >100.4     Notify your health care provider if you experience any of the following:  persistent nausea and vomiting or diarrhea     Notify your health care provider if you experience any of the following:  severe uncontrolled pain     Activity as tolerated       Significant Diagnostic Studies:     Labs:   CMP No results for input(s): NA, K, CL, CO2, GLU, BUN, CREATININE, CALCIUM, PROT, ALBUMIN, BILITOT, ALKPHOS, AST, ALT, ANIONGAP, ESTGFRAFRICA, EGFRNONAA in the last 48 hours., CBC No results for input(s): WBC, HGB, HCT, PLT in the last 48 hours.     Lipid Panel   Lab Results   Component Value Date    CHOL 200 (H) 05/04/2023    HDL 41 05/04/2023    LDLCALC 133.6 05/04/2023    TRIG 127 05/04/2023    CHOLHDL 20.5 05/04/2023     A1C:   Recent Labs   Lab 12/13/22  1250 05/04/23  0459   HGBA1C 5.8* 5.9*    and All labs within the past 24 hours have been reviewed    Radiology:     Imaging Results          US Abdomen Limited (Gallbladder) (Final result)  Result time 05/04/23 08:30:47    Final result by Sandip Woo MD (05/04/23 08:30:47)                 Impression:      1. No evidence of cholelithiasis or acute cholecystitis.  2. Intrahepatic and extrahepatic bile duct dilatation.  No definite sonographic evidence of choledocholithiasis at present time.  Correlation with LFTs would be recommended.  3. Additional details,  as provided in the body of report.      Electronically signed by: Sandip Woo  Date:    05/04/2023  Time:    08:30             Narrative:    EXAMINATION:  US ABDOMEN LIMITED    CLINICAL HISTORY:  Right upper quadrant pain    TECHNIQUE:  Limited ultrasound of the right upper quadrant of the abdomen (including pancreas, liver, gallbladder, common bile duct, and spleen) was performed.    COMPARISON:  CT of the abdomen pelvis performed 05/04/2023.    FINDINGS:  Gallbladder: No calculi, wall thickening, or pericholecystic fluid.  No sonographic Rich's sign.    Biliary system: The common duct is mildly prominent for age, measuring 9-10 mm.  Mild intrahepatic biliary duct dilatation.    Miscellaneous: Inflammatory changes about the pancreas, as seen on earlier same day CT of the abdomen and pelvis.  No evidence of right-sided hydronephrosis.                               CT Abdomen Pelvis With Contrast (Final result)  Result time 05/04/23 06:39:16    Final result by Sandip Woo MD (05/04/23 06:39:16)                 Impression:      1. Constellation of findings in keeping with acute pancreatitis, as described.  Peripancreatic inflammation and unencapsulated free fluid without discrete drainable fluid collection or abscess appreciated.  2. Wall thickening and mucosal hyperemia of the distal stomach, entirety of the duodenum, and likely proximal duodenum may be reactive to the above.  3. Additionally noted is intrahepatic and extrahepatic biliary dilatation, again felt likely reactive to the above.  Correlation with LFTs would be recommended.  No definite radiodense cholelithiasis or choledocholithiasis appreciated at the present time.  4. Nonspecific pulmonary nodules measuring up to 7 mm.  For multiple solid nodules with any 6 mm or greater, Fleischner Society guidelines recommend follow up with non-contrast chest CT at 3-6 months and 18-24 months after discovery.  5. Nonspecific geographic focus of relative  hyperattenuation/hyperenhancement in the liver measuring up to 31 mm.  Correlation with any outside imaging to establish stability be recommended.  If none is available, further characterization with dedicated hepatic mass protocol MRI or CT would be recommended.  6. Additional details, as provided in the body of report.      Electronically signed by: Sandip Woo  Date:    05/04/2023  Time:    06:39             Narrative:    EXAMINATION:  CT ABDOMEN PELVIS WITH CONTRAST    CLINICAL HISTORY:  Nausea/vomiting;Epigastric pain;    TECHNIQUE:  Low dose axial images, sagittal and coronal reformations were obtained from the lung bases to the pubic symphysis following the IV administration of 100 mL of Omnipaque 350    COMPARISON:  None.    FINDINGS:  Lower chest: Assessment of the lower chest limited due to motion.  7 mm solid nodule right middle lobe marginating the fissure (axial series 2, image 6).  6 mm solid nodule left lower lobe (series 2, image 22).  3-4 mm solid nodule lingula (series 2, image 12).  Dependent atelectasis.    Liver: Normal contour.  Geographic focus of relative hyperattenuation/hyperenhancement anterior right hepatic lobe/medial left hepatic lobe measuring up to 31 mm AP dimension (axial series 2, image 46)..    Gallbladder and bile ducts: Hydropic appearance of the gallbladder without definite focal pericholecystic inflammation or radiodense gallstones.  Mild intrahepatic biliary duct dilatation may shin.  Common duct dilated to least 13 mm as measured in its mid aspect.    Pancreas: Homogeneous appearance of the pancreatic parenchyma.  Peripancreatic inflammation and unencapsulated free fluid.    Spleen: Unremarkable.    Adrenals: Unremarkable.    Kidneys: Subcentimeter hypodense lesions, too small to definitely characterize.  Minor nonspecific bilateral perinephric stranding.    Lymph nodes: Prominent in number mesenteric and retroperitoneal lymph nodes, nonspecific but potentially reactive  in etiology.    Bowel and mesentery: No definite evidence of acute bowel obstruction.  Wall thickening and mucosal hyperattenuation/hyperenhancement are noted involving distal stomach in essentially the entirety of the duodenum and likely proximal aspects of the jejunum with adjoining inflammatory changes.    Small hiatal hernia.  Normal appendix.    Abdominal aorta: Nonaneurysmal.  Mild-to-moderate atherosclerotic calcification.    Inferior vena cava: Unremarkable.    Free fluid or free air: As above.  No definite free air.    Pelvis: Enlarged prostate gland.  Circumferential urinary bladder wall thickening could relate to bladder outlet obstruction.    Body wall: Unremarkable.    Bones: No acute abnormality.  Chronic bilateral L5 pars defects with anterolisthesis of L5 on S1 at least moderate to severe narrowing of bilateral neural foramina and likely impingement of the exiting L5 nerve roots.                                Pending Diagnostic Studies:     None           Medications:  Reconciled Home Medications:      Medication List      CONTINUE taking these medications    carvediloL 6.25 MG tablet  Commonly known as: COREG  Take 1 tablet by mouth every 12 hours.     gabapentin 100 MG capsule  Commonly known as: NEURONTIN  Take 1 capsule by mouth every 8 hours.            Indwelling Lines/Drains at time of discharge:   Lines/Drains/Airways     Airway  Duration                Airway - Non-Surgical Mask -- days         Airway - Non-Surgical 07/22/19 1020 Mask 1386 days                Time spent on the discharge of patient: 30 minutes         Radha Asencio MD  Department of Hospital Medicine  [facility]

## 2023-05-08 NOTE — HOSPITAL COURSE
Pt admitted with idiopathic acute pancreatitis without infection or necrosis. Lipase >1000 on admission. CT scan with pancreatitis and mild CBD dilation to 13 mm thought to be reactive. No signs of cholelithiasis or choledocholithiasis. IVF given:  cc/hr. Pt on clear liquid diet, advanced as tolerated. N/V controlled with Zofran and promethazine. Tylenol and dilaudid PRN for analgesia. Abdo US w/o signs of cholelithiasis or acute cholecystitis. Intrahepatic and extrahepatic bile duct dilatation. Pt's abdominal pain continued to improve and he was upgraded to cardiac diet.     Pt developed BENEDICT associated with hyperkalemia, pre-renal FENa score of 0.6% based on urine studies. Cr normalized to 1.4 on d/c. For HTN, Coreg was continued. IVF was d/c after Lasix given as pt was slightly fluid overloaded following IVF. Volume status monitored. For T2DM, pt put on SSI. HbA1c 5.9% here. Home gabapentin continued. For hypothyroidism, pt not taking levothyroxine. TSH 18.294 but free T4 wnl at 1.03 here. Will f/u with PCP. On day of d/c he no longer required IV pain meds. Pt continued to improve clinically and was able to tolerate PO. Pt stable at time of discharge, verbalized understanding of plan and was appropriate for discharge. All questions answered and ED return precautions provided. Pt was instructed to follow up with PCP Dr. Bunn within 1 week.

## 2023-08-07 PROBLEM — N17.9 AKI (ACUTE KIDNEY INJURY): Status: RESOLVED | Noted: 2023-05-05 | Resolved: 2023-08-07

## 2023-11-07 NOTE — ASSESSMENT & PLAN NOTE
No longer taking flomax. Will monitor for UOP and re-start if needed     pt co cold symptoms , sore throat and cough x 3 days

## (undated) DEVICE — BLADE SAW RECIP 76MMX12.5MM

## (undated) DEVICE — NDL 22GA X1 1/2 REG BEVEL

## (undated) DEVICE — SEE MEDLINE ITEM 157125

## (undated) DEVICE — GLOVE 7.5 PROTEXIS PI ORTHO PF

## (undated) DEVICE — COVER OVERHEAD SURG LT BLUE

## (undated) DEVICE — BLADE 90X13X1.27MM

## (undated) DEVICE — CARD UNIV KNEE NAVGTN SW-SCL L

## (undated) DEVICE — CLOSURE SKIN STERI STRIP 1/2X4

## (undated) DEVICE — GLOVE 8 PROTEXIS PI BLUE

## (undated) DEVICE — SPONGE LAP 18X18 PREWASHED

## (undated) DEVICE — CONTAINER SPECIMEN STR 3 0Z

## (undated) DEVICE — GAUZE SPONGE 4X4 12PLY

## (undated) DEVICE — SCRUB 10% POVIDONE IODINE 4OZ

## (undated) DEVICE — NOZZLE BNE INJ CEM FEM POST 65

## (undated) DEVICE — SUT STRATAFIX PDS 1 CTX 18IN

## (undated) DEVICE — MANIFOLD 4 PORT

## (undated) DEVICE — ALCOHOL 70% ISOP W/GREEN 16OZ

## (undated) DEVICE — PAD PREP 50/CA

## (undated) DEVICE — DRAPE TIBURON ORTHOPEDIC SPLIT

## (undated) DEVICE — PIN PINABALL HEADLESS
Type: IMPLANTABLE DEVICE | Site: KNEE | Status: NON-FUNCTIONAL
Removed: 2019-07-22

## (undated) DEVICE — SOL IRR NACL .9% 3000ML

## (undated) DEVICE — DURAPREP SURG SCRUB 26ML

## (undated) DEVICE — PULSAVAC ZIMMER

## (undated) DEVICE — ELECTRODE REM PLYHSV RETURN 9

## (undated) DEVICE — SEE MEDLINE ITEM 153151

## (undated) DEVICE — KIT MX BNE CEM REVOLTN W/BRKWY

## (undated) DEVICE — BLADE RMR PATELLA 35MM

## (undated) DEVICE — Device

## (undated) DEVICE — SUT VICRYL 1 OB 36 CTX

## (undated) DEVICE — HOOD T-5 TEAR AWAY STERILE

## (undated) DEVICE — SUT CTD VICRYL 2.0

## (undated) DEVICE — DRAPE INCISE IOBAN 2 23X23IN

## (undated) DEVICE — ADHESIVE DERMABOND ADVANCED

## (undated) DEVICE — SYR 10CC LUER LOCK

## (undated) DEVICE — NDL 18GA X1 1/2 REG BEVEL

## (undated) DEVICE — BLADE REAMER PILOT HOLE 35MM

## (undated) DEVICE — STRIP STERI REIN CLSR 1/2X2IN

## (undated) DEVICE — DRESSING AQUACEL AG ADV 3.5X12

## (undated) DEVICE — SYR ONLY LUER LOCK 20CC

## (undated) DEVICE — TOURNIQUET SB QC DP 34X4IN

## (undated) DEVICE — CLOSURE SKIN STERI STRIP 1/4X4

## (undated) DEVICE — ADAPTER DUAL IRRIGATION

## (undated) DEVICE — BATTERY INSTRUMENT

## (undated) DEVICE — DRAPE CASSETTE 20 X 40